# Patient Record
Sex: FEMALE | Race: WHITE | NOT HISPANIC OR LATINO | Employment: UNEMPLOYED | URBAN - METROPOLITAN AREA
[De-identification: names, ages, dates, MRNs, and addresses within clinical notes are randomized per-mention and may not be internally consistent; named-entity substitution may affect disease eponyms.]

---

## 2017-01-17 ENCOUNTER — GENERIC CONVERSION - ENCOUNTER (OUTPATIENT)
Dept: OTHER | Facility: OTHER | Age: 62
End: 2017-01-17

## 2018-01-16 NOTE — RESULT NOTES
Message   venous duplex wnl  Verified Results  VAS LOWER LIMB VENOUS DUPLEX STUDY, UNILATERAL/LIMITED [id number] 10:15AM Evelin Muhammad    Order Number: [id number]     Test Name Result Flag Reference   VAS LOWER LIMB VENOUS DUPLEX STUDY, UNILATERAL/LIMITED (Report)     [facility] REPORT   CLINICAL:   Indications: Right Limb Pain [M79 609]  Patient feels a hardness with tenderness to her posterior calf, and she states   sometimes that area is red after her shower  She has noticed this for 4 days  FINDINGS:      Segment Right      Left          Impression    Impression       CFV   Normal (Patent) Normal (Patent)             CONCLUSION:   Impression:   RIGHT LOWER LIMB: NORMAL   No evidence of acute or chronic deep vein thrombosis   No evidence of superficial thrombophlebitis noted  Doppler evaluation shows a normal response to augmentation maneuvers  Popliteal, posterior tibial and anterior tibial arterial Doppler waveforms are   triphasic  LEFT LOWER LIMB LIMITED: NORMAL   Evaluation shows no evidence of thrombus in the common femoral vein  Doppler evaluation shows a normal response to augmentation maneuvers        SIGNATURE:   Electronically Signed by: Josh Jean MD on 2016-02-03 11:35:33 AM

## 2018-05-11 DIAGNOSIS — I10 ESSENTIAL HYPERTENSION: Primary | ICD-10-CM

## 2018-05-11 RX ORDER — LISINOPRIL AND HYDROCHLOROTHIAZIDE 20; 12.5 MG/1; MG/1
TABLET ORAL
Qty: 90 TABLET | Refills: 1 | Status: SHIPPED | OUTPATIENT
Start: 2018-05-11 | End: 2018-11-07 | Stop reason: SDUPTHER

## 2018-05-11 NOTE — TELEPHONE ENCOUNTER
Notify patient just refilled BP med, not seen since 2016, needs office visit for general PE and med check and HTN check up

## 2018-07-20 ENCOUNTER — OFFICE VISIT (OUTPATIENT)
Dept: FAMILY MEDICINE CLINIC | Facility: CLINIC | Age: 63
End: 2018-07-20
Payer: COMMERCIAL

## 2018-07-20 VITALS
WEIGHT: 199.6 LBS | SYSTOLIC BLOOD PRESSURE: 132 MMHG | HEIGHT: 63 IN | BODY MASS INDEX: 35.37 KG/M2 | DIASTOLIC BLOOD PRESSURE: 74 MMHG

## 2018-07-20 DIAGNOSIS — R53.83 FATIGUE, UNSPECIFIED TYPE: ICD-10-CM

## 2018-07-20 DIAGNOSIS — Z11.59 ENCOUNTER FOR HEPATITIS C SCREENING TEST FOR LOW RISK PATIENT: ICD-10-CM

## 2018-07-20 DIAGNOSIS — M54.5 LOW BACK PAIN, UNSPECIFIED BACK PAIN LATERALITY, UNSPECIFIED CHRONICITY, WITH SCIATICA PRESENCE UNSPECIFIED: ICD-10-CM

## 2018-07-20 DIAGNOSIS — Z00.00 HEALTH CARE MAINTENANCE: Primary | ICD-10-CM

## 2018-07-20 DIAGNOSIS — Z12.39 SCREENING FOR BREAST CANCER: ICD-10-CM

## 2018-07-20 DIAGNOSIS — K21.9 GASTROESOPHAGEAL REFLUX DISEASE, ESOPHAGITIS PRESENCE NOT SPECIFIED: ICD-10-CM

## 2018-07-20 DIAGNOSIS — I10 ESSENTIAL HYPERTENSION: ICD-10-CM

## 2018-07-20 DIAGNOSIS — E66.9 OBESITY (BMI 30-39.9): ICD-10-CM

## 2018-07-20 PROCEDURE — 99396 PREV VISIT EST AGE 40-64: CPT | Performed by: FAMILY MEDICINE

## 2018-07-20 RX ORDER — NORTRIPTYLINE HYDROCHLORIDE 10 MG/1
10 CAPSULE ORAL
COMMUNITY
Start: 2014-05-07

## 2018-07-20 RX ORDER — CYCLOBENZAPRINE HCL 10 MG
TABLET ORAL
Refills: 2 | COMMUNITY
Start: 2018-06-19

## 2018-07-20 RX ORDER — MULTIVITAMIN WITH IRON
200 TABLET ORAL DAILY
COMMUNITY
End: 2020-01-27

## 2018-07-20 RX ORDER — PANTOPRAZOLE SODIUM 40 MG/1
40 TABLET, DELAYED RELEASE ORAL 2 TIMES DAILY
COMMUNITY
Start: 2012-03-19 | End: 2021-11-18 | Stop reason: SDUPTHER

## 2018-07-20 NOTE — PATIENT INSTRUCTIONS
Here for General PE and is fatigued and will need labs and mamogram as directed  HTN stable, take all meds as directed for low back pain and HTN and gerd  Get colon screenings as directed and f-up with GI and GYN as directed for routine screenings

## 2018-07-20 NOTE — PROGRESS NOTES
Assessment/Plan:  Chief Complaint   Patient presents with    Physical Exam    Hypertension     Patient Instructions   Here for General PE and is fatigued and will need labs and mamogram as directed  HTN stable, take all meds as directed for low back pain and HTN and gerd  Get colon screenings as directed and f-up with GI and GYN as directed for routine screenings  No problem-specific Assessment & Plan notes found for this encounter  Diagnoses and all orders for this visit:    Health care maintenance  -     CBC and differential; Future  -     TSH, 3rd generation; Future  -     T4, free  -     Lipid Panel with Direct LDL reflex; Future    Essential hypertension  -     Comprehensive metabolic panel; Future  -     TSH, 3rd generation; Future  -     T4, free    Gastroesophageal reflux disease, esophagitis presence not specified  -     CBC and differential; Future    Low back pain, unspecified back pain laterality, unspecified chronicity, with sciatica presence unspecified    Screening for breast cancer  -     Mammo screening bilateral w 3d & cad; Future    Fatigue, unspecified type  -     CBC and differential; Future  -     TSH, 3rd generation; Future  -     T4, free  -     Lipid Panel with Direct LDL reflex; Future  -     Hepatitis C antibody; Future    Encounter for hepatitis C screening test for low risk patient  -     Lipid Panel with Direct LDL reflex; Future  -     Hepatitis C antibody; Future    Obesity (BMI 30-39  9)    Other orders  -     Buprenorphine HCl (BELBUCA) 75 MCG FILM; Apply to cheek daily as needed          Subjective:      Patient ID: Cameron Gipson is a 61 y o  female  Here for general PE and feels tired all the time and takes all meds as directed and takes Pamelor for low back pain and also BP med as directed and gerd med as directed but this is not controlled even with BID dosing  Sees GI as directed  Sees [facility]  Has not seen GYN recently         Hypertension         The following portions of the patient's history were reviewed and updated as appropriate: allergies, current medications, past family history, past medical history, past social history, past surgical history and problem list     Review of Systems   Constitutional: Positive for fatigue  Obesity   HENT: Negative  Eyes: Negative  Respiratory: Negative  Cardiovascular: Negative  Gastrointestinal: Negative  Gerd stable   Endocrine: Negative  Genitourinary: Negative  Musculoskeletal: Negative  Chronic Low back pain   Skin: Negative  Allergic/Immunologic: Negative  Neurological: Negative  Hematological: Negative  Psychiatric/Behavioral: Negative  Objective:      /74   Ht 5' 3" (1 6 m)   Wt 90 5 kg (199 lb 9 6 oz)   BMI 35 36 kg/m²          Physical Exam   Constitutional: She is oriented to person, place, and time  She appears well-developed and well-nourished  obesity   HENT:   Head: Normocephalic and atraumatic  Right Ear: External ear normal    Left Ear: External ear normal    Nose: Nose normal    Mouth/Throat: Oropharynx is clear and moist    Eyes: Conjunctivae and EOM are normal  Pupils are equal, round, and reactive to light  Neck: Normal range of motion  Neck supple  Cardiovascular: Normal rate, regular rhythm, normal heart sounds and intact distal pulses  Pulmonary/Chest: Effort normal and breath sounds normal    Abdominal: Soft  Bowel sounds are normal    Musculoskeletal: Normal range of motion  Neurological: She is alert and oriented to person, place, and time  She has normal reflexes  Skin: Skin is warm and dry  Psychiatric: She has a normal mood and affect   Her behavior is normal

## 2018-07-27 ENCOUNTER — APPOINTMENT (OUTPATIENT)
Dept: LAB | Facility: CLINIC | Age: 63
End: 2018-07-27
Payer: COMMERCIAL

## 2018-07-27 DIAGNOSIS — K21.9 GASTROESOPHAGEAL REFLUX DISEASE, ESOPHAGITIS PRESENCE NOT SPECIFIED: ICD-10-CM

## 2018-07-27 DIAGNOSIS — Z00.00 HEALTH CARE MAINTENANCE: ICD-10-CM

## 2018-07-27 DIAGNOSIS — I10 ESSENTIAL HYPERTENSION: ICD-10-CM

## 2018-07-27 DIAGNOSIS — R53.83 FATIGUE, UNSPECIFIED TYPE: ICD-10-CM

## 2018-07-27 DIAGNOSIS — Z11.59 ENCOUNTER FOR HEPATITIS C SCREENING TEST FOR LOW RISK PATIENT: ICD-10-CM

## 2018-07-27 LAB
ALBUMIN SERPL BCP-MCNC: 3.7 G/DL (ref 3.5–5)
ALP SERPL-CCNC: 61 U/L (ref 46–116)
ALT SERPL W P-5'-P-CCNC: 42 U/L (ref 12–78)
ANION GAP SERPL CALCULATED.3IONS-SCNC: 6 MMOL/L (ref 4–13)
AST SERPL W P-5'-P-CCNC: 26 U/L (ref 5–45)
BASOPHILS # BLD AUTO: 0.07 THOUSANDS/ΜL (ref 0–0.1)
BASOPHILS NFR BLD AUTO: 1 % (ref 0–1)
BILIRUB SERPL-MCNC: 0.59 MG/DL (ref 0.2–1)
BUN SERPL-MCNC: 20 MG/DL (ref 5–25)
CALCIUM SERPL-MCNC: 8.9 MG/DL (ref 8.3–10.1)
CHLORIDE SERPL-SCNC: 106 MMOL/L (ref 100–108)
CHOLEST SERPL-MCNC: 225 MG/DL (ref 50–200)
CO2 SERPL-SCNC: 29 MMOL/L (ref 21–32)
CREAT SERPL-MCNC: 0.79 MG/DL (ref 0.6–1.3)
EOSINOPHIL # BLD AUTO: 0.19 THOUSAND/ΜL (ref 0–0.61)
EOSINOPHIL NFR BLD AUTO: 3 % (ref 0–6)
ERYTHROCYTE [DISTWIDTH] IN BLOOD BY AUTOMATED COUNT: 12.4 % (ref 11.6–15.1)
GFR SERPL CREATININE-BSD FRML MDRD: 80 ML/MIN/1.73SQ M
GLUCOSE P FAST SERPL-MCNC: 97 MG/DL (ref 65–99)
HCT VFR BLD AUTO: 41.8 % (ref 34.8–46.1)
HDLC SERPL-MCNC: 51 MG/DL (ref 40–60)
HGB BLD-MCNC: 13.2 G/DL (ref 11.5–15.4)
IMM GRANULOCYTES # BLD AUTO: 0.01 THOUSAND/UL (ref 0–0.2)
IMM GRANULOCYTES NFR BLD AUTO: 0 % (ref 0–2)
LDLC SERPL CALC-MCNC: 150 MG/DL (ref 0–100)
LYMPHOCYTES # BLD AUTO: 2.43 THOUSANDS/ΜL (ref 0.6–4.47)
LYMPHOCYTES NFR BLD AUTO: 45 % (ref 14–44)
MCH RBC QN AUTO: 29.1 PG (ref 26.8–34.3)
MCHC RBC AUTO-ENTMCNC: 31.6 G/DL (ref 31.4–37.4)
MCV RBC AUTO: 92 FL (ref 82–98)
MONOCYTES # BLD AUTO: 0.54 THOUSAND/ΜL (ref 0.17–1.22)
MONOCYTES NFR BLD AUTO: 10 % (ref 4–12)
NEUTROPHILS # BLD AUTO: 2.27 THOUSANDS/ΜL (ref 1.85–7.62)
NEUTS SEG NFR BLD AUTO: 41 % (ref 43–75)
NRBC BLD AUTO-RTO: 0 /100 WBCS
PLATELET # BLD AUTO: 371 THOUSANDS/UL (ref 149–390)
PMV BLD AUTO: 10.6 FL (ref 8.9–12.7)
POTASSIUM SERPL-SCNC: 3.9 MMOL/L (ref 3.5–5.3)
PROT SERPL-MCNC: 6.8 G/DL (ref 6.4–8.2)
RBC # BLD AUTO: 4.53 MILLION/UL (ref 3.81–5.12)
SODIUM SERPL-SCNC: 141 MMOL/L (ref 136–145)
T4 FREE SERPL-MCNC: 0.84 NG/DL (ref 0.76–1.46)
TRIGL SERPL-MCNC: 119 MG/DL
TSH SERPL DL<=0.05 MIU/L-ACNC: 1.05 UIU/ML (ref 0.36–3.74)
WBC # BLD AUTO: 5.51 THOUSAND/UL (ref 4.31–10.16)

## 2018-07-27 PROCEDURE — 85025 COMPLETE CBC W/AUTO DIFF WBC: CPT

## 2018-07-27 PROCEDURE — 80053 COMPREHEN METABOLIC PANEL: CPT

## 2018-07-27 PROCEDURE — 80061 LIPID PANEL: CPT

## 2018-07-27 PROCEDURE — 36415 COLL VENOUS BLD VENIPUNCTURE: CPT

## 2018-07-27 PROCEDURE — 84439 ASSAY OF FREE THYROXINE: CPT | Performed by: FAMILY MEDICINE

## 2018-07-27 PROCEDURE — 86803 HEPATITIS C AB TEST: CPT

## 2018-07-27 PROCEDURE — 84443 ASSAY THYROID STIM HORMONE: CPT

## 2018-07-28 LAB — HCV AB SER QL: NORMAL

## 2018-07-30 ENCOUNTER — OFFICE VISIT (OUTPATIENT)
Dept: FAMILY MEDICINE CLINIC | Facility: CLINIC | Age: 63
End: 2018-07-30
Payer: COMMERCIAL

## 2018-07-30 VITALS
DIASTOLIC BLOOD PRESSURE: 78 MMHG | SYSTOLIC BLOOD PRESSURE: 124 MMHG | HEIGHT: 63 IN | WEIGHT: 199 LBS | BODY MASS INDEX: 35.26 KG/M2

## 2018-07-30 DIAGNOSIS — I10 ESSENTIAL HYPERTENSION: Primary | ICD-10-CM

## 2018-07-30 DIAGNOSIS — E78.5 HYPERLIPIDEMIA, UNSPECIFIED HYPERLIPIDEMIA TYPE: ICD-10-CM

## 2018-07-30 DIAGNOSIS — E66.9 OBESITY (BMI 30-39.9): ICD-10-CM

## 2018-07-30 PROCEDURE — 3074F SYST BP LT 130 MM HG: CPT | Performed by: FAMILY MEDICINE

## 2018-07-30 PROCEDURE — 99213 OFFICE O/P EST LOW 20 MIN: CPT | Performed by: FAMILY MEDICINE

## 2018-07-30 PROCEDURE — 3078F DIAST BP <80 MM HG: CPT | Performed by: FAMILY MEDICINE

## 2018-07-30 PROCEDURE — 3008F BODY MASS INDEX DOCD: CPT | Performed by: FAMILY MEDICINE

## 2018-07-30 NOTE — PROGRESS NOTES
Assessment/Plan:  Chief Complaint   Patient presents with    Follow-up     reivew labs     Patient Instructions   Here to discuss labs and ldl was elevated, she wants low cholesterol diet trial and will recheck in 6 months for f-up and lose weight as directed  BP stable  No problem-specific Assessment & Plan notes found for this encounter  Diagnoses and all orders for this visit:    Essential hypertension    Hyperlipidemia, unspecified hyperlipidemia type    Obesity (BMI 30-39  9)          Subjective:      Patient ID: Nataliia Ziegler is a 61 y o  female  Here to discuss labs for high cholesterol  She prefers not to take high cholesterol  The following portions of the patient's history were reviewed and updated as appropriate: allergies, current medications, past family history, past medical history, past social history, past surgical history and problem list     Review of Systems   Constitutional: Negative  HENT: Negative  Eyes: Negative  Respiratory: Negative  Cardiovascular: Negative  Gastrointestinal: Negative  Endocrine: Negative  Genitourinary: Negative  Musculoskeletal: Negative  Skin: Negative  Allergic/Immunologic: Negative  Neurological: Negative  Hematological: Negative  Psychiatric/Behavioral: Negative  Objective:      /78   Ht 5' 3" (1 6 m)   Wt 90 3 kg (199 lb)   BMI 35 25 kg/m²          Physical Exam   Constitutional: She is oriented to person, place, and time  She appears well-developed and well-nourished  HENT:   Head: Normocephalic and atraumatic  Right Ear: External ear normal    Left Ear: External ear normal    Nose: Nose normal    Mouth/Throat: Oropharynx is clear and moist    Eyes: Conjunctivae and EOM are normal  Pupils are equal, round, and reactive to light  Neck: Normal range of motion  Neck supple  Cardiovascular: Normal rate, regular rhythm, normal heart sounds and intact distal pulses  Pulmonary/Chest: Effort normal and breath sounds normal    Musculoskeletal: Normal range of motion  Neurological: She is alert and oriented to person, place, and time  She has normal reflexes  Skin: Skin is warm and dry  Psychiatric: She has a normal mood and affect   Her behavior is normal

## 2018-07-30 NOTE — PATIENT INSTRUCTIONS
Here to discuss labs and ldl was elevated, she wants low cholesterol diet trial and will recheck in 6 months for f-up and lose weight as directed  BP stable

## 2018-09-04 DIAGNOSIS — J30.2 SEASONAL ALLERGIC RHINITIS, UNSPECIFIED TRIGGER: Primary | ICD-10-CM

## 2018-09-04 RX ORDER — FLUTICASONE PROPIONATE 50 MCG
SPRAY, SUSPENSION (ML) NASAL
Qty: 1 BOTTLE | Refills: 2 | Status: SHIPPED | OUTPATIENT
Start: 2018-09-04 | End: 2019-04-23 | Stop reason: ALTCHOICE

## 2018-11-07 DIAGNOSIS — I10 ESSENTIAL HYPERTENSION: ICD-10-CM

## 2018-11-07 RX ORDER — LISINOPRIL AND HYDROCHLOROTHIAZIDE 20; 12.5 MG/1; MG/1
TABLET ORAL
Qty: 90 TABLET | Refills: 1 | Status: SHIPPED | OUTPATIENT
Start: 2018-11-07 | End: 2019-05-06 | Stop reason: SDUPTHER

## 2019-01-30 DIAGNOSIS — Z12.39 SCREENING FOR MALIGNANT NEOPLASM OF BREAST: Primary | ICD-10-CM

## 2019-04-23 ENCOUNTER — ANNUAL EXAM (OUTPATIENT)
Dept: OBGYN CLINIC | Facility: MEDICAL CENTER | Age: 64
End: 2019-04-23
Payer: COMMERCIAL

## 2019-04-23 VITALS
HEIGHT: 63 IN | DIASTOLIC BLOOD PRESSURE: 60 MMHG | BODY MASS INDEX: 34.18 KG/M2 | WEIGHT: 192.9 LBS | SYSTOLIC BLOOD PRESSURE: 120 MMHG

## 2019-04-23 DIAGNOSIS — N81.4 CYSTOCELE WITH PROLAPSE: ICD-10-CM

## 2019-04-23 DIAGNOSIS — Z01.419 ENCOUNTER FOR ROUTINE GYNECOLOGICAL EXAMINATION WITH PAPANICOLAOU SMEAR OF CERVIX: Primary | ICD-10-CM

## 2019-04-23 DIAGNOSIS — B00.9 HSV INFECTION: ICD-10-CM

## 2019-04-23 DIAGNOSIS — N84.1 CERVICAL POLYP: ICD-10-CM

## 2019-04-23 PROCEDURE — G0145 SCR C/V CYTO,THINLAYER,RESCR: HCPCS | Performed by: OBSTETRICS & GYNECOLOGY

## 2019-04-23 PROCEDURE — S0610 ANNUAL GYNECOLOGICAL EXAMINA: HCPCS | Performed by: OBSTETRICS & GYNECOLOGY

## 2019-04-23 PROCEDURE — 88305 TISSUE EXAM BY PATHOLOGIST: CPT | Performed by: PATHOLOGY

## 2019-04-23 PROCEDURE — 87624 HPV HI-RISK TYP POOLED RSLT: CPT | Performed by: OBSTETRICS & GYNECOLOGY

## 2019-04-23 RX ORDER — VALACYCLOVIR HYDROCHLORIDE 500 MG/1
500 TABLET, FILM COATED ORAL 2 TIMES DAILY
Qty: 6 TABLET | Refills: 6 | Status: SHIPPED | OUTPATIENT
Start: 2019-04-23 | End: 2019-12-03 | Stop reason: SDUPTHER

## 2019-04-25 ENCOUNTER — HOSPITAL ENCOUNTER (OUTPATIENT)
Dept: MAMMOGRAPHY | Facility: MEDICAL CENTER | Age: 64
Discharge: HOME | End: 2019-04-25
Payer: COMMERCIAL

## 2019-04-25 VITALS — WEIGHT: 192 LBS | HEIGHT: 63 IN | BODY MASS INDEX: 34.02 KG/M2

## 2019-04-25 DIAGNOSIS — Z12.39 SCREENING FOR MALIGNANT NEOPLASM OF BREAST: ICD-10-CM

## 2019-04-25 LAB
HPV HR 12 DNA CVX QL NAA+PROBE: NEGATIVE
HPV16 DNA CVX QL NAA+PROBE: NEGATIVE
HPV18 DNA CVX QL NAA+PROBE: NEGATIVE

## 2019-04-25 PROCEDURE — 77067 SCR MAMMO BI INCL CAD: CPT

## 2019-04-25 PROCEDURE — 77063 BREAST TOMOSYNTHESIS BI: CPT

## 2019-04-26 LAB
LAB AP GYN PRIMARY INTERPRETATION: NORMAL
Lab: NORMAL

## 2019-05-06 DIAGNOSIS — I10 ESSENTIAL HYPERTENSION: ICD-10-CM

## 2019-05-06 RX ORDER — LISINOPRIL AND HYDROCHLOROTHIAZIDE 20; 12.5 MG/1; MG/1
TABLET ORAL
Qty: 90 TABLET | Refills: 1 | Status: SHIPPED | OUTPATIENT
Start: 2019-05-06 | End: 2019-11-02 | Stop reason: SDUPTHER

## 2019-11-02 DIAGNOSIS — I10 ESSENTIAL HYPERTENSION: ICD-10-CM

## 2019-11-04 RX ORDER — LISINOPRIL AND HYDROCHLOROTHIAZIDE 20; 12.5 MG/1; MG/1
TABLET ORAL
Qty: 90 TABLET | Refills: 4 | Status: SHIPPED | OUTPATIENT
Start: 2019-11-04 | End: 2021-01-25

## 2019-11-04 NOTE — TELEPHONE ENCOUNTER
Notify patient just refilled BP med and is due for office visit for this as she has not been seen since 2018 for HTN and General PE if not done in the last year

## 2019-11-22 ENCOUNTER — OFFICE VISIT (OUTPATIENT)
Dept: FAMILY MEDICINE CLINIC | Facility: CLINIC | Age: 64
End: 2019-11-22
Payer: COMMERCIAL

## 2019-11-22 VITALS
OXYGEN SATURATION: 98 % | DIASTOLIC BLOOD PRESSURE: 84 MMHG | WEIGHT: 198.2 LBS | RESPIRATION RATE: 16 BRPM | TEMPERATURE: 97.5 F | HEART RATE: 92 BPM | HEIGHT: 63 IN | BODY MASS INDEX: 35.12 KG/M2 | SYSTOLIC BLOOD PRESSURE: 122 MMHG

## 2019-11-22 DIAGNOSIS — Z23 NEED FOR IMMUNIZATION AGAINST INFLUENZA: Primary | ICD-10-CM

## 2019-11-22 DIAGNOSIS — E66.9 OBESITY (BMI 30-39.9): ICD-10-CM

## 2019-11-22 DIAGNOSIS — I10 ESSENTIAL HYPERTENSION: ICD-10-CM

## 2019-11-22 DIAGNOSIS — M54.50 LOW BACK PAIN, UNSPECIFIED BACK PAIN LATERALITY, UNSPECIFIED CHRONICITY, UNSPECIFIED WHETHER SCIATICA PRESENT: ICD-10-CM

## 2019-11-22 DIAGNOSIS — K21.9 GASTROESOPHAGEAL REFLUX DISEASE, ESOPHAGITIS PRESENCE NOT SPECIFIED: ICD-10-CM

## 2019-11-22 DIAGNOSIS — E78.5 HYPERLIPIDEMIA, UNSPECIFIED HYPERLIPIDEMIA TYPE: ICD-10-CM

## 2019-11-22 PROCEDURE — 90471 IMMUNIZATION ADMIN: CPT | Performed by: FAMILY MEDICINE

## 2019-11-22 PROCEDURE — 1036F TOBACCO NON-USER: CPT | Performed by: FAMILY MEDICINE

## 2019-11-22 PROCEDURE — 99214 OFFICE O/P EST MOD 30 MIN: CPT | Performed by: FAMILY MEDICINE

## 2019-11-22 PROCEDURE — 90682 RIV4 VACC RECOMBINANT DNA IM: CPT | Performed by: FAMILY MEDICINE

## 2019-11-22 NOTE — PATIENT INSTRUCTIONS
Here for BP check and also hx of hyperlipidemia and needs to lose weight to help with obesity  BP stable  F-up with pain management for chronic low back pain  Flu shot today

## 2019-11-22 NOTE — PROGRESS NOTES
Assessment/Plan:  Chief Complaint   Patient presents with    Follow-up     Pt presents for f/u  Patient Instructions   Here for BP check and also hx of hyperlipidemia and needs to lose weight to help with obesity  BP stable  F-up with pain management for chronic low back pain  Flu shot today  No problem-specific Assessment & Plan notes found for this encounter  Diagnoses and all orders for this visit:    Need for immunization against influenza  -     influenza vaccine, 5737-2061, quadrivalent, recombinant, PF, 0 5 mL, for patients 18 yr+ (FLUBLOK)    Essential hypertension  -     Comprehensive metabolic panel; Future    Hyperlipidemia, unspecified hyperlipidemia type  -     Comprehensive metabolic panel; Future  -     Lipid Panel with Direct LDL reflex; Future    Obesity (BMI 30-39  9)    Gastroesophageal reflux disease, esophagitis presence not specified    Low back pain, unspecified back pain laterality, unspecified chronicity, unspecified whether sciatica present          Subjective:      Patient ID: Leticia Vega is a 59 y o  female  Here for BP check and flu shot  No cp or sob, or ha  Does not take cholesterol medication  Takes BP med as directed  [facility] Lung med as directed  Ses GI as directed  Flu shot today  Pt  With low back pain  The following portions of the patient's history were reviewed and updated as appropriate: allergies, current medications, past family history, past medical history, past social history, past surgical history and problem list     Review of Systems   Constitutional:        Obesity   HENT: Negative  Eyes: Negative  Respiratory: Negative  Cardiovascular: Negative  Gastrointestinal: Negative  Gerd stable  Endocrine: Negative  Genitourinary: Negative  Musculoskeletal:        Low back pain   Skin: Negative  Allergic/Immunologic: Negative  Neurological: Negative  Hematological: Negative  Psychiatric/Behavioral: Negative  Objective:      /84 (BP Location: Left arm, Patient Position: Sitting, Cuff Size: Large)   Pulse 92   Temp 97 5 °F (36 4 °C) (Tympanic)   Resp 16   Ht 5' 3" (1 6 m)   Wt 89 9 kg (198 lb 3 2 oz)   SpO2 98%   BMI 35 11 kg/m²          Physical Exam   Constitutional: She is oriented to person, place, and time  She appears well-developed and well-nourished  obesity   HENT:   Head: Normocephalic and atraumatic  Right Ear: External ear normal    Left Ear: External ear normal    Nose: Nose normal    Mouth/Throat: Oropharynx is clear and moist    Eyes: Pupils are equal, round, and reactive to light  Conjunctivae and EOM are normal    Neck: Normal range of motion  Neck supple  Cardiovascular: Normal rate, regular rhythm, normal heart sounds and intact distal pulses  Pulmonary/Chest: Effort normal and breath sounds normal    Musculoskeletal: Normal range of motion  Low back pain with motion   Neurological: She is alert and oriented to person, place, and time  She has normal reflexes  Skin: Skin is warm and dry  Psychiatric: She has a normal mood and affect   Her behavior is normal

## 2019-12-03 DIAGNOSIS — B00.9 HSV INFECTION: ICD-10-CM

## 2019-12-03 RX ORDER — VALACYCLOVIR HYDROCHLORIDE 500 MG/1
500 TABLET, FILM COATED ORAL 2 TIMES DAILY
Qty: 6 TABLET | Refills: 6 | Status: SHIPPED | OUTPATIENT
Start: 2019-12-03 | End: 2020-03-26 | Stop reason: SDUPTHER

## 2019-12-13 ENCOUNTER — TRANSCRIBE ORDERS (OUTPATIENT)
Dept: LAB | Facility: CLINIC | Age: 64
End: 2019-12-13

## 2019-12-13 ENCOUNTER — APPOINTMENT (OUTPATIENT)
Dept: LAB | Facility: CLINIC | Age: 64
End: 2019-12-13
Payer: COMMERCIAL

## 2019-12-13 DIAGNOSIS — I10 ESSENTIAL HYPERTENSION: ICD-10-CM

## 2019-12-13 DIAGNOSIS — E78.5 HYPERLIPIDEMIA, UNSPECIFIED HYPERLIPIDEMIA TYPE: ICD-10-CM

## 2019-12-13 DIAGNOSIS — N32.81 DETRUSOR INSTABILITY OF BLADDER: Primary | ICD-10-CM

## 2019-12-13 LAB
ALBUMIN SERPL BCP-MCNC: 4 G/DL (ref 3.5–5)
ALP SERPL-CCNC: 64 U/L (ref 46–116)
ALT SERPL W P-5'-P-CCNC: 34 U/L (ref 12–78)
ANION GAP SERPL CALCULATED.3IONS-SCNC: 3 MMOL/L (ref 4–13)
AST SERPL W P-5'-P-CCNC: 18 U/L (ref 5–45)
BILIRUB SERPL-MCNC: 0.53 MG/DL (ref 0.2–1)
BUN SERPL-MCNC: 16 MG/DL (ref 5–25)
CALCIUM SERPL-MCNC: 9.6 MG/DL (ref 8.3–10.1)
CHLORIDE SERPL-SCNC: 107 MMOL/L (ref 100–108)
CHOLEST SERPL-MCNC: 234 MG/DL (ref 50–200)
CO2 SERPL-SCNC: 31 MMOL/L (ref 21–32)
CREAT SERPL-MCNC: 0.82 MG/DL (ref 0.6–1.3)
ERYTHROCYTE [DISTWIDTH] IN BLOOD BY AUTOMATED COUNT: 12.2 % (ref 11.6–15.1)
GFR SERPL CREATININE-BSD FRML MDRD: 76 ML/MIN/1.73SQ M
GLUCOSE P FAST SERPL-MCNC: 106 MG/DL (ref 65–99)
HCT VFR BLD AUTO: 43.5 % (ref 34.8–46.1)
HDLC SERPL-MCNC: 58 MG/DL
HGB BLD-MCNC: 13.9 G/DL (ref 11.5–15.4)
LDLC SERPL CALC-MCNC: 151 MG/DL (ref 0–100)
MCH RBC QN AUTO: 30.2 PG (ref 26.8–34.3)
MCHC RBC AUTO-ENTMCNC: 32 G/DL (ref 31.4–37.4)
MCV RBC AUTO: 94 FL (ref 82–98)
PLATELET # BLD AUTO: 362 THOUSANDS/UL (ref 149–390)
PMV BLD AUTO: 10.3 FL (ref 8.9–12.7)
POTASSIUM SERPL-SCNC: 4.8 MMOL/L (ref 3.5–5.3)
PROT SERPL-MCNC: 7 G/DL (ref 6.4–8.2)
RBC # BLD AUTO: 4.61 MILLION/UL (ref 3.81–5.12)
SODIUM SERPL-SCNC: 141 MMOL/L (ref 136–145)
TRIGL SERPL-MCNC: 126 MG/DL
WBC # BLD AUTO: 6.25 THOUSAND/UL (ref 4.31–10.16)

## 2019-12-13 PROCEDURE — 80053 COMPREHEN METABOLIC PANEL: CPT

## 2019-12-13 PROCEDURE — 36415 COLL VENOUS BLD VENIPUNCTURE: CPT

## 2019-12-13 PROCEDURE — 85027 COMPLETE CBC AUTOMATED: CPT

## 2019-12-13 PROCEDURE — 80061 LIPID PANEL: CPT

## 2020-01-09 ENCOUNTER — OFFICE VISIT (OUTPATIENT)
Dept: FAMILY MEDICINE CLINIC | Facility: CLINIC | Age: 65
End: 2020-01-09
Payer: COMMERCIAL

## 2020-01-09 VITALS
SYSTOLIC BLOOD PRESSURE: 136 MMHG | BODY MASS INDEX: 33.9 KG/M2 | WEIGHT: 198.6 LBS | DIASTOLIC BLOOD PRESSURE: 84 MMHG | TEMPERATURE: 97.9 F | OXYGEN SATURATION: 99 % | HEART RATE: 101 BPM | HEIGHT: 64 IN

## 2020-01-09 DIAGNOSIS — E78.5 HYPERLIPIDEMIA, UNSPECIFIED HYPERLIPIDEMIA TYPE: ICD-10-CM

## 2020-01-09 DIAGNOSIS — K21.9 GASTROESOPHAGEAL REFLUX DISEASE, ESOPHAGITIS PRESENCE NOT SPECIFIED: ICD-10-CM

## 2020-01-09 DIAGNOSIS — Z01.818 PRE-OP EXAMINATION: ICD-10-CM

## 2020-01-09 DIAGNOSIS — I10 ESSENTIAL HYPERTENSION: ICD-10-CM

## 2020-01-09 DIAGNOSIS — N81.4 UTEROVAGINAL PROLAPSE, UNSPECIFIED: Primary | ICD-10-CM

## 2020-01-09 LAB — ECG INTERP DURING EX: NORMAL MS

## 2020-01-09 PROCEDURE — 93000 ELECTROCARDIOGRAM COMPLETE: CPT | Performed by: FAMILY MEDICINE

## 2020-01-09 PROCEDURE — 99242 OFF/OP CONSLTJ NEW/EST SF 20: CPT | Performed by: FAMILY MEDICINE

## 2020-01-09 RX ORDER — EZETIMIBE 10 MG/1
10 TABLET ORAL DAILY
Qty: 30 TABLET | Refills: 5 | Status: SHIPPED | OUTPATIENT
Start: 2020-01-09 | End: 2020-05-21 | Stop reason: SDUPTHER

## 2020-01-09 NOTE — PATIENT INSTRUCTIONS
Here for preop and is medically cleared for surgery with Dr Randy Middleton for 1/28/20 for uterovaginal prolapse, has stable HTN and also has elevated LDL and also will try low cholesterol diet and will rec Zetia 10 mg daily  Recheck lipids and cmp in 6 months  Take BP med as directed  Take gerd med as directed  IT is stable on medication  Call if any problems with Zetia  Recheck in 3 months for htn and bp check and recheck labs for high cholesterol LDL in 6 months  Will give lab order at next office visit in may 2020 for HTN and cholesterol f-up

## 2020-01-09 NOTE — PROGRESS NOTES
Assessment/Plan:  Chief Complaint   Patient presents with    Pre-op Exam     Pre op clearance for surgery for prolapse by Dr Johanne Riojas on 1/28/20 at [facility] with IV sedation  Requires EKG  Patient Instructions   Here for preop and is medically cleared for surgery with Dr Johanne Riojas for 1/28/20 for uterovaginal prolapse, has stable HTN and also has elevated LDL and also will try low cholesterol diet and will rec Zetia 10 mg daily  Recheck lipids and cmp in 6 months  Take BP med as directed  Take gerd med as directed  IT is stable on medication  Call if any problems with Zetia  Recheck in 3 months for htn and bp check and recheck labs for high cholesterol LDL in 6 months  Will give lab order at next office visit in may 2020 for HTN and cholesterol f-up  No problem-specific Assessment & Plan notes found for this encounter  Diagnoses and all orders for this visit:    Uterovaginal prolapse, unspecified  -     POCT ECG    Pre-op examination  -     POCT ECG    Essential hypertension    Gastroesophageal reflux disease, esophagitis presence not specified    Hyperlipidemia, unspecified hyperlipidemia type  -     ezetimibe (ZETIA) 10 mg tablet; Take 1 tablet (10 mg total) by mouth daily          Subjective:      Patient ID: Yu Menezes is a 59 y o  female  Pre-op Exam (Pre op clearance for surgery for prolapse by Dr Johanne Riojas on 1/28/20 at [facility] with IV sedation  Requires EKG  ) No cp or sob, or ha  Pt  Is a nonsmoker  Labs done which show mild LDL elevation and did not tolerate red yest rice in past due to cramps  The following portions of the patient's history were reviewed and updated as appropriate: allergies, current medications, past family history, past medical history, past social history, past surgical history and problem list     Review of Systems   Constitutional: Negative  HENT: Negative  Eyes: Negative  Respiratory: Negative  Cardiovascular: Negative  Gastrointestinal: Negative  Endocrine: Negative  Genitourinary: Negative  Musculoskeletal: Negative  Skin: Negative  Allergic/Immunologic: Negative  Neurological: Negative  Hematological: Negative  Psychiatric/Behavioral: Negative  Objective:      /84   Pulse 101   Temp 97 9 °F (36 6 °C)   Ht 5' 4" (1 626 m)   Wt 90 1 kg (198 lb 9 6 oz)   SpO2 99%   BMI 34 09 kg/m²          Physical Exam   Constitutional: She is oriented to person, place, and time  She appears well-developed and well-nourished  HENT:   Head: Normocephalic and atraumatic  Right Ear: External ear normal    Left Ear: External ear normal    Nose: Nose normal    Mouth/Throat: Oropharynx is clear and moist    Eyes: Pupils are equal, round, and reactive to light  Conjunctivae and EOM are normal    Neck: Normal range of motion  Neck supple  Cardiovascular: Normal rate, regular rhythm, normal heart sounds and intact distal pulses  Pulmonary/Chest: Effort normal and breath sounds normal    Abdominal: Soft  Bowel sounds are normal    Musculoskeletal: Normal range of motion  Neurological: She is alert and oriented to person, place, and time  She has normal reflexes  Skin: Skin is warm and dry  Psychiatric: She has a normal mood and affect   Her behavior is normal

## 2020-01-27 ENCOUNTER — ANESTHESIA EVENT (OUTPATIENT)
Dept: PERIOP | Facility: HOSPITAL | Age: 65
End: 2020-01-27
Payer: COMMERCIAL

## 2020-01-27 RX ORDER — MULTIVITAMIN
1 CAPSULE ORAL DAILY
COMMUNITY

## 2020-01-27 RX ORDER — ACETAMINOPHEN 500 MG
1000 TABLET ORAL EVERY 6 HOURS PRN
COMMUNITY

## 2020-01-27 RX ORDER — IBUPROFEN 200 MG
200-800 TABLET ORAL EVERY 6 HOURS PRN
COMMUNITY

## 2020-01-27 NOTE — PRE-PROCEDURE INSTRUCTIONS
Pre-Surgery Instructions:   Medication Instructions    acetaminophen (TYLENOL) 500 mg tablet Instructed patient per Anesthesia Guidelines   Buprenorphine HCl (BELBUCA) 75 MCG FILM Instructed patient per Anesthesia Guidelines   Cholecalciferol 1000 units CHEW Instructed patient per Anesthesia Guidelines   cyclobenzaprine (FLEXERIL) 10 mg tablet Instructed patient per Anesthesia Guidelines   ibuprofen (MOTRIN) 200 mg tablet Patient was instructed by Physician and understands   lisinopril-hydrochlorothiazide (PRINZIDE,ZESTORETIC) 20-12 5 MG per tablet Instructed patient per Anesthesia Guidelines   MAGNESIUM GLUCONATE PO Instructed patient per Anesthesia Guidelines   Multiple Vitamin (MULTIVITAMIN) capsule Patient was instructed by Physician and understands   nortriptyline (PAMELOR) 10 mg capsule Instructed patient per Anesthesia Guidelines   pantoprazole (PROTONIX) 40 mg tablet Instructed patient per Anesthesia Guidelines   valACYclovir (VALTREX) 500 mg tablet Instructed patient per Anesthesia Guidelines  Instructed to take pantoprazole am of surgery with sip ofw ater per anesthesia

## 2020-01-28 ENCOUNTER — HOSPITAL ENCOUNTER (OUTPATIENT)
Facility: HOSPITAL | Age: 65
Setting detail: OUTPATIENT SURGERY
Discharge: HOME | End: 2020-01-28
Attending: OBSTETRICS & GYNECOLOGY | Admitting: OBSTETRICS & GYNECOLOGY
Payer: COMMERCIAL

## 2020-01-28 ENCOUNTER — ANESTHESIA (OUTPATIENT)
Dept: PERIOP | Facility: HOSPITAL | Age: 65
End: 2020-01-28
Payer: COMMERCIAL

## 2020-01-28 VITALS
HEIGHT: 64 IN | TEMPERATURE: 97.7 F | BODY MASS INDEX: 33.63 KG/M2 | DIASTOLIC BLOOD PRESSURE: 79 MMHG | OXYGEN SATURATION: 98 % | WEIGHT: 197 LBS | RESPIRATION RATE: 16 BRPM | SYSTOLIC BLOOD PRESSURE: 124 MMHG | HEART RATE: 88 BPM

## 2020-01-28 DIAGNOSIS — N81.4 UTEROVAGINAL PROLAPSE: Primary | ICD-10-CM

## 2020-01-28 PROCEDURE — 51798 US URINE CAPACITY MEASURE: CPT | Performed by: OBSTETRICS & GYNECOLOGY

## 2020-01-28 PROCEDURE — C1771 REP DEV, URINARY, W/SLING: HCPCS | Performed by: OBSTETRICS & GYNECOLOGY

## 2020-01-28 DEVICE — THE NEUGUIDE™ IS A SINGLE USE TRANS-VAGINAL DEVICE FOR THE REPAIR OF PELVIC ORGAN PROLAPSE (POP) BY ANCHORING SUTURES TO LIGAMENTS OF THE PELVIC FLOOR.
Type: IMPLANTABLE DEVICE | Site: VAGINA | Status: FUNCTIONAL
Brand: NEUGUIDE™

## 2020-01-28 DEVICE — SINGLE INCISION SLING SYSTEM
Type: IMPLANTABLE DEVICE | Site: URETHRA | Status: FUNCTIONAL
Brand: ALTIS

## 2020-01-28 RX ORDER — ACETAMINOPHEN 500 MG
500 TABLET ORAL EVERY 6 HOURS PRN
Qty: 30 TABLET | Refills: 0
Start: 2020-01-28

## 2020-01-28 RX ORDER — ONDANSETRON 2 MG/ML
4 INJECTION INTRAMUSCULAR; INTRAVENOUS EVERY 6 HOURS PRN
Status: DISCONTINUED | OUTPATIENT
Start: 2020-01-28 | End: 2020-01-28 | Stop reason: HOSPADM

## 2020-01-28 RX ORDER — SODIUM CHLORIDE 9 MG/ML
INJECTION, SOLUTION INTRAVENOUS AS NEEDED
Status: DISCONTINUED | OUTPATIENT
Start: 2020-01-28 | End: 2020-01-28 | Stop reason: HOSPADM

## 2020-01-28 RX ORDER — PROPOFOL 10 MG/ML
INJECTION, EMULSION INTRAVENOUS CONTINUOUS PRN
Status: DISCONTINUED | OUTPATIENT
Start: 2020-01-28 | End: 2020-01-28 | Stop reason: SURG

## 2020-01-28 RX ORDER — ACETAMINOPHEN 325 MG/1
650 TABLET ORAL EVERY 6 HOURS PRN
Status: DISCONTINUED | OUTPATIENT
Start: 2020-01-28 | End: 2020-01-28 | Stop reason: HOSPADM

## 2020-01-28 RX ORDER — MAGNESIUM HYDROXIDE 1200 MG/15ML
LIQUID ORAL AS NEEDED
Status: DISCONTINUED | OUTPATIENT
Start: 2020-01-28 | End: 2020-01-28 | Stop reason: HOSPADM

## 2020-01-28 RX ORDER — DOCUSATE SODIUM 100 MG/1
100 CAPSULE, LIQUID FILLED ORAL 2 TIMES DAILY
Qty: 10 CAPSULE | Refills: 0
Start: 2020-01-28

## 2020-01-28 RX ORDER — FENTANYL CITRATE/PF 50 MCG/ML
50 SYRINGE (ML) INJECTION
Status: COMPLETED | OUTPATIENT
Start: 2020-01-28 | End: 2020-01-28

## 2020-01-28 RX ORDER — KETAMINE HYDROCHLORIDE 50 MG/ML
INJECTION, SOLUTION, CONCENTRATE INTRAMUSCULAR; INTRAVENOUS AS NEEDED
Status: DISCONTINUED | OUTPATIENT
Start: 2020-01-28 | End: 2020-01-28 | Stop reason: SURG

## 2020-01-28 RX ORDER — ONDANSETRON 2 MG/ML
4 INJECTION INTRAMUSCULAR; INTRAVENOUS ONCE AS NEEDED
Status: DISCONTINUED | OUTPATIENT
Start: 2020-01-28 | End: 2020-01-28 | Stop reason: HOSPADM

## 2020-01-28 RX ORDER — ONDANSETRON 2 MG/ML
INJECTION INTRAMUSCULAR; INTRAVENOUS AS NEEDED
Status: DISCONTINUED | OUTPATIENT
Start: 2020-01-28 | End: 2020-01-28 | Stop reason: SURG

## 2020-01-28 RX ORDER — CEFAZOLIN SODIUM 2 G/50ML
2000 SOLUTION INTRAVENOUS ONCE
Status: COMPLETED | OUTPATIENT
Start: 2020-01-28 | End: 2020-01-28

## 2020-01-28 RX ORDER — DOCUSATE SODIUM 100 MG/1
100 CAPSULE, LIQUID FILLED ORAL 2 TIMES DAILY
Status: DISCONTINUED | OUTPATIENT
Start: 2020-01-28 | End: 2020-01-28 | Stop reason: HOSPADM

## 2020-01-28 RX ORDER — SODIUM CHLORIDE 9 MG/ML
125 INJECTION, SOLUTION INTRAVENOUS CONTINUOUS
Status: DISCONTINUED | OUTPATIENT
Start: 2020-01-28 | End: 2020-01-28 | Stop reason: HOSPADM

## 2020-01-28 RX ORDER — BUPIVACAINE HYDROCHLORIDE AND EPINEPHRINE 5; 5 MG/ML; UG/ML
INJECTION, SOLUTION PERINEURAL AS NEEDED
Status: DISCONTINUED | OUTPATIENT
Start: 2020-01-28 | End: 2020-01-28 | Stop reason: HOSPADM

## 2020-01-28 RX ORDER — FENTANYL CITRATE 50 UG/ML
INJECTION, SOLUTION INTRAMUSCULAR; INTRAVENOUS AS NEEDED
Status: DISCONTINUED | OUTPATIENT
Start: 2020-01-28 | End: 2020-01-28 | Stop reason: SURG

## 2020-01-28 RX ORDER — KETOROLAC TROMETHAMINE 30 MG/ML
INJECTION, SOLUTION INTRAMUSCULAR; INTRAVENOUS AS NEEDED
Status: DISCONTINUED | OUTPATIENT
Start: 2020-01-28 | End: 2020-01-28 | Stop reason: SURG

## 2020-01-28 RX ORDER — MIDAZOLAM HYDROCHLORIDE 2 MG/2ML
INJECTION, SOLUTION INTRAMUSCULAR; INTRAVENOUS AS NEEDED
Status: DISCONTINUED | OUTPATIENT
Start: 2020-01-28 | End: 2020-01-28 | Stop reason: SURG

## 2020-01-28 RX ADMIN — FENTANYL CITRATE 50 MCG: 50 INJECTION INTRAMUSCULAR; INTRAVENOUS at 16:37

## 2020-01-28 RX ADMIN — KETAMINE HYDROCHLORIDE 25 MG: 50 INJECTION INTRAMUSCULAR; INTRAVENOUS at 15:39

## 2020-01-28 RX ADMIN — SODIUM CHLORIDE: 0.9 INJECTION, SOLUTION INTRAVENOUS at 15:25

## 2020-01-28 RX ADMIN — ACETAMINOPHEN 650 MG: 325 TABLET ORAL at 17:30

## 2020-01-28 RX ADMIN — FENTANYL CITRATE 50 MCG: 50 INJECTION, SOLUTION INTRAMUSCULAR; INTRAVENOUS at 15:05

## 2020-01-28 RX ADMIN — KETOROLAC TROMETHAMINE 30 MG: 30 INJECTION, SOLUTION INTRAMUSCULAR at 16:06

## 2020-01-28 RX ADMIN — KETAMINE HYDROCHLORIDE 25 MG: 50 INJECTION INTRAMUSCULAR; INTRAVENOUS at 15:05

## 2020-01-28 RX ADMIN — ONDANSETRON 4 MG: 2 INJECTION INTRAMUSCULAR; INTRAVENOUS at 15:39

## 2020-01-28 RX ADMIN — PROPOFOL 130 MCG/KG/MIN: 10 INJECTION, EMULSION INTRAVENOUS at 14:50

## 2020-01-28 RX ADMIN — FENTANYL CITRATE 50 MCG: 50 INJECTION, SOLUTION INTRAMUSCULAR; INTRAVENOUS at 14:48

## 2020-01-28 RX ADMIN — CEFAZOLIN SODIUM 2000 MG: 2 SOLUTION INTRAVENOUS at 14:47

## 2020-01-28 RX ADMIN — SODIUM CHLORIDE 125 ML/HR: 0.9 INJECTION, SOLUTION INTRAVENOUS at 12:59

## 2020-01-28 RX ADMIN — FENTANYL CITRATE 50 MCG: 50 INJECTION INTRAMUSCULAR; INTRAVENOUS at 16:32

## 2020-01-28 RX ADMIN — DOCUSATE SODIUM 100 MG: 100 CAPSULE, LIQUID FILLED ORAL at 18:24

## 2020-01-28 RX ADMIN — MIDAZOLAM 2 MG: 1 INJECTION INTRAMUSCULAR; INTRAVENOUS at 14:48

## 2020-01-28 NOTE — OP NOTE
OPERATIVE REPORT  PATIENT NAME: Diana Vivar    :  1955  MRN: [id number]  Pt Location: [facility] OR ROOM 04    SURGERY DATE: 2020    Surgeon(s) and Role: * Hunter Carolina MD - Primary     * Jose D Tellez MD - Fellow, Assisting    Preop Diagnosis:  Uterovaginal prolapse, unspecified [N81 4]  Cystocele, midline [N81 11]  Rectocele [N81 6]  Incompetence or weakening of pubocervical tissue [N81 82]  Incompetence or weakening of rectovaginal tissue [N81 83]  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 41]    Post-Op Diagnosis Codes:     * Uterovaginal prolapse, unspecified [N81 4]     * Cystocele, midline [N81 11]     * Rectocele [N81 6]     * Incompetence or weakening of pubocervical tissue [N81 82]     * Incompetence or weakening of rectovaginal tissue [N81 83]     * 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 41]    Procedure(s) (LRB):  VE COLPOSUSPENSION (ENPLACE)   POSTERIOR COLPORRAPHY(N/A)  SINGLE INCISION SLING (N/A)  CYSTOSCOPY (N/A)    Specimen(s):  * No specimens in log *    Estimated Blood Loss:   Minimal    Drains:  * No LDAs found *    Anesthesia Type:   IV Sedation with Anesthesia    Operative Indications:  Uterovaginal prolapse, unspecified [N81 4]  Cystocele, midline [N81 11]  Rectocele [N81 6]  Incompetence or weakening of pubocervical tissue [N81 82]  Incompetence or weakening of rectovaginal tissue [N81 83]  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 41]    Operative Findings:  Stage 3 uterovaginal prolapse  Relaxed outlet  Cystoscopy: efflux noted from bilateral ureteral orifices  No mesh, suture material, or injury noted to bladder lumen  Complications:   None    Procedure and Technique:    Appropriate preoperative antibiotics chosen per ACOG guidelines were given  Bilateral SCDs were placed in the lower extremities for DVT prevention prior to the institution of anesthesia  No bladder, ureteral, viscus, or solid organ injury were noted at the end of the procedure      The patient was identified in the holding area by the operating room staff and attending physician  She was taken to the operating room where anesthesia was instituted without complications  She was placed in the dorsal lithotomy position with the legs in [address] [place] [address] with care taken to avoid excessive flexion or extension of her lower extremities  The patient was prepped and draped in the usual sterile fashion  A Rivas catheter was inserted  Vaginal exam noted the above findings  A finger was placed in the lateral vagina, with careful palpation of the ischial spines and sacrospinous ligaments bilaterally  The right finger sleeve was then applied to the surgeon's index finger  These landmarks were then again palpated with the finger sleeve in place  Location along the sacrospinous ligament approximately in the middle 1/3 of the ligament as well as the lower 1/3 of the ligament was carefully palpated, and the trocar device loaded with the 0 Prolene suture was brought into the field  He was inserted into the finger sleeve port and the device was used to place the 0 Prolene suture at this location while the surgeons finger was firmly pressed against the ligament, approximately 2 cm medial to the ischial spine  Once the suture is anchored in place, which was confirmed by tenting of the ligament with gentle tugging, all instruments were removed from the vagina  She has the exact same procedure was performed on the contralateral side  Next, a 3 cm anterior vaginal epithelial incision was made along the anterior cervico-vaginal junction, until the cervical stroma was encountered  Then the Prolene suture was passed backwards using a needle through its entering point in the vaginal wall and medially through the cervical stromal tissue  The exact same procedure was performed on the contralateral side  These sutures were then tied down separately, one at a time until proper apical support was obtained      The vaginal epithelium was closed using 2-0 Vicryl suture in a running locked fashion with care taken to incorporate a full-thickness closure  The incision was hemostatic  Next, we turned our attention to the single incision sling  The mid urethral zone was identified by reference to the Rivas catheter and urethral meatus  Local anesthetic solution was injected into the anterior vaginal wall muscularis at the mid urethral level for hydrodissection and vasoconstriction, 10 mL was injected at the midline  Next, an additional 10 mL was injected to the left and right of midline directing the infiltration laterally towards the cephalad aspect of the inferior pubic ramus bilaterally  Care was taken to ensure that the anterolateral sulcus was flattened by the infiltration to minimize chance for buttonholing or sling (tape) becoming too close to the anterolateral sulcus  After completion of hydrodissection, 2 Allis clamps were used to grasp the anterior vaginal wall for traction  A 1 5 cm incision was made into the midline through mucosa and muscularis  Two Allis clamps were then placed on each cut edge of the incision for stabilization  Tenotomy scissors were used to create a small vaginal tunnel with sharp and blunt dissection above the anterior vaginal wall directed laterally towards the cephalad aspect of the inferior pubic ramus bilaterally  Dissection was carried out to the edge of the bone itself but without dissection into the obturator internus muscle  Once the dissection was complete, the Altis sling system was placed  An index finger was placed in the vagina for guidance  The Altis trocar and fixed anchor was placed into the pre-dissected tract  The handle was held horizontally in a slight upward angle to avoid buttonholing of the sulcus  Cephalad drift was used to allow passage around the inferior pubic ramus   A thumb was placed on the heel of the introducer to allow a lateral followed by a rotation push maneuver to place a fixed anchor into the obturator internus muscle membrane complex on the patient's left side  Proper handle deviation confirmed proper anchor placement, as well as inspection of the sling itself  Once the introducer was removed, proper anchor placement was confirmed by gentle tugging on the sling  The exact same sequence of steps was repeated on the patient's right side with the adjustable anchor and trocar  Once placement of dynamic anchor was completed, the introducer was removed and gentle tugging again confirmed proper anchor placement  The Rivas catheter was then removed and a diagnostic cystoscopy was performed by instilling 300 mL of fluid into the bladder  Both ureters were effluxing normally and there were no lacerations or evidence of injury to the lower urinary tract  The tensioning suture was used to adjust the tape until there was minimal to no leakage with Crede and coughing  After appropriate tensioning, the tensioning suture was cut  The vaginal incision was closed with 2-0 Vicryl suture in a running locked stitch  The Rivas catheter was then used to drain the bladder and then it was removed and a diagnostic cystoscopy was performed by instilling 300 mL of fluid into the bladder  Both ureters were effluxing normally and there were no lacerations in the lower urinary tract  Attention was then turned to the posterior compartment where two Allis clamps were placed in the posterior fourchette over the mucocutaneous border to reduce the markedly relaxed vaginal outlet  Dilute 0 25% Marcaine solution with epinephrine was injected into the perineum  A moraima-shaped incision was made extending from the vaginal mucosa onto the perineum  The overlying scarred vaginal epithelium and perineal skin was removed en bloc with the Bovie device with excellent hemostasis noted throughout  The posterior colporrhaphy was closed with a 2-0 Vicryl suture in a running locked stitch   We reapproximated the perineal body with a 0-Vicryl interrupted suture  The remainder of the colporrhaphy was closed to the level of the hymen  The perineal skin was closed with with 2-0 Vicryl in a subcuticular fashion  The sponge, needle and instrument count were correct x 2  The patient tolerated the procedure well  She was awakened from anesthesia and transferred to the recovery room in stable condition  A qualified resident physician was not available to assist  Dr Afshin Rodriguez was present for the entire procedure        Patient Disposition:  PACU     SIGNATURE: Michaela Nunez MD  DATE: January 28, 2020  TIME: 4:01 PM

## 2020-01-28 NOTE — ANESTHESIA PREPROCEDURE EVALUATION
Review of Systems/Medical History  Patient summary reviewed  Chart reviewed  No history of anesthetic complications     Cardiovascular  Hyperlipidemia, Hypertension ,    Pulmonary  No asthma ,   Comment: CHILDHOOD ASTHMA     GI/Hepatic    GERD well controlled,          Comment: See [address]      Endo/Other    Comment: Hx MRSA  Hx melanoma Obesity (34=BMI)    GYN       Hematology   Musculoskeletal  Back pain (back surgeries, present back pain) , lumbar pain,   Arthritis     Neurology    Headaches,    Psychology     Chronic pain,            Physical Exam    Airway    Mallampati score: II  TM Distance: >3 FB  Neck ROM: full     Dental   Comment: partial, No notable dental hx upper dentures,     Cardiovascular  Rhythm: regular, Rate: normal, Cardiovascular exam normal    Pulmonary  Pulmonary exam normal Breath sounds clear to auscultation,     Other Findings        Anesthesia Plan  ASA Score- 2     Anesthesia Type- IV sedation with anesthesia with ASA Monitors  Additional Monitors:   Airway Plan:     Comment: GA/LMA prn  Plan Factors-Patient not instructed to abstain from smoking on day of procedure  Patient did not smoke on day of surgery  Induction- intravenous  Postoperative Plan-     Informed Consent- Anesthetic plan and risks discussed with patient and spouse Manju Dao

## 2020-01-28 NOTE — DISCHARGE INSTRUCTIONS
Colpopexy  WHAT YOU NEED TO KNOW:   An anterior vaginal repair is a procedure to lift the cervix and uterus  This can help with urination and bowel habits as well as fix the bulge  The procedure is also called an extraperionteal colposuspension  DISCHARGE INSTRUCTIONS:   Medicines:   · Pain medicine: You may need medicine to take away or decrease pain  ¨ Learn how to take your medicine  Ask what medicine and how much you should take  Be sure you know how, when, and how often to take it  ¨ Do not wait until the pain is severe before you take your medicine  Tell caregivers if your pain does not decrease  ¨ Pain medicine can make you dizzy or sleepy  Prevent falls by calling someone when you get out of bed or if you need help  · Antibiotics: This medicine is given to fight or prevent an infection caused by bacteria  Always take your antibiotics exactly as ordered by your healthcare provider  Do not stop taking your medicine unless directed by your healthcare provider  Never save antibiotics or take leftover antibiotics that were given to you for another illness  · Take your medicine as directed  Contact your healthcare provider if you think your medicine is not helping or if you have side effects  Tell him or her if you are allergic to any medicine  Keep a list of the medicines, vitamins, and herbs you take  Include the amounts, and when and why you take them  Bring the list or the pill bottles to follow-up visits  Carry your medicine list with you in case of an emergency  Follow up with your healthcare provider as directed:  Write down your questions so you remember to ask them during your visits  Self-care:   · Sex:  Do not have sex until your healthcare provider says it is okay  · Kegel exercises: To do kegel exercises, squeeze your pelvic floor muscles for 5 to 10 seconds, then release  Regular kegel exercises will help your pelvic floor muscles become stronger   This will help prevent you from leaking urine  Ask your healthcare provider when to start these exercises and how often to do them  · Sanitary pad:  Change your sanitary pad regularly  Keep track of how often you change the pad  · Rivas catheter:  Keep the bag below your waist  This will help prevent infection and other problems caused by urine flowing back into your bladder  Do not pull on the catheter because this can cause pain and bleeding, and the catheter could come out  Keep the catheter tubing free of kinks so your urine will flow into the bag  Your healthcare provider will remove the catheter as soon as possible, to help prevent infection  · Wound care:  When you are allowed to bathe or shower, carefully wash your vaginal area with soap and water  · Do not put pressure on your abdomen: This will help prevent damage to your surgery area  Do not strain, lift heavy objects, or stand for a very long time  Do not perform strenuous exercises, such as running and weight lifting  · Activity:  You may need to start walking within a few days after your procedure  Ask your healthcare provider when to start and how long you should walk  Ask about any other exercises that may be right for you  · Support socks: You may need to wear support socks  These are tight socks that help increase the circulation in your legs until you are more active  This helps prevent blood clots  Contact your healthcare provider if:   · You soak a sanitary pad with blood every hour for 4 hours  · You have vaginal pain that does not go away even after you take pain medicine  · You have pus or a foul-smelling discharge from your genital area  · You see blood in your urine  · You have pain during sex  · You have a fever, chills, a cough, or feel weak and achy  · You have nausea and vomiting  · You have questions or concerns about your condition or care    Seek care immediately or call 911 if:   · You feel something is bulging out into your vagina or rectum and not going back in     · You cannot urinate  · Your arm or leg feels warm, tender, and painful  It may look swollen and red  · You suddenly feel lightheaded and short of breath  · You have chest pain  You may have more pain when you take a deep breath or cough  You may cough up blood  © 2017 [employer] Information is for End User's use only and may not be sold, redistributed or otherwise used for commercial purposes  All illustrations and images included in CareNotes® are the copyrighted property of [employer]  or Jose Wilks  The above information is an  only  It is not intended as medical advice for individual conditions or treatments  Talk to your doctor, nurse or pharmacist before following any medical regimen to see if it is safe and effective for you  Bladder Sling Procedure   WHAT YOU NEED TO KNOW:   A bladder sling procedure is surgery to treat urinary incontinence in women  The sling acts as a hammock to keep your urethra in place and hold it closed when your bladder is full  You may have vaginal bleeding or discharge for up to a week after your surgery  Use sanitary pads  Do not use tampons  You may have some pelvic discomfort or trouble urinating  DISCHARGE INSTRUCTIONS:   Call 911 for any of the following:   · You have sudden trouble breathing  Seek care immediately if:   · Your bleeding gets worse  · You have yellow or foul smelling discharge from your vagina  · You cannot urinate, or you are urinating less than what is normal for you  · You feel confused  Contact your healthcare provider if:   · You have a fever  · You do not feel like you are able to empty your bladder completely when you urinate  · You feel the need to urinate very suddenly  · You have burning or stinging when you urinate  · You have blood in your urine      · Your skin is itchy, swollen, or you have a rash     · You have questions or concerns about your condition or care  Medicines:   · Prescription pain medicine  may be given  Ask your how to take this medicine safely  · Take your medicine as directed  Contact your healthcare provider if you think your medicine is not helping or if you have side effects  Tell him or her if you are allergic to any medicine  Keep a list of the medicines, vitamins, and herbs you take  Include the amounts, and when and why you take them  Bring the list or the pill bottles to follow-up visits  Carry your medicine list with you in case of an emergency  Self-catheterization:  You may need to put a catheter into your bladder after you urinate to empty any remaining urine  A catheter is a small rubber tube used to drain urine  Healthcare providers will teach you how to put the catheter in safely  This may be needed until you are completely emptying your bladder  Rivas catheter: You may have a Rivas catheter for a short period of time  The Rivas is a tube put into your bladder to drain urine into a bag  Keep the bag below your waist  This will prevent urine from flowing back into your bladder and causing an infection or other problems  Also, keep the tube free of kinks so the urine will drain properly  Do not pull on the catheter  This can cause pain and bleeding, and may cause the catheter to come out  Activity:  Do not lift heavy objects for 6 weeks after your procedure  Do not have intercourse for 4 to 6 weeks  Do not use a tampon for 4 weeks  Ask your healthcare provider when you can return to work or your usual activities  Do pelvic muscle exercises: These are also called Kegel exercises  These exercises help strengthen your pelvic muscles and help prevent urine leakage  Tighten the muscles of your pelvis and hold them tight for 5 seconds, then relax for 5 seconds  Gradually work up to tightening them for 10 seconds and relaxing for 10 seconds   Do this 3 times each day   Keep a record:  Keep a record of when you urinate and if you leak any urine  Write down what you were doing when you leaked urine, such as coughing or sneezing  Bring the log to your follow-up visits  Prevent constipation:  Drink liquids as directed  You may need to drink more water than usual to soften your bowel movements  Eat a variety of healthy foods, especially fruit and foods high in fiber  You may need to use an over-the-counter bowel movement softener  Follow up with your healthcare provider as directed: You may need a test to check how much urine remains in your bladder after you urinate  This will help show how the sling is working  Write down your questions so you remember to ask them during your visits  © 2017 [facility] [facility] Information is for End User's use only and may not be sold, redistributed or otherwise used for commercial purposes  All illustrations and images included in CareNotes® are the copyrighted property of [employer] M [employer] Inc  or Jose Lashaun  The above information is an  only  It is not intended as medical advice for individual conditions or treatments  Talk to your doctor, nurse or pharmacist before following any medical regimen to see if it is safe and effective for you  Posterior Vaginal Repair   WHAT YOU NEED TO KNOW:   A posterior vaginal repair is surgery to fix a rectocele or vaginal hernia  DISCHARGE INSTRUCTIONS:   Medicines:   · Pain medicine  will help take away or decrease pain  Do not wait until the pain is severe before you take your medicine  · NSAIDs , such as ibuprofen, help decrease swelling, pain, and fever  This medicine is available with or without a doctor's order  NSAIDs can cause stomach bleeding or kidney problems in certain people  If you take blood thinner medicine, always ask your healthcare provider if NSAIDs are safe for you  Always read the medicine label and follow directions      · Bowel movement softeners  make it easier for you to have a bowel movement  You may need this medicine to treat or prevent constipation  · Take your medicine as directed  Contact your healthcare provider if you think your medicine is not helping or if you have side effects  Tell him or her if you are allergic to any medicine  Keep a list of the medicines, vitamins, and herbs you take  Include the amounts, and when and why you take them  Bring the list or the pill bottles to follow-up visits  Carry your medicine list with you in case of an emergency  Follow up with your gynecologist in 2 weeks: You will need to return to have your incision checked  Write down your questions so you remember to ask them during your visits  Self-care:   · Do not have sex  until your healthcare provider says it is okay  · Do not put anything in your vagina  for 6 weeks after the surgery  This allows time for the wound to heal      · Do not lift more than 10 pounds  for at least 6 weeks  Heavy lifting puts pressure on the surgery area and slows healing  · Avoid heavy exercise  the first few weeks after the surgery  You may try light activity, such as short walks, 3 to 4 weeks after the surgery  · Try not to cough or strain to have a bowel movement  This may cause damage to the surgery area  Ask your healthcare provider about ways to make bowel movements easier so you do not have to strain  · Eat healthy foods and drink liquids as directed  This will help prevent constipation  Healthy foods include fruits, vegetables, whole-grain breads, low-fat dairy products, beans, lean meats, and fish  Contact your healthcare provider or gynecologist if:   · You have vaginal pain that does not go away, even after you take pain medicine  · You have pus or a foul-smelling discharge from your vagina  · You have pain during sex  · You have a fever or chills  · Your wound is red, swollen, or draining pus      · You have questions or concerns about your condition or care  Seek care immediately or call 911 if:   · You soak a sanitary pad with blood every hour for 4 hours  · You feel something is bulging out into your vagina or rectum and not going back in     · You cannot urinate  © 2017 [employer] Information is for End User's use only and may not be sold, redistributed or otherwise used for commercial purposes  All illustrations and images included in CareNotes® are the copyrighted property of [employer]  or Jose Wilks  The above information is an  only  It is not intended as medical advice for individual conditions or treatments  Talk to your doctor, nurse or pharmacist before following any medical regimen to see if it is safe and effective for you

## 2020-01-28 NOTE — INTERVAL H&P NOTE
H&P reviewed  After examining the patient I find no changes in the patients condition since the H&P had been written      Vitals:    01/28/20 1242   BP: 133/66   Pulse: 96   Resp: 16   Temp: 98 6 °F (37 °C)   SpO2: 99%

## 2020-01-28 NOTE — ANESTHESIA POSTPROCEDURE EVALUATION
Post-Op Assessment Note    CV Status:  Stable  Pain Score: 3    Pain management: adequate     Mental Status:  Alert and awake   Hydration Status:  Euvolemic and stable   PONV Controlled:  Controlled   Airway Patency:  Patent   Post Op Vitals Reviewed: Yes      Staff: Anesthesiologist           /59 (01/28/20 1632)    Temp     Pulse 100 (01/28/20 1632)   Resp 13 (01/28/20 1632)    SpO2 97 % (01/28/20 1632)

## 2020-03-26 ENCOUNTER — TELEPHONE (OUTPATIENT)
Dept: OBGYN CLINIC | Facility: MEDICAL CENTER | Age: 65
End: 2020-03-26

## 2020-03-26 DIAGNOSIS — B00.9 HSV INFECTION: ICD-10-CM

## 2020-03-26 NOTE — TELEPHONE ENCOUNTER
The patient would like a refill of valacyclovir sent to [facility]  [phone]

## 2020-03-27 RX ORDER — VALACYCLOVIR HYDROCHLORIDE 500 MG/1
500 TABLET, FILM COATED ORAL 2 TIMES DAILY
Qty: 6 TABLET | Refills: 1 | Status: SHIPPED | OUTPATIENT
Start: 2020-03-27 | End: 2020-05-26 | Stop reason: SDUPTHER

## 2020-05-21 ENCOUNTER — TELEMEDICINE (OUTPATIENT)
Dept: FAMILY MEDICINE CLINIC | Facility: CLINIC | Age: 65
End: 2020-05-21
Payer: MEDICARE

## 2020-05-21 DIAGNOSIS — K21.9 GASTROESOPHAGEAL REFLUX DISEASE, ESOPHAGITIS PRESENCE NOT SPECIFIED: ICD-10-CM

## 2020-05-21 DIAGNOSIS — E78.5 HYPERLIPIDEMIA, UNSPECIFIED HYPERLIPIDEMIA TYPE: ICD-10-CM

## 2020-05-21 DIAGNOSIS — E66.9 OBESITY (BMI 30-39.9): ICD-10-CM

## 2020-05-21 DIAGNOSIS — I10 ESSENTIAL HYPERTENSION: Primary | ICD-10-CM

## 2020-05-21 PROCEDURE — 99214 OFFICE O/P EST MOD 30 MIN: CPT | Performed by: FAMILY MEDICINE

## 2020-05-21 RX ORDER — EZETIMIBE 10 MG/1
10 TABLET ORAL DAILY
Qty: 30 TABLET | Refills: 5 | Status: SHIPPED | OUTPATIENT
Start: 2020-05-21 | End: 2020-12-21

## 2020-05-26 DIAGNOSIS — B00.9 HSV INFECTION: ICD-10-CM

## 2020-05-26 RX ORDER — VALACYCLOVIR HYDROCHLORIDE 500 MG/1
500 TABLET, FILM COATED ORAL 2 TIMES DAILY
Qty: 6 TABLET | Refills: 1 | Status: SHIPPED | OUTPATIENT
Start: 2020-05-26 | End: 2020-11-03 | Stop reason: SDUPTHER

## 2020-08-21 ENCOUNTER — OFFICE VISIT (OUTPATIENT)
Dept: FAMILY MEDICINE CLINIC | Facility: CLINIC | Age: 65
End: 2020-08-21
Payer: MEDICARE

## 2020-08-21 VITALS
HEIGHT: 64 IN | OXYGEN SATURATION: 97 % | RESPIRATION RATE: 17 BRPM | DIASTOLIC BLOOD PRESSURE: 76 MMHG | HEART RATE: 90 BPM | SYSTOLIC BLOOD PRESSURE: 122 MMHG | WEIGHT: 187 LBS | BODY MASS INDEX: 31.92 KG/M2 | TEMPERATURE: 96.9 F

## 2020-08-21 DIAGNOSIS — E66.9 OBESITY (BMI 30-39.9): ICD-10-CM

## 2020-08-21 DIAGNOSIS — Z12.31 ENCOUNTER FOR SCREENING MAMMOGRAM FOR MALIGNANT NEOPLASM OF BREAST: Primary | ICD-10-CM

## 2020-08-21 DIAGNOSIS — E78.5 HYPERLIPIDEMIA, UNSPECIFIED HYPERLIPIDEMIA TYPE: ICD-10-CM

## 2020-08-21 DIAGNOSIS — K21.9 GASTROESOPHAGEAL REFLUX DISEASE, ESOPHAGITIS PRESENCE NOT SPECIFIED: ICD-10-CM

## 2020-08-21 DIAGNOSIS — I10 ESSENTIAL HYPERTENSION: ICD-10-CM

## 2020-08-21 PROCEDURE — 99214 OFFICE O/P EST MOD 30 MIN: CPT | Performed by: FAMILY MEDICINE

## 2020-08-21 PROCEDURE — 3008F BODY MASS INDEX DOCD: CPT | Performed by: FAMILY MEDICINE

## 2020-08-21 PROCEDURE — 1036F TOBACCO NON-USER: CPT | Performed by: FAMILY MEDICINE

## 2020-08-21 PROCEDURE — 3074F SYST BP LT 130 MM HG: CPT | Performed by: FAMILY MEDICINE

## 2020-08-21 PROCEDURE — 3078F DIAST BP <80 MM HG: CPT | Performed by: FAMILY MEDICINE

## 2020-08-21 NOTE — PROGRESS NOTES
Assessment/Plan:  Chief Complaint   Patient presents with    Follow-up     3 Month      Patient Instructions    Here for BP check and is stable and also hx of hyperlipidemia and needs to lose weight to help with obesity, lost 10 pounds  BP stable  Will check BP at home and call if any problems  CDC guidelines discussed for COVID 19 prevention  Take all meds as directed for HTN and gerd and hyperlipidemia, no muscle aches or cramps  Recheck in office in 3 months  No problem-specific Assessment & Plan notes found for this encounter  Diagnoses and all orders for this visit:    Encounter for screening mammogram for malignant neoplasm of breast  -     Mammo screening bilateral w 3d & cad; Future    Essential hypertension  -     Comprehensive metabolic panel; Future    Hyperlipidemia, unspecified hyperlipidemia type  -     Comprehensive metabolic panel; Future  -     Lipid Panel with Direct LDL reflex; Future  -     TSH, 3rd generation; Future  -     T4, free    Obesity (BMI 30-39  9)    Gastroesophageal reflux disease, esophagitis presence not specified  -     CBC and differential; Future          Subjective:      Patient ID: Edilma Maria is a 72 y o  female  Follow-up (3 Month )      The following portions of the patient's history were reviewed and updated as appropriate: allergies, current medications, past family history, past medical history, past social history, past surgical history and problem list     Review of Systems   Constitutional: Negative  HENT: Negative  Eyes: Negative  Respiratory: Negative  Cardiovascular: Negative  Gastrointestinal: Negative  Endocrine: Negative  Genitourinary: Negative  Musculoskeletal: Negative  Skin: Negative  Allergic/Immunologic: Negative  Neurological: Negative  Hematological: Negative  Psychiatric/Behavioral: Negative            Objective:      /76 (BP Location: Left arm, Patient Position: Sitting, Cuff Size: Adult) Pulse 90   Temp (!) 96 9 °F (36 1 °C) (Temporal)   Resp 17   Ht 5' 4" (1 626 m)   Wt 84 8 kg (187 lb)   SpO2 97%   BMI 32 10 kg/m²          Physical Exam  Constitutional:       Appearance: She is well-developed  HENT:      Head: Normocephalic and atraumatic  Right Ear: External ear normal       Left Ear: External ear normal       Nose: Nose normal    Eyes:      Conjunctiva/sclera: Conjunctivae normal       Pupils: Pupils are equal, round, and reactive to light  Neck:      Musculoskeletal: Normal range of motion and neck supple  Cardiovascular:      Rate and Rhythm: Normal rate and regular rhythm  Heart sounds: Normal heart sounds  Pulmonary:      Effort: Pulmonary effort is normal       Breath sounds: Normal breath sounds  Musculoskeletal: Normal range of motion  Skin:     General: Skin is warm and dry  Neurological:      Mental Status: She is alert and oriented to person, place, and time  Deep Tendon Reflexes: Reflexes are normal and symmetric     Psychiatric:         Behavior: Behavior normal

## 2020-08-21 NOTE — PROGRESS NOTES
BMI Counseling: Body mass index is 32 1 kg/m²  The BMI is above normal  Nutrition recommendations include reducing portion sizes, decreasing overall calorie intake, 3-5 servings of fruits/vegetables daily, reducing fast food intake, consuming healthier snacks and reducing intake of cholesterol  Exercise recommendations include exercising 3-5 times per week

## 2020-08-21 NOTE — PATIENT INSTRUCTIONS
Here for BP check and is stable and also hx of hyperlipidemia and needs to lose weight to help with obesity, lost 10 pounds  BP stable  Will check BP at home and call if any problems  CDC guidelines discussed for COVID 19 prevention  Take all meds as directed for HTN and gerd and hyperlipidemia, no muscle aches or cramps  Recheck in office in 3 months

## 2020-10-14 ENCOUNTER — TELEPHONE (OUTPATIENT)
Dept: OBGYN CLINIC | Facility: MEDICAL CENTER | Age: 65
End: 2020-10-14

## 2020-11-03 ENCOUNTER — TELEPHONE (OUTPATIENT)
Dept: OBGYN CLINIC | Facility: MEDICAL CENTER | Age: 65
End: 2020-11-03

## 2020-11-03 DIAGNOSIS — B00.9 HSV INFECTION: ICD-10-CM

## 2020-11-03 RX ORDER — VALACYCLOVIR HYDROCHLORIDE 500 MG/1
500 TABLET, FILM COATED ORAL 2 TIMES DAILY
Qty: 6 TABLET | Refills: 1 | Status: SHIPPED | OUTPATIENT
Start: 2020-11-03 | End: 2020-11-06

## 2020-12-19 DIAGNOSIS — E78.5 HYPERLIPIDEMIA, UNSPECIFIED HYPERLIPIDEMIA TYPE: ICD-10-CM

## 2020-12-21 RX ORDER — EZETIMIBE 10 MG/1
TABLET ORAL
Qty: 90 TABLET | Refills: 1 | Status: SHIPPED | OUTPATIENT
Start: 2020-12-21 | End: 2021-05-19

## 2021-01-25 DIAGNOSIS — I10 ESSENTIAL HYPERTENSION: ICD-10-CM

## 2021-01-25 RX ORDER — LISINOPRIL AND HYDROCHLOROTHIAZIDE 20; 12.5 MG/1; MG/1
TABLET ORAL
Qty: 90 TABLET | Refills: 3 | Status: SHIPPED | OUTPATIENT
Start: 2021-01-25 | End: 2022-01-20

## 2021-05-19 DIAGNOSIS — E78.5 HYPERLIPIDEMIA, UNSPECIFIED HYPERLIPIDEMIA TYPE: ICD-10-CM

## 2021-05-19 RX ORDER — EZETIMIBE 10 MG/1
TABLET ORAL
Qty: 90 TABLET | Refills: 1 | Status: SHIPPED | OUTPATIENT
Start: 2021-05-19 | End: 2021-10-21 | Stop reason: SDUPTHER

## 2021-10-21 ENCOUNTER — TELEPHONE (OUTPATIENT)
Dept: FAMILY MEDICINE CLINIC | Facility: CLINIC | Age: 66
End: 2021-10-21

## 2021-10-21 DIAGNOSIS — E78.5 HYPERLIPIDEMIA, UNSPECIFIED HYPERLIPIDEMIA TYPE: ICD-10-CM

## 2021-10-21 RX ORDER — EZETIMIBE 10 MG/1
10 TABLET ORAL DAILY
Qty: 90 TABLET | Refills: 1 | Status: SHIPPED | OUTPATIENT
Start: 2021-10-21 | End: 2022-05-05

## 2021-10-22 DIAGNOSIS — Z00.00 HEALTH CARE MAINTENANCE: ICD-10-CM

## 2021-10-22 DIAGNOSIS — I10 ESSENTIAL HYPERTENSION: Primary | ICD-10-CM

## 2021-10-22 DIAGNOSIS — E78.5 HYPERLIPIDEMIA, UNSPECIFIED HYPERLIPIDEMIA TYPE: ICD-10-CM

## 2021-11-16 ENCOUNTER — APPOINTMENT (OUTPATIENT)
Dept: LAB | Facility: CLINIC | Age: 66
End: 2021-11-16
Payer: MEDICARE

## 2021-11-16 DIAGNOSIS — I10 ESSENTIAL HYPERTENSION: ICD-10-CM

## 2021-11-16 DIAGNOSIS — E78.5 HYPERLIPIDEMIA, UNSPECIFIED HYPERLIPIDEMIA TYPE: ICD-10-CM

## 2021-11-16 DIAGNOSIS — Z00.00 HEALTH CARE MAINTENANCE: ICD-10-CM

## 2021-11-16 LAB
ALBUMIN SERPL BCP-MCNC: 3.8 G/DL (ref 3.5–5)
ALP SERPL-CCNC: 69 U/L (ref 46–116)
ALT SERPL W P-5'-P-CCNC: 39 U/L (ref 12–78)
ANION GAP SERPL CALCULATED.3IONS-SCNC: 4 MMOL/L (ref 4–13)
AST SERPL W P-5'-P-CCNC: 20 U/L (ref 5–45)
BASOPHILS # BLD AUTO: 0.09 THOUSANDS/ΜL (ref 0–0.1)
BASOPHILS NFR BLD AUTO: 1 % (ref 0–1)
BILIRUB SERPL-MCNC: 0.53 MG/DL (ref 0.2–1)
BUN SERPL-MCNC: 18 MG/DL (ref 5–25)
CALCIUM SERPL-MCNC: 8.8 MG/DL (ref 8.3–10.1)
CHLORIDE SERPL-SCNC: 103 MMOL/L (ref 100–108)
CHOLEST SERPL-MCNC: 177 MG/DL (ref 50–200)
CO2 SERPL-SCNC: 29 MMOL/L (ref 21–32)
CREAT SERPL-MCNC: 0.86 MG/DL (ref 0.6–1.3)
EOSINOPHIL # BLD AUTO: 0.23 THOUSAND/ΜL (ref 0–0.61)
EOSINOPHIL NFR BLD AUTO: 4 % (ref 0–6)
ERYTHROCYTE [DISTWIDTH] IN BLOOD BY AUTOMATED COUNT: 12 % (ref 11.6–15.1)
GFR SERPL CREATININE-BSD FRML MDRD: 71 ML/MIN/1.73SQ M
GLUCOSE P FAST SERPL-MCNC: 91 MG/DL (ref 65–99)
HCT VFR BLD AUTO: 44.2 % (ref 34.8–46.1)
HDLC SERPL-MCNC: 58 MG/DL
HGB BLD-MCNC: 14.2 G/DL (ref 11.5–15.4)
IMM GRANULOCYTES # BLD AUTO: 0.02 THOUSAND/UL (ref 0–0.2)
IMM GRANULOCYTES NFR BLD AUTO: 0 % (ref 0–2)
LDLC SERPL CALC-MCNC: 103 MG/DL (ref 0–100)
LYMPHOCYTES # BLD AUTO: 2.51 THOUSANDS/ΜL (ref 0.6–4.47)
LYMPHOCYTES NFR BLD AUTO: 39 % (ref 14–44)
MCH RBC QN AUTO: 30.2 PG (ref 26.8–34.3)
MCHC RBC AUTO-ENTMCNC: 32.1 G/DL (ref 31.4–37.4)
MCV RBC AUTO: 94 FL (ref 82–98)
MONOCYTES # BLD AUTO: 0.65 THOUSAND/ΜL (ref 0.17–1.22)
MONOCYTES NFR BLD AUTO: 10 % (ref 4–12)
NEUTROPHILS # BLD AUTO: 2.92 THOUSANDS/ΜL (ref 1.85–7.62)
NEUTS SEG NFR BLD AUTO: 46 % (ref 43–75)
NRBC BLD AUTO-RTO: 0 /100 WBCS
PLATELET # BLD AUTO: 392 THOUSANDS/UL (ref 149–390)
PMV BLD AUTO: 10.5 FL (ref 8.9–12.7)
POTASSIUM SERPL-SCNC: 4.1 MMOL/L (ref 3.5–5.3)
PROT SERPL-MCNC: 6.9 G/DL (ref 6.4–8.2)
RBC # BLD AUTO: 4.7 MILLION/UL (ref 3.81–5.12)
SODIUM SERPL-SCNC: 136 MMOL/L (ref 136–145)
T4 FREE SERPL-MCNC: 0.84 NG/DL (ref 0.76–1.46)
TRIGL SERPL-MCNC: 81 MG/DL
TSH SERPL DL<=0.05 MIU/L-ACNC: 1.13 UIU/ML (ref 0.36–3.74)
WBC # BLD AUTO: 6.42 THOUSAND/UL (ref 4.31–10.16)

## 2021-11-16 PROCEDURE — 80053 COMPREHEN METABOLIC PANEL: CPT

## 2021-11-16 PROCEDURE — 84443 ASSAY THYROID STIM HORMONE: CPT

## 2021-11-16 PROCEDURE — 36415 COLL VENOUS BLD VENIPUNCTURE: CPT

## 2021-11-16 PROCEDURE — 85025 COMPLETE CBC W/AUTO DIFF WBC: CPT

## 2021-11-16 PROCEDURE — 84439 ASSAY OF FREE THYROXINE: CPT

## 2021-11-16 PROCEDURE — 80061 LIPID PANEL: CPT

## 2021-11-16 RX ORDER — CELECOXIB 200 MG/1
200 CAPSULE ORAL 2 TIMES DAILY PRN
COMMUNITY
Start: 2021-08-27 | End: 2022-08-27

## 2021-11-18 ENCOUNTER — OFFICE VISIT (OUTPATIENT)
Dept: FAMILY MEDICINE CLINIC | Facility: CLINIC | Age: 66
End: 2021-11-18
Payer: MEDICARE

## 2021-11-18 VITALS
WEIGHT: 196 LBS | DIASTOLIC BLOOD PRESSURE: 82 MMHG | OXYGEN SATURATION: 97 % | TEMPERATURE: 96.3 F | HEART RATE: 96 BPM | BODY MASS INDEX: 33.46 KG/M2 | HEIGHT: 64 IN | SYSTOLIC BLOOD PRESSURE: 140 MMHG

## 2021-11-18 DIAGNOSIS — Z00.00 HEALTH CARE MAINTENANCE: Primary | ICD-10-CM

## 2021-11-18 DIAGNOSIS — E78.5 HYPERLIPIDEMIA, UNSPECIFIED HYPERLIPIDEMIA TYPE: ICD-10-CM

## 2021-11-18 DIAGNOSIS — Z12.11 SCREENING FOR COLORECTAL CANCER: ICD-10-CM

## 2021-11-18 DIAGNOSIS — K21.9 GASTROESOPHAGEAL REFLUX DISEASE, UNSPECIFIED WHETHER ESOPHAGITIS PRESENT: ICD-10-CM

## 2021-11-18 DIAGNOSIS — Z00.00 MEDICARE ANNUAL WELLNESS VISIT, SUBSEQUENT: ICD-10-CM

## 2021-11-18 DIAGNOSIS — Z12.39 ENCOUNTER FOR SCREENING FOR MALIGNANT NEOPLASM OF BREAST, UNSPECIFIED SCREENING MODALITY: ICD-10-CM

## 2021-11-18 DIAGNOSIS — Z12.12 SCREENING FOR COLORECTAL CANCER: ICD-10-CM

## 2021-11-18 DIAGNOSIS — E66.9 OBESITY (BMI 30-39.9): ICD-10-CM

## 2021-11-18 DIAGNOSIS — Z12.31 ENCOUNTER FOR SCREENING MAMMOGRAM FOR MALIGNANT NEOPLASM OF BREAST: ICD-10-CM

## 2021-11-18 DIAGNOSIS — I10 PRIMARY HYPERTENSION: ICD-10-CM

## 2021-11-18 PROCEDURE — 1123F ACP DISCUSS/DSCN MKR DOCD: CPT | Performed by: FAMILY MEDICINE

## 2021-11-18 PROCEDURE — 3288F FALL RISK ASSESSMENT DOCD: CPT | Performed by: FAMILY MEDICINE

## 2021-11-18 PROCEDURE — G0439 PPPS, SUBSEQ VISIT: HCPCS | Performed by: FAMILY MEDICINE

## 2021-11-18 PROCEDURE — 1170F FXNL STATUS ASSESSED: CPT | Performed by: FAMILY MEDICINE

## 2021-11-18 PROCEDURE — 3725F SCREEN DEPRESSION PERFORMED: CPT | Performed by: FAMILY MEDICINE

## 2021-11-18 PROCEDURE — 3008F BODY MASS INDEX DOCD: CPT | Performed by: FAMILY MEDICINE

## 2021-11-18 PROCEDURE — 1036F TOBACCO NON-USER: CPT | Performed by: FAMILY MEDICINE

## 2021-11-18 PROCEDURE — 99214 OFFICE O/P EST MOD 30 MIN: CPT | Performed by: FAMILY MEDICINE

## 2021-11-18 PROCEDURE — 1160F RVW MEDS BY RX/DR IN RCRD: CPT | Performed by: FAMILY MEDICINE

## 2021-11-18 PROCEDURE — 1125F AMNT PAIN NOTED PAIN PRSNT: CPT | Performed by: FAMILY MEDICINE

## 2021-11-18 RX ORDER — MAGNESIUM 200 MG
TABLET ORAL
COMMUNITY

## 2021-11-18 RX ORDER — PANTOPRAZOLE SODIUM 40 MG/1
40 TABLET, DELAYED RELEASE ORAL 2 TIMES DAILY PRN
Qty: 60 TABLET | Refills: 5 | Status: SHIPPED | OUTPATIENT
Start: 2021-11-18 | End: 2022-05-05

## 2021-12-28 ENCOUNTER — TELEPHONE (OUTPATIENT)
Dept: FAMILY MEDICINE CLINIC | Facility: CLINIC | Age: 66
End: 2021-12-28

## 2021-12-28 DIAGNOSIS — R42 DIZZINESS: Primary | ICD-10-CM

## 2021-12-28 RX ORDER — MECLIZINE HYDROCHLORIDE 25 MG/1
25 TABLET ORAL DAILY PRN
Qty: 14 TABLET | Refills: 0 | Status: SHIPPED | OUTPATIENT
Start: 2021-12-28

## 2022-01-03 ENCOUNTER — OFFICE VISIT (OUTPATIENT)
Dept: FAMILY MEDICINE CLINIC | Facility: CLINIC | Age: 67
End: 2022-01-03
Payer: MEDICARE

## 2022-01-03 VITALS
SYSTOLIC BLOOD PRESSURE: 138 MMHG | TEMPERATURE: 98 F | DIASTOLIC BLOOD PRESSURE: 80 MMHG | HEART RATE: 111 BPM | RESPIRATION RATE: 16 BRPM | BODY MASS INDEX: 35.44 KG/M2 | OXYGEN SATURATION: 96 % | WEIGHT: 200 LBS | HEIGHT: 63 IN

## 2022-01-03 DIAGNOSIS — I10 PRIMARY HYPERTENSION: ICD-10-CM

## 2022-01-03 DIAGNOSIS — R42 DIZZINESS: Primary | ICD-10-CM

## 2022-01-03 DIAGNOSIS — E66.9 OBESITY (BMI 30-39.9): ICD-10-CM

## 2022-01-03 PROCEDURE — 99213 OFFICE O/P EST LOW 20 MIN: CPT | Performed by: FAMILY MEDICINE

## 2022-01-03 RX ORDER — IMIQUIMOD 12.5 MG/.25G
CREAM TOPICAL
COMMUNITY
Start: 2021-12-03

## 2022-01-03 NOTE — PATIENT INSTRUCTIONS
Dizziness resolved today and may use meclizine prn dizziness and will rec taking BP med as directed for HTN and also rec also losing weight as directed  Consider ENT if returns

## 2022-01-03 NOTE — PROGRESS NOTES
Assessment/Plan:  Chief Complaint   Patient presents with    Dizziness     Patient Instructions   Dizziness resolved today and may use meclizine prn dizziness and will rec taking BP med as directed for HTN and also rec also losing weight as directed  Consider ENT if returns  No problem-specific Assessment & Plan notes found for this encounter  Diagnoses and all orders for this visit:    Dizziness    Primary hypertension    Obesity (BMI 30-39  9)    Other orders  -     imiquimod (ALDARA) 5 % cream          Subjective:      Patient ID: Laz Urena is a 77 y o  female  Dizziness is better today and also states no problems today and no fever and no known exposure to covid and with booster  Patient denies fever or cp or sob  Some sinus issues and took Sudafed for this  The following portions of the patient's history were reviewed and updated as appropriate: allergies, current medications, past family history, past medical history, past social history, past surgical history and problem list     Review of Systems   Constitutional: Negative  HENT: Negative  Eyes: Negative  Respiratory: Negative  Cardiovascular: Negative  Gastrointestinal: Negative  Endocrine: Negative  Genitourinary: Negative  Musculoskeletal: Negative  Skin: Negative  Allergic/Immunologic: Negative  Neurological: Positive for dizziness (resolved today)  Hematological: Negative  Psychiatric/Behavioral: Negative  Objective:      /80 (BP Location: Left arm, Patient Position: Sitting, Cuff Size: Adult)   Pulse (!) 111   Temp 98 °F (36 7 °C) (Temporal)   Resp 16   Ht 5' 3" (1 6 m)   Wt 90 7 kg (200 lb)   SpO2 96%   BMI 35 43 kg/m²          Physical Exam  Constitutional:       Appearance: She is well-developed  HENT:      Head: Normocephalic and atraumatic        Right Ear: External ear normal       Left Ear: External ear normal    Eyes:      Conjunctiva/sclera: Conjunctivae normal       Pupils: Pupils are equal, round, and reactive to light  Cardiovascular:      Rate and Rhythm: Normal rate and regular rhythm  Heart sounds: Normal heart sounds  Pulmonary:      Effort: Pulmonary effort is normal       Breath sounds: Normal breath sounds  Musculoskeletal:         General: Normal range of motion  Cervical back: Normal range of motion and neck supple  Skin:     General: Skin is warm and dry  Neurological:      Mental Status: She is alert and oriented to person, place, and time  Deep Tendon Reflexes: Reflexes are normal and symmetric     Psychiatric:         Behavior: Behavior normal

## 2022-01-20 DIAGNOSIS — I10 ESSENTIAL HYPERTENSION: ICD-10-CM

## 2022-01-20 RX ORDER — LISINOPRIL AND HYDROCHLOROTHIAZIDE 20; 12.5 MG/1; MG/1
TABLET ORAL
Qty: 90 TABLET | Refills: 3 | Status: SHIPPED | OUTPATIENT
Start: 2022-01-20

## 2022-05-04 DIAGNOSIS — K21.9 GASTROESOPHAGEAL REFLUX DISEASE, UNSPECIFIED WHETHER ESOPHAGITIS PRESENT: ICD-10-CM

## 2022-05-04 DIAGNOSIS — E78.5 HYPERLIPIDEMIA, UNSPECIFIED HYPERLIPIDEMIA TYPE: ICD-10-CM

## 2022-05-05 RX ORDER — EZETIMIBE 10 MG/1
TABLET ORAL
Qty: 90 TABLET | Refills: 1 | Status: SHIPPED | OUTPATIENT
Start: 2022-05-05

## 2022-05-05 RX ORDER — PANTOPRAZOLE SODIUM 40 MG/1
40 TABLET, DELAYED RELEASE ORAL 2 TIMES DAILY PRN
Qty: 180 TABLET | Refills: 1 | Status: SHIPPED | OUTPATIENT
Start: 2022-05-05

## 2022-05-13 ENCOUNTER — RA CDI HCC (OUTPATIENT)
Dept: OTHER | Facility: HOSPITAL | Age: 67
End: 2022-05-13

## 2022-05-13 NOTE — PROGRESS NOTES
[address]  coding opportunities       Chart reviewed, no opportunity found: CHART REVIEWED, NO OPPORTUNITY FOUND        Patients Insurance     Medicare Insurance: Medicare

## 2022-10-28 DIAGNOSIS — E78.5 HYPERLIPIDEMIA, UNSPECIFIED HYPERLIPIDEMIA TYPE: ICD-10-CM

## 2022-10-28 DIAGNOSIS — K21.9 GASTROESOPHAGEAL REFLUX DISEASE, UNSPECIFIED WHETHER ESOPHAGITIS PRESENT: ICD-10-CM

## 2022-10-28 RX ORDER — EZETIMIBE 10 MG/1
TABLET ORAL
Qty: 90 TABLET | Refills: 1 | Status: SHIPPED | OUTPATIENT
Start: 2022-10-28

## 2022-10-28 RX ORDER — PANTOPRAZOLE SODIUM 40 MG/1
40 TABLET, DELAYED RELEASE ORAL 2 TIMES DAILY PRN
Qty: 180 TABLET | Refills: 1 | Status: SHIPPED | OUTPATIENT
Start: 2022-10-28

## 2022-12-06 ENCOUNTER — OFFICE VISIT (OUTPATIENT)
Dept: URGENT CARE | Facility: MEDICAL CENTER | Age: 67
End: 2022-12-06

## 2022-12-06 VITALS
SYSTOLIC BLOOD PRESSURE: 124 MMHG | DIASTOLIC BLOOD PRESSURE: 76 MMHG | TEMPERATURE: 97.7 F | OXYGEN SATURATION: 99 % | RESPIRATION RATE: 18 BRPM | HEART RATE: 105 BPM

## 2022-12-06 DIAGNOSIS — J40 BRONCHITIS: Primary | ICD-10-CM

## 2022-12-06 RX ORDER — PREDNISONE 20 MG/1
40 TABLET ORAL DAILY
Qty: 15 TABLET | Refills: 0 | Status: SHIPPED | OUTPATIENT
Start: 2022-12-06

## 2022-12-06 RX ORDER — ALBUTEROL SULFATE 90 UG/1
2 AEROSOL, METERED RESPIRATORY (INHALATION) EVERY 4 HOURS PRN
Qty: 1 G | Refills: 0 | Status: SHIPPED | OUTPATIENT
Start: 2022-12-06

## 2022-12-06 RX ORDER — FEXOFENADINE HYDROCHLORIDE 60 MG/1
60 TABLET, FILM COATED ORAL DAILY
COMMUNITY

## 2022-12-06 NOTE — PROGRESS NOTES
[facility]        NAME: Rey Arias is a 79 y o  female  : 1955    MRN: [id number]  DATE: 2022  TIME: 2:24 PM    Assessment and Plan   Bronchitis [J40]  1  Bronchitis  predniSONE 20 mg tablet    albuterol (PROVENTIL HFA,VENTOLIN HFA) 90 mcg/act inhaler        Patient is non-toxic appearing  Vital signs are normal   Will start prednisone  Patient denies prior history of blood glucose issues or diabetes  I did inform the patient that steroids can increase your blood sugar but its usually brief  Recommend he/she monitor for signs and symptoms of increased blood glucose levels like increased thirst, increased urination, blurred vision and/or fatigue  These symptoms usually resolve after stopping the steroid  If symptoms develop, you should call your  family doctor or go to the nearest emergency room for an evaluation  Patient advised to wear a mask while symptomatic  Wash hands frequently  F/u with PCP as outpatient  Reasons to present to the ED were reviewed  Patient Instructions     Acute Bronchitis   WHAT YOU NEED TO KNOW:   Acute bronchitis is swelling and irritation in your lungs  It is usually caused by a virus and most often happens in the winter  Bronchitis may also be caused by bacteria or by a chemical irritant, such as smoke  DISCHARGE INSTRUCTIONS:   Return to the emergency department if:   · You cough up blood  · Your lips or fingernails turn blue  · You feel like you are not getting enough air when you breathe  Call your doctor if:   · Your symptoms do not go away or get worse, even after treatment  · Your cough does not get better within 4 weeks  · You have questions or concerns about your condition or care  Medicines: You may  need any of the following:  · Cough suppressants  decrease your urge to cough  · Decongestants  help loosen mucus in your lungs and make it easier to cough up   This can help you breathe easier  · Inhalers  may be given  Your healthcare provider may give you one or more inhalers to help you breathe easier and cough less  An inhaler gives your medicine to open your airways  Ask your healthcare provider to show you how to use your inhaler correctly  · Antibiotics  may be given for up to 5 days if your bronchitis is caused by bacteria  · Acetaminophen  decreases pain and fever  It is available without a doctor's order  Ask how much to take and how often to take it  Follow directions  Read the labels of all other medicines you are using to see if they also contain acetaminophen, or ask your doctor or pharmacist  Acetaminophen can cause liver damage if not taken correctly  Do not use more than 4 grams (4,000 milligrams) total of acetaminophen in one day  · NSAIDs  help decrease swelling and pain or fever  This medicine is available with or without a doctor's order  NSAIDs can cause stomach bleeding or kidney problems in certain people  If you take blood thinner medicine, always ask your healthcare provider if NSAIDs are safe for you  Always read the medicine label and follow directions  · Take your medicine as directed  Contact your healthcare provider if you think your medicine is not helping or if you have side effects  Tell him of her if you are allergic to any medicine  Keep a list of the medicines, vitamins, and herbs you take  Include the amounts, and when and why you take them  Bring the list or the pill bottles to follow-up visits  Carry your medicine list with you in case of an emergency  Self-care:   · Drink liquids as directed  You may need to drink more liquids than usual to stay hydrated  Ask how much liquid to drink each day and which liquids are best for you  · Use a cool mist humidifier  to increase air moisture in your home  This may make it easier for you to breathe and help decrease your cough       · Get more rest   Rest helps your body to heal  Slowly start to do more each day  Rest when you feel it is needed  · Avoid irritants in the air  Avoid chemicals, fumes, and dust  Wear a face mask if you must work around dust or fumes  Stay inside on days when air pollution levels are high  If you have allergies, stay inside when pollen counts are high  Do not use aerosol products, such as spray-on deodorant, bug spray, and hair spray  · Do not smoke or be around others who are smoking  Nicotine and other chemicals in cigarettes and cigars can cause lung damage  Ask your healthcare provider for information if you currently smoke and need help to quit  E-cigarettes or smokeless tobacco still contain nicotine  Talk to your healthcare provider before you use these products  Prevent acute bronchitis:       1  Ask about vaccines you may need  Get a flu vaccine each year as soon as recommended, usually in September or October  Ask your healthcare provider if you should also get a pneumonia or COVID-19 vaccine  Your healthcare provider can tell you if you should also get other vaccines, and when to get them  2  Prevent the spread of germs  You can decrease your risk for acute bronchitis and other illnesses by doing the following:     ? Wash your hands often with soap and water  Carry germ-killing hand lotion or gel with you  You can use the lotion or gel to clean your hands when soap and water are not available  ? Do not touch your eyes, nose, or mouth unless you have washed your hands first     ? Always cover your mouth when you cough to prevent the spread of germs  It is best to cough into a tissue or your shirt sleeve instead of into your hand  Ask those around you to cover their mouths when they cough  ? Try to avoid people who have a cold or the flu  If you are sick, stay away from others as much as possible  Follow up with your doctor as directed:  Write down questions you have so you will remember to ask them during your follow-up visits    © Copyright [employer] 2022 Information is for End User's use only and may not be sold, redistributed or otherwise used for commercial purposes  All illustrations and images included in CareNotes® are the copyrighted property of [employer]  or [employer]  The above information is an  only  It is not intended as medical advice for individual conditions or treatments  Talk to your doctor, nurse or pharmacist before following any medical regimen to see if it is safe and effective for you  Follow up with PCP in 3-5 days  Proceed to  ER if symptoms worsen  Chief Complaint     Chief Complaint   Patient presents with   • Cough     Patient c/o dry cough, chest congestion, and chest tightness x 1 week           History of Present Illness       15-year-old female presents today reporting cough, congestion, and hoarse voice intermittently over the last 2 weeks  No fever  No shortness of breath  She states she is been using her 's albuterol inhaler and taking Mucinex with minimal relief  The cough is not productive  She is no history of underlying lung disease, other than having reported asthma as a child  Review of Systems   Review of Systems   Constitutional: Negative for chills, fatigue and fever  HENT: Positive for congestion, postnasal drip, sore throat and voice change  Negative for trouble swallowing  Respiratory: Positive for cough and chest tightness  Negative for shortness of breath and wheezing  Cardiovascular: Negative for leg swelling  Gastrointestinal: Negative for abdominal pain  Genitourinary: Negative for dysuria  Musculoskeletal: Negative for back pain  Skin: Negative for rash  Allergic/Immunologic: Negative for immunocompromised state  Neurological: Negative for dizziness, light-headedness and headaches  Psychiatric/Behavioral: Negative for confusion           Current Medications       Current Outpatient Medications:   •  albuterol (PROVENTIL HFA,VENTOLIN HFA) 90 mcg/act inhaler, Inhale 2 puffs every 4 (four) hours as needed for wheezing, Disp: 1 g, Rfl: 0  •  predniSONE 20 mg tablet, Take 2 tablets (40 mg total) by mouth daily, Disp: 15 tablet, Rfl: 0  •  acetaminophen (TYLENOL) 500 mg tablet, Take 1,000 mg by mouth every 6 (six) hours as needed for mild pain, Disp: , Rfl:   •  acetaminophen (TYLENOL) 500 mg tablet, Take 1 tablet (500 mg total) by mouth every 6 (six) hours as needed for mild pain, Disp: 30 tablet, Rfl: 0  •  Buprenorphine HCl (BELBUCA) 75 MCG FILM, Apply to cheek daily as needed, Disp: , Rfl:   •  celecoxib (CeleBREX) 200 mg capsule, Take 200 mg by mouth 2 (two) times a day as needed, Disp: , Rfl:   •  Cholecalciferol 1000 units CHEW, Chew daily , Disp: , Rfl:   •  Cyanocobalamin (Vitamin B-12) 1000 MCG SUBL, Place under the tongue, Disp: , Rfl:   •  cyclobenzaprine (FLEXERIL) 10 mg tablet, TAKE 1 TABLET (10 MG TOTAL) BY MOUTH 2 (TWO) TIMES A DAY AS NEEDED FOR MUSCLE SPASMS , Disp: , Rfl: 2  •  docusate sodium (COLACE) 100 mg capsule, Take 1 capsule (100 mg total) by mouth 2 (two) times a day, Disp: 10 capsule, Rfl: 0  •  ezetimibe (ZETIA) 10 mg tablet, TAKE 1 TABLET BY MOUTH EVERY DAY, Disp: 90 tablet, Rfl: 1  •  fexofenadine (ALLEGRA) 60 MG tablet, Take 60 mg by mouth daily, Disp: , Rfl:   •  ibuprofen (MOTRIN) 200 mg tablet, Take 200-800 mg by mouth every 6 (six) hours as needed for mild pain (Patient not taking: Reported on 11/18/2021 ), Disp: , Rfl:   •  imiquimod (ALDARA) 5 % cream, , Disp: , Rfl:   •  lisinopril-hydrochlorothiazide (PRINZIDE,ZESTORETIC) 20-12 5 MG per tablet, TAKE 1 TABLET DAILY, Disp: 90 tablet, Rfl: 3  •  MAGNESIUM GLUCONATE PO, Take 200 mg by mouth daily, Disp: , Rfl:   •  meclizine (ANTIVERT) 25 mg tablet, Take 1 tablet (25 mg total) by mouth daily as needed for dizziness or nausea, Disp: 14 tablet, Rfl: 0  •  Multiple Vitamin (MULTIVITAMIN) capsule, Take 1 capsule by mouth daily  , Disp: , Rfl: •  nortriptyline (PAMELOR) 10 mg capsule, Take 10 mg by mouth daily at bedtime , Disp: , Rfl:   •  pantoprazole (PROTONIX) 40 mg tablet, TAKE 1 TABLET (40 MG TOTAL) BY MOUTH 2 (TWO) TIMES A DAY AS NEEDED (GERD), Disp: 180 tablet, Rfl: 1  •  valACYclovir (VALTREX) 500 mg tablet, Take 1 tablet (500 mg total) by mouth 2 (two) times a day for 3 days, Disp: 6 tablet, Rfl: 1    Current Allergies     Allergies as of 12/06/2022 - Reviewed 12/06/2022   Allergen Reaction Noted   • Lyrica [pregabalin] Itching and Rash 04/21/2012   • Buprenorphine Itching 09/16/2016   • Diazepam Other (See Comments) 10/16/2005   • Hydrocodone Itching 01/28/2020   • Oxycodone-acetaminophen Itching 11/20/2015   • Tramadol Itching 11/20/2015   • Nucynta [tapentadol] Itching 02/02/2018            The following portions of the patient's history were reviewed and updated as appropriate: allergies, current medications, past family history, past medical history, past social history, past surgical history and problem list      Past Medical History:   Diagnosis Date   • Arthritis    • Asthma     childhood   • Chronic narcotic dependence ([address] )    • Chronic pain disorder     low back- 2 back surgeries in past   • Cystocele with rectocele    • Diverticulosis    • GERD (gastroesophageal reflux disease)    • Hx MRSA infection     2014- left thigh after surgery for melanoma   • Hyperlipidemia    • Hypertension    • Malignant melanoma ([address] )     left thigh/ right shoulder 2014   • Migraines    • Premature ventricular contraction     Last Assessed:  8/5/13   • Uterovaginal prolapse    • Wears partial dentures     upper       Past Surgical History:   Procedure Laterality Date   • BACK SURGERY      lumbar x 2   • BUNIONECTOMY Bilateral    • CHOLECYSTECTOMY     • COLONOSCOPY      Complete   • ESOPHAGOGASTRODUODENOSCOPY      Diagnostic   • INCISIONAL HERNIA REPAIR     • UT CYSTOURETHROSCOPY N/A 1/28/2020    Procedure: CYSTOSCOPY;  Surgeon: Kaylee Hardin MD; Location: [facility];  Service: UroGynecology          • NM REVAGINAL PROLAPSE,SACROSP LIG N/A 1/28/2020    Procedure: VE COLPOSUSPENSION (Irene Bernheim) with posterior repair;  Surgeon: Adam Munroe MD;  Location: [facility];  Service: UroGynecology          • NM SLING OPER STRES INCONTINENCE N/A 1/28/2020    Procedure: SINGLE INCISION SLING;  Surgeon: Adam Munroe MD;  Location: [facility];  Service: UroGynecology          • SKIN CANCER EXCISION      melanoma left thigh / right shoulder       Family History   Problem Relation Age of Onset   • Stomach cancer Mother    • Diabetes type II Mother    • Prostate cancer Mother    • Stomach cancer Father    • Prostate cancer Paternal Grandfather    • Lung cancer Paternal Uncle    • Brain cancer Paternal Uncle          Medications have been verified  Objective   /76   Pulse 105   Temp 97 7 °F (36 5 °C)   Resp 18   SpO2 99%        Physical Exam     Physical Exam  Vitals and nursing note reviewed  Constitutional:       Appearance: Normal appearance  HENT:      Head: Normocephalic and atraumatic  Right Ear: Tympanic membrane, ear canal and external ear normal       Left Ear: Tympanic membrane, ear canal and external ear normal       Nose: Nose normal       Mouth/Throat:      Mouth: Mucous membranes are moist    Eyes:      Extraocular Movements: Extraocular movements intact  Pupils: Pupils are equal, round, and reactive to light  Cardiovascular:      Rate and Rhythm: Normal rate and regular rhythm  Pulmonary:      Effort: Pulmonary effort is normal       Comments: Slightly decreased breath sounds in all lung fields with a very slight expiratory wheeze in left upper  No rhonchi or rales appreciated  Skin:     General: Skin is warm and dry  Capillary Refill: Capillary refill takes less than 2 seconds  Neurological:      General: No focal deficit present  Mental Status: She is alert and oriented to person, place, and time  Psychiatric:         Mood and Affect: Mood normal          Behavior: Behavior normal

## 2022-12-06 NOTE — PATIENT INSTRUCTIONS
Acute Bronchitis   WHAT YOU NEED TO KNOW:   Acute bronchitis is swelling and irritation in your lungs  It is usually caused by a virus and most often happens in the winter  Bronchitis may also be caused by bacteria or by a chemical irritant, such as smoke  DISCHARGE INSTRUCTIONS:   Return to the emergency department if:   You cough up blood  Your lips or fingernails turn blue  You feel like you are not getting enough air when you breathe  Call your doctor if:   Your symptoms do not go away or get worse, even after treatment  Your cough does not get better within 4 weeks  You have questions or concerns about your condition or care  Medicines: You may  need any of the following:  Cough suppressants  decrease your urge to cough  Decongestants  help loosen mucus in your lungs and make it easier to cough up  This can help you breathe easier  Inhalers  may be given  Your healthcare provider may give you one or more inhalers to help you breathe easier and cough less  An inhaler gives your medicine to open your airways  Ask your healthcare provider to show you how to use your inhaler correctly  Antibiotics  may be given for up to 5 days if your bronchitis is caused by bacteria  Acetaminophen  decreases pain and fever  It is available without a doctor's order  Ask how much to take and how often to take it  Follow directions  Read the labels of all other medicines you are using to see if they also contain acetaminophen, or ask your doctor or pharmacist  Acetaminophen can cause liver damage if not taken correctly  Do not use more than 4 grams (4,000 milligrams) total of acetaminophen in one day  NSAIDs  help decrease swelling and pain or fever  This medicine is available with or without a doctor's order  NSAIDs can cause stomach bleeding or kidney problems in certain people  If you take blood thinner medicine, always ask your healthcare provider if NSAIDs are safe for you   Always read the medicine label and follow directions  Take your medicine as directed  Contact your healthcare provider if you think your medicine is not helping or if you have side effects  Tell him of her if you are allergic to any medicine  Keep a list of the medicines, vitamins, and herbs you take  Include the amounts, and when and why you take them  Bring the list or the pill bottles to follow-up visits  Carry your medicine list with you in case of an emergency  Self-care:   Drink liquids as directed  You may need to drink more liquids than usual to stay hydrated  Ask how much liquid to drink each day and which liquids are best for you  Use a cool mist humidifier  to increase air moisture in your home  This may make it easier for you to breathe and help decrease your cough  Get more rest   Rest helps your body to heal  Slowly start to do more each day  Rest when you feel it is needed  Avoid irritants in the air  Avoid chemicals, fumes, and dust  Wear a face mask if you must work around dust or fumes  Stay inside on days when air pollution levels are high  If you have allergies, stay inside when pollen counts are high  Do not use aerosol products, such as spray-on deodorant, bug spray, and hair spray  Do not smoke or be around others who are smoking  Nicotine and other chemicals in cigarettes and cigars can cause lung damage  Ask your healthcare provider for information if you currently smoke and need help to quit  E-cigarettes or smokeless tobacco still contain nicotine  Talk to your healthcare provider before you use these products  Prevent acute bronchitis:       Ask about vaccines you may need  Get a flu vaccine each year as soon as recommended, usually in September or October  Ask your healthcare provider if you should also get a pneumonia or COVID-19 vaccine  Your healthcare provider can tell you if you should also get other vaccines, and when to get them  Prevent the spread of germs    You can decrease your risk for acute bronchitis and other illnesses by doing the following:     Wash your hands often with soap and water  Carry germ-killing hand lotion or gel with you  You can use the lotion or gel to clean your hands when soap and water are not available  Do not touch your eyes, nose, or mouth unless you have washed your hands first     Always cover your mouth when you cough to prevent the spread of germs  It is best to cough into a tissue or your shirt sleeve instead of into your hand  Ask those around you to cover their mouths when they cough  Try to avoid people who have a cold or the flu  If you are sick, stay away from others as much as possible  Follow up with your doctor as directed:  Write down questions you have so you will remember to ask them during your follow-up visits  © Copyright [employer] 2022 Information is for End User's use only and may not be sold, redistributed or otherwise used for commercial purposes  All illustrations and images included in CareNotes® are the copyrighted property of [employer]  or [employer]   The above information is an  only  It is not intended as medical advice for individual conditions or treatments  Talk to your doctor, nurse or pharmacist before following any medical regimen to see if it is safe and effective for you

## 2023-01-16 DIAGNOSIS — I10 ESSENTIAL HYPERTENSION: ICD-10-CM

## 2023-01-16 RX ORDER — LISINOPRIL AND HYDROCHLOROTHIAZIDE 20; 12.5 MG/1; MG/1
TABLET ORAL
Qty: 90 TABLET | Refills: 3 | Status: SHIPPED | OUTPATIENT
Start: 2023-01-16

## 2023-03-22 DIAGNOSIS — K21.9 GASTROESOPHAGEAL REFLUX DISEASE, UNSPECIFIED WHETHER ESOPHAGITIS PRESENT: ICD-10-CM

## 2023-03-22 DIAGNOSIS — E78.5 HYPERLIPIDEMIA, UNSPECIFIED HYPERLIPIDEMIA TYPE: ICD-10-CM

## 2023-03-22 RX ORDER — EZETIMIBE 10 MG/1
TABLET ORAL
Qty: 90 TABLET | Refills: 1 | Status: SHIPPED | OUTPATIENT
Start: 2023-03-22

## 2023-03-22 RX ORDER — PANTOPRAZOLE SODIUM 40 MG/1
40 TABLET, DELAYED RELEASE ORAL 2 TIMES DAILY PRN
Qty: 180 TABLET | Refills: 1 | Status: SHIPPED | OUTPATIENT
Start: 2023-03-22

## 2023-03-22 NOTE — TELEPHONE ENCOUNTER
Requested medication(s) are due for refill today: Yes  Patient has already received a courtesy refill: No  Other reason request has been forwarded to provider:  Pt scheduled

## 2023-04-19 ENCOUNTER — APPOINTMENT (OUTPATIENT)
Dept: LAB | Facility: CLINIC | Age: 68
End: 2023-04-19

## 2023-04-19 DIAGNOSIS — E78.5 HYPERLIPIDEMIA, UNSPECIFIED HYPERLIPIDEMIA TYPE: ICD-10-CM

## 2023-04-19 DIAGNOSIS — Z00.00 HEALTH CARE MAINTENANCE: ICD-10-CM

## 2023-04-19 DIAGNOSIS — K21.9 GASTROESOPHAGEAL REFLUX DISEASE, UNSPECIFIED WHETHER ESOPHAGITIS PRESENT: ICD-10-CM

## 2023-04-19 LAB
ALBUMIN SERPL BCP-MCNC: 3.5 G/DL (ref 3.5–5)
ALP SERPL-CCNC: 64 U/L (ref 46–116)
ALT SERPL W P-5'-P-CCNC: 45 U/L (ref 12–78)
ANION GAP SERPL CALCULATED.3IONS-SCNC: 2 MMOL/L (ref 4–13)
AST SERPL W P-5'-P-CCNC: 13 U/L (ref 5–45)
BASOPHILS # BLD MANUAL: 0 THOUSAND/UL (ref 0–0.1)
BASOPHILS NFR MAR MANUAL: 0 % (ref 0–1)
BILIRUB SERPL-MCNC: 0.73 MG/DL (ref 0.2–1)
BUN SERPL-MCNC: 24 MG/DL (ref 5–25)
CALCIUM SERPL-MCNC: 9 MG/DL (ref 8.3–10.1)
CHLORIDE SERPL-SCNC: 102 MMOL/L (ref 96–108)
CHOLEST SERPL-MCNC: 180 MG/DL
CO2 SERPL-SCNC: 31 MMOL/L (ref 21–32)
CREAT SERPL-MCNC: 0.88 MG/DL (ref 0.6–1.3)
EOSINOPHIL # BLD MANUAL: 0 THOUSAND/UL (ref 0–0.4)
EOSINOPHIL NFR BLD MANUAL: 0 % (ref 0–6)
ERYTHROCYTE [DISTWIDTH] IN BLOOD BY AUTOMATED COUNT: 12.6 % (ref 11.6–15.1)
GFR SERPL CREATININE-BSD FRML MDRD: 68 ML/MIN/1.73SQ M
GLUCOSE P FAST SERPL-MCNC: 89 MG/DL (ref 65–99)
HCT VFR BLD AUTO: 46.9 % (ref 34.8–46.1)
HDLC SERPL-MCNC: 62 MG/DL
HGB BLD-MCNC: 14.8 G/DL (ref 11.5–15.4)
LDLC SERPL CALC-MCNC: 98 MG/DL (ref 0–100)
LYMPHOCYTES # BLD AUTO: 48 % (ref 14–44)
LYMPHOCYTES # BLD AUTO: 6.65 THOUSAND/UL (ref 0.6–4.47)
MCH RBC QN AUTO: 29.1 PG (ref 26.8–34.3)
MCHC RBC AUTO-ENTMCNC: 31.6 G/DL (ref 31.4–37.4)
MCV RBC AUTO: 92 FL (ref 82–98)
MONOCYTES # BLD AUTO: 1.25 THOUSAND/UL (ref 0–1.22)
MONOCYTES NFR BLD: 9 % (ref 4–12)
NEUTROPHILS # BLD MANUAL: 5.96 THOUSAND/UL (ref 1.85–7.62)
NEUTS SEG NFR BLD AUTO: 43 % (ref 43–75)
PLATELET # BLD AUTO: 508 THOUSANDS/UL (ref 149–390)
PLATELET BLD QL SMEAR: ABNORMAL
PMV BLD AUTO: 9.9 FL (ref 8.9–12.7)
POTASSIUM SERPL-SCNC: 4.9 MMOL/L (ref 3.5–5.3)
PROT SERPL-MCNC: 6.8 G/DL (ref 6.4–8.4)
RBC # BLD AUTO: 5.09 MILLION/UL (ref 3.81–5.12)
SODIUM SERPL-SCNC: 135 MMOL/L (ref 135–147)
TRIGL SERPL-MCNC: 98 MG/DL
WBC # BLD AUTO: 13.86 THOUSAND/UL (ref 4.31–10.16)

## 2023-05-11 DIAGNOSIS — B02.9 HERPES ZOSTER WITHOUT COMPLICATION: ICD-10-CM

## 2023-05-11 RX ORDER — VALACYCLOVIR HYDROCHLORIDE 500 MG/1
TABLET, FILM COATED ORAL
Qty: 90 TABLET | Refills: 2 | Status: SHIPPED | OUTPATIENT
Start: 2023-05-11 | End: 2023-08-09

## 2023-05-15 ENCOUNTER — APPOINTMENT (OUTPATIENT)
Dept: LAB | Facility: CLINIC | Age: 68
End: 2023-05-15

## 2023-05-15 ENCOUNTER — CONSULT (OUTPATIENT)
Dept: HEMATOLOGY ONCOLOGY | Facility: CLINIC | Age: 68
End: 2023-05-15

## 2023-05-15 VITALS
WEIGHT: 203 LBS | HEIGHT: 64 IN | HEART RATE: 100 BPM | BODY MASS INDEX: 34.66 KG/M2 | SYSTOLIC BLOOD PRESSURE: 140 MMHG | TEMPERATURE: 97.8 F | OXYGEN SATURATION: 97 % | DIASTOLIC BLOOD PRESSURE: 80 MMHG

## 2023-05-15 DIAGNOSIS — R79.89 ELEVATED PLATELET COUNT: ICD-10-CM

## 2023-05-15 DIAGNOSIS — R79.89 ELEVATED PLATELET COUNT: Primary | ICD-10-CM

## 2023-05-15 LAB
BASOPHILS # BLD AUTO: 0.1 THOUSANDS/ÂΜL (ref 0–0.1)
BASOPHILS NFR BLD AUTO: 2 % (ref 0–1)
EOSINOPHIL # BLD AUTO: 0.14 THOUSAND/ÂΜL (ref 0–0.61)
EOSINOPHIL NFR BLD AUTO: 2 % (ref 0–6)
ERYTHROCYTE [DISTWIDTH] IN BLOOD BY AUTOMATED COUNT: 13 % (ref 11.6–15.1)
HCT VFR BLD AUTO: 40.5 % (ref 34.8–46.1)
HGB BLD-MCNC: 13.2 G/DL (ref 11.5–15.4)
IMM GRANULOCYTES # BLD AUTO: 0.02 THOUSAND/UL (ref 0–0.2)
IMM GRANULOCYTES NFR BLD AUTO: 0 % (ref 0–2)
LYMPHOCYTES # BLD AUTO: 2.4 THOUSANDS/ÂΜL (ref 0.6–4.47)
LYMPHOCYTES NFR BLD AUTO: 36 % (ref 14–44)
MCH RBC QN AUTO: 30 PG (ref 26.8–34.3)
MCHC RBC AUTO-ENTMCNC: 32.6 G/DL (ref 31.4–37.4)
MCV RBC AUTO: 92 FL (ref 82–98)
MONOCYTES # BLD AUTO: 0.69 THOUSAND/ÂΜL (ref 0.17–1.22)
MONOCYTES NFR BLD AUTO: 10 % (ref 4–12)
NEUTROPHILS # BLD AUTO: 3.38 THOUSANDS/ÂΜL (ref 1.85–7.62)
NEUTS SEG NFR BLD AUTO: 50 % (ref 43–75)
NRBC BLD AUTO-RTO: 0 /100 WBCS
PLATELET # BLD AUTO: 426 THOUSANDS/UL (ref 149–390)
PMV BLD AUTO: 10.1 FL (ref 8.9–12.7)
RBC # BLD AUTO: 4.4 MILLION/UL (ref 3.81–5.12)
WBC # BLD AUTO: 6.73 THOUSAND/UL (ref 4.31–10.16)

## 2023-05-15 RX ORDER — MULTIVIT WITH MINERALS/LUTEIN
750 TABLET ORAL DAILY
COMMUNITY

## 2023-05-15 RX ORDER — ZINC GLUCONATE 50 MG
50 TABLET ORAL DAILY
COMMUNITY

## 2023-05-15 NOTE — PROGRESS NOTES
Hematology/Oncology Outpatient Consult  Renee Hollis 79 y o  female [id number]    Date:  5/15/2023      Assessment and Plan:    1  Elevated platelet count  [id number] male presents for consultation regarding elevated platelet count  This was seen on 1 reading in April 2023  Patient states within 2 weeks prior she was on a Medrol Dosepak from pain management  Reviewed that this could be a contributor to her elevated platelet count, most definitely related to her elevated white blood cell count  Hematocrit was barely elevated  At this time recommend repeating CBC  She will complete today  If this still remains abnormal and further work-up will be completed  She is advised to follow-up with her PCP regarding her intermittent shortness of breath  She was advised to follow-up with her urogynecologist regarding urinary complaints  - Ambulatory Referral to Hematology / Oncology  - CBC and differential; Future    HPI:  40-year-old female presents for consultation regarding elevated platelet count  2014 she had melanoma removed on her thigh and upper arm  She follows with [facility] every 6 months up to date within the last 1 month  ROS: Review of Systems   Constitutional: Negative for activity change, appetite change, fatigue and unexpected weight change  Respiratory: Positive for shortness of breath (Intermittent)  Negative for cough  Cardiovascular: Negative for leg swelling  Gastrointestinal: Negative for abdominal pain, constipation, diarrhea and nausea  Genitourinary: Negative for dysuria and hematuria  Sometimes difficulty with urine stream     Musculoskeletal: Positive for arthralgias (Mild occasional in bilateral hands) and back pain (Chronic, follows with pain management at [facility])  Skin: Negative  Neurological: Positive for dizziness (Intermittent, resolves with Dramamine)  Negative for weakness, light-headedness, numbness and headaches  Psychiatric/Behavioral: Negative          Past Medical History:   Diagnosis Date   • Arthritis    • Asthma     childhood   • Chronic narcotic dependence ([address] )    • Chronic pain disorder     low back- 2 back surgeries in past   • Cystocele with rectocele    • Diverticulosis    • GERD (gastroesophageal reflux disease)    • Hx MRSA infection     2014- left thigh after surgery for melanoma   • Hyperlipidemia    • Hypertension    • Malignant melanoma ([address] )     left thigh/ right shoulder 2014   • Migraines    • Premature ventricular contraction     Last Assessed:  8/5/13   • Uterovaginal prolapse    • Wears partial dentures     upper       Past Surgical History:   Procedure Laterality Date   • BACK SURGERY      lumbar x 2   • BUNIONECTOMY Bilateral    • CHOLECYSTECTOMY     • COLONOSCOPY      Complete   • ESOPHAGOGASTRODUODENOSCOPY      Diagnostic   • INCISIONAL HERNIA REPAIR     • ID COLPOPEXY VAGINAL EXTRAPERITONEAL APPROACH N/A 1/28/2020    Procedure: VE COLPOSUSPENSION (ENPLACE) with posterior repair;  Surgeon: Eren Benjamin MD;  Location: [facility];  Service: UroGynecology          • ID CYSTOURETHROSCOPY N/A 1/28/2020    Procedure: CYSTOSCOPY;  Surgeon: Eren Benjamin MD;  Location: [facility];  Service: UroGynecology          • ID SLING OPERATION STRESS INCONTINENCE N/A 1/28/2020    Procedure: SINGLE INCISION SLING;  Surgeon: Eren Benjamin MD;  Location: [facility];  Service: UroGynecology          • SKIN CANCER EXCISION      melanoma left thigh / right shoulder       Social History     Socioeconomic History   • Marital status: /Civil Union     Spouse name: None   • Number of children: None   • Years of education: None   • Highest education level: None   Occupational History   • None   Tobacco Use   • Smoking status: Never   • Smokeless tobacco: Never   • Tobacco comments:     Per allscripts:  Never a smoker/Unknown if every smoked   Vaping Use   • Vaping Use: Never used   Substance and Sexual Activity   • Alcohol use: Yes     Comment: wine/ liquor, rarely   • Drug use: No   • Sexual activity: Not Currently     Partners: Male   Other Topics Concern   • None   Social History Narrative    Feels safe at home     Social Determinants of Health     Financial Resource Strain: Not on file   Food Insecurity: Not on file   Transportation Needs: Not on file   Physical Activity: Not on file   Stress: Not on file   Social Connections: Not on file   Intimate Partner Violence: Not on file   Housing Stability: Not on file       Family History   Problem Relation Age of Onset   • Diabetes type II Mother    • Stomach cancer Father    • Esophageal cancer Father    • Lung cancer Paternal Uncle    • Brain cancer Paternal Uncle    • Prostate cancer Paternal Grandfather        Allergies   Allergen Reactions   • Lyrica [Pregabalin] Itching and Rash     Reaction Date: [id number];    • Buprenorphine Itching     If takes more than 3 days starts with itching   • Diazepam Other (See Comments)     gets hyper   • Hydrocodone Itching   • Oxycodone-Acetaminophen Itching   • Tramadol Itching   • Nucynta [Tapentadol] Itching         Current Outpatient Medications:   •  acetaminophen (TYLENOL) 500 mg tablet, Take 1,000 mg by mouth every 6 (six) hours as needed for mild pain, Disp: , Rfl:   •  acetaminophen (TYLENOL) 500 mg tablet, Take 1 tablet (500 mg total) by mouth every 6 (six) hours as needed for mild pain, Disp: 30 tablet, Rfl: 0  •  albuterol (PROVENTIL HFA,VENTOLIN HFA) 90 mcg/act inhaler, Inhale 2 puffs every 4 (four) hours as needed for wheezing, Disp: 1 g, Rfl: 0  •  ascorbic acid (VITAMIN C) 250 mg tablet, Take 750 mg by mouth daily, Disp: , Rfl:   •  Cholecalciferol 1000 units CHEW, Chew daily , Disp: , Rfl:   •  Cyanocobalamin (Vitamin B-12) 1000 MCG SUBL, Place under the tongue, Disp: , Rfl:   •  cyclobenzaprine (FLEXERIL) 10 mg tablet, TAKE 1 TABLET (10 MG TOTAL) BY MOUTH 2 (TWO) TIMES A DAY AS NEEDED FOR MUSCLE SPASMS , Disp: ", Rfl: 2  •  ezetimibe (ZETIA) 10 mg tablet, TAKE 1 TABLET BY MOUTH EVERY DAY, Disp: 90 tablet, Rfl: 1  •  lisinopril-hydrochlorothiazide (PRINZIDE,ZESTORETIC) 20-12 5 MG per tablet, TAKE 1 TABLET DAILY, Disp: 90 tablet, Rfl: 3  •  MAGNESIUM GLUCONATE PO, Take 200 mg by mouth daily, Disp: , Rfl:   •  meclizine (ANTIVERT) 25 mg tablet, Take 1 tablet (25 mg total) by mouth daily as needed for dizziness or nausea, Disp: 14 tablet, Rfl: 0  •  Misc Natural Products (ELDERBERRY IMMUNE COMPLEX PO), Take by mouth, Disp: , Rfl:   •  Multiple Vitamin (MULTIVITAMIN) capsule, Take 1 capsule by mouth daily  , Disp: , Rfl:   •  nortriptyline (PAMELOR) 10 mg capsule, Take 10 mg by mouth daily at bedtime , Disp: , Rfl:   •  pantoprazole (PROTONIX) 40 mg tablet, TAKE 1 TABLET (40 MG TOTAL) BY MOUTH 2 (TWO) TIMES A DAY AS NEEDED (GERD), Disp: 180 tablet, Rfl: 1  •  valACYclovir (VALTREX) 500 mg tablet, TAKE 1 TABLET (500 MG TOTAL) BY MOUTH DAILY AS NEEDED FOR SHINGLES PREVENTION, Disp: 90 tablet, Rfl: 2  •  zinc gluconate 50 mg tablet, Take 50 mg by mouth daily, Disp: , Rfl:   •  Buprenorphine HCl 75 MCG FILM, Apply to cheek daily as needed (Patient not taking: Reported on 4/19/2023), Disp: , Rfl:   •  celecoxib (CeleBREX) 200 mg capsule, Take 200 mg by mouth 2 (two) times a day as needed, Disp: , Rfl:   •  docusate sodium (COLACE) 100 mg capsule, Take 1 capsule (100 mg total) by mouth 2 (two) times a day, Disp: 10 capsule, Rfl: 0      Physical Exam:  /80 (BP Location: Right arm, Patient Position: Sitting, Cuff Size: Large)   Pulse 100   Temp 97 8 °F (36 6 °C) (Temporal)   Ht 5' 4 17\" (1 63 m)   Wt 92 1 kg (203 lb)   SpO2 97%   BMI 34 66 kg/m²     Physical Exam  Constitutional:       General: She is not in acute distress  Appearance: She is well-developed  She is not ill-appearing  HENT:      Head: Normocephalic and atraumatic  Eyes:      General: No scleral icterus       Conjunctiva/sclera: Conjunctivae normal  " Cardiovascular:      Rate and Rhythm: Normal rate and regular rhythm  Heart sounds: Normal heart sounds  No murmur heard  Pulmonary:      Effort: Pulmonary effort is normal  No respiratory distress  Breath sounds: Normal breath sounds  Abdominal:      Palpations: Abdomen is soft  Tenderness: There is no abdominal tenderness  Musculoskeletal:         General: No tenderness  Normal range of motion  Cervical back: Normal range of motion and neck supple  Right lower leg: No edema  Left lower leg: No edema  Lymphadenopathy:      Cervical: No cervical adenopathy  Skin:     General: Skin is warm and dry  Neurological:      Mental Status: She is alert and oriented to person, place, and time  Cranial Nerves: No cranial nerve deficit  Psychiatric:         Mood and Affect: Mood normal          Behavior: Behavior normal            Labs:        Patient voiced understanding and agreement in the above discussion  Aware to contact our office with questions/symptoms in the interim  This note has been generated by voice recognition software system  Therefore, there may be spelling, grammar, and or syntax errors  Please contact if questions arise

## 2023-05-17 DIAGNOSIS — R79.89 ELEVATED PLATELET COUNT: Primary | ICD-10-CM

## 2023-06-21 ENCOUNTER — APPOINTMENT (OUTPATIENT)
Dept: LAB | Facility: CLINIC | Age: 68
End: 2023-06-21
Payer: COMMERCIAL

## 2023-06-21 DIAGNOSIS — D75.839 THROMBOCYTOSIS: Primary | ICD-10-CM

## 2023-06-21 DIAGNOSIS — D47.1 MYELOPROLIFERATIVE DISORDER (HCC): ICD-10-CM

## 2023-06-21 DIAGNOSIS — R79.89 ELEVATED PLATELET COUNT: ICD-10-CM

## 2023-06-21 LAB
BASOPHILS # BLD AUTO: 0.11 THOUSANDS/ÂΜL (ref 0–0.1)
BASOPHILS NFR BLD AUTO: 2 % (ref 0–1)
EOSINOPHIL # BLD AUTO: 0.18 THOUSAND/ÂΜL (ref 0–0.61)
EOSINOPHIL NFR BLD AUTO: 3 % (ref 0–6)
ERYTHROCYTE [DISTWIDTH] IN BLOOD BY AUTOMATED COUNT: 13 % (ref 11.6–15.1)
HCT VFR BLD AUTO: 40.4 % (ref 34.8–46.1)
HGB BLD-MCNC: 13.5 G/DL (ref 11.5–15.4)
IMM GRANULOCYTES # BLD AUTO: 0.02 THOUSAND/UL (ref 0–0.2)
IMM GRANULOCYTES NFR BLD AUTO: 0 % (ref 0–2)
LYMPHOCYTES # BLD AUTO: 2.73 THOUSANDS/ÂΜL (ref 0.6–4.47)
LYMPHOCYTES NFR BLD AUTO: 39 % (ref 14–44)
MCH RBC QN AUTO: 31.1 PG (ref 26.8–34.3)
MCHC RBC AUTO-ENTMCNC: 33.4 G/DL (ref 31.4–37.4)
MCV RBC AUTO: 93 FL (ref 82–98)
MONOCYTES # BLD AUTO: 0.57 THOUSAND/ÂΜL (ref 0.17–1.22)
MONOCYTES NFR BLD AUTO: 8 % (ref 4–12)
NEUTROPHILS # BLD AUTO: 3.37 THOUSANDS/ÂΜL (ref 1.85–7.62)
NEUTS SEG NFR BLD AUTO: 48 % (ref 43–75)
NRBC BLD AUTO-RTO: 0 /100 WBCS
PLATELET # BLD AUTO: 429 THOUSANDS/UL (ref 149–390)
PMV BLD AUTO: 10.6 FL (ref 8.9–12.7)
RBC # BLD AUTO: 4.34 MILLION/UL (ref 3.81–5.12)
WBC # BLD AUTO: 6.98 THOUSAND/UL (ref 4.31–10.16)

## 2023-06-21 PROCEDURE — 85025 COMPLETE CBC W/AUTO DIFF WBC: CPT

## 2023-06-21 PROCEDURE — 36415 COLL VENOUS BLD VENIPUNCTURE: CPT

## 2023-06-23 ENCOUNTER — HOSPITAL ENCOUNTER (OUTPATIENT)
Dept: MAMMOGRAPHY | Facility: MEDICAL CENTER | Age: 68
Discharge: HOME | End: 2023-06-23
Payer: COMMERCIAL

## 2023-06-23 ENCOUNTER — HOSPITAL ENCOUNTER (OUTPATIENT)
Dept: BONE DENSITY | Facility: MEDICAL CENTER | Age: 68
Discharge: HOME | End: 2023-06-23
Payer: COMMERCIAL

## 2023-06-23 VITALS — HEIGHT: 64 IN | BODY MASS INDEX: 34.66 KG/M2 | WEIGHT: 203 LBS

## 2023-06-23 DIAGNOSIS — Z78.0 POSTMENOPAUSAL: ICD-10-CM

## 2023-06-23 DIAGNOSIS — Z13.820 SCREENING FOR OSTEOPOROSIS: ICD-10-CM

## 2023-06-23 DIAGNOSIS — Z12.31 ENCOUNTER FOR SCREENING MAMMOGRAM FOR BREAST CANCER: ICD-10-CM

## 2023-06-23 PROCEDURE — 77063 BREAST TOMOSYNTHESIS BI: CPT

## 2023-06-23 PROCEDURE — 77067 SCR MAMMO BI INCL CAD: CPT

## 2023-06-23 PROCEDURE — 77080 DXA BONE DENSITY AXIAL: CPT

## 2023-08-14 ENCOUNTER — TELEPHONE (OUTPATIENT)
Dept: HEMATOLOGY ONCOLOGY | Facility: CLINIC | Age: 68
End: 2023-08-14

## 2023-08-14 NOTE — TELEPHONE ENCOUNTER
Appointment Schedule   Who are you speaking with? Patient   If it is not the patient, are they listed on an active communication consent form? N/A   Which provider is the appointment scheduled with? Suzanne Avitia PA-C   At which location is the appointment scheduled for? [facility]   When is the appointment scheduled? Please list date and time 8/30/23 1:30PM    What is the reason for this appointment? Follow Up   Did patient voice understanding of the details of this appointment? Yes   Was the no show policy reviewed with patient?  Yes

## 2023-08-28 ENCOUNTER — TELEPHONE (OUTPATIENT)
Dept: HEMATOLOGY ONCOLOGY | Facility: CLINIC | Age: 68
End: 2023-08-28

## 2023-08-28 ENCOUNTER — APPOINTMENT (OUTPATIENT)
Dept: LAB | Facility: MEDICAL CENTER | Age: 68
End: 2023-08-28
Payer: COMMERCIAL

## 2023-08-28 DIAGNOSIS — D75.839 THROMBOCYTOSIS: ICD-10-CM

## 2023-08-28 LAB
BASOPHILS # BLD AUTO: 0.1 THOUSANDS/ÂΜL (ref 0–0.1)
BASOPHILS NFR BLD AUTO: 1 % (ref 0–1)
CRP SERPL QL: 2 MG/L
EOSINOPHIL # BLD AUTO: 0.22 THOUSAND/ÂΜL (ref 0–0.61)
EOSINOPHIL NFR BLD AUTO: 3 % (ref 0–6)
ERYTHROCYTE [DISTWIDTH] IN BLOOD BY AUTOMATED COUNT: 11.9 % (ref 11.6–15.1)
ERYTHROCYTE [SEDIMENTATION RATE] IN BLOOD: 11 MM/HOUR (ref 0–29)
HCT VFR BLD AUTO: 41.5 % (ref 34.8–46.1)
HGB BLD-MCNC: 13.3 G/DL (ref 11.5–15.4)
IMM GRANULOCYTES # BLD AUTO: 0.03 THOUSAND/UL (ref 0–0.2)
IMM GRANULOCYTES NFR BLD AUTO: 0 % (ref 0–2)
LYMPHOCYTES # BLD AUTO: 3.13 THOUSANDS/ÂΜL (ref 0.6–4.47)
LYMPHOCYTES NFR BLD AUTO: 41 % (ref 14–44)
MCH RBC QN AUTO: 30.4 PG (ref 26.8–34.3)
MCHC RBC AUTO-ENTMCNC: 32 G/DL (ref 31.4–37.4)
MCV RBC AUTO: 95 FL (ref 82–98)
MONOCYTES # BLD AUTO: 0.77 THOUSAND/ÂΜL (ref 0.17–1.22)
MONOCYTES NFR BLD AUTO: 10 % (ref 4–12)
NEUTROPHILS # BLD AUTO: 3.44 THOUSANDS/ÂΜL (ref 1.85–7.62)
NEUTS SEG NFR BLD AUTO: 45 % (ref 43–75)
NRBC BLD AUTO-RTO: 0 /100 WBCS
PLATELET # BLD AUTO: 391 THOUSANDS/UL (ref 149–390)
PMV BLD AUTO: 10.9 FL (ref 8.9–12.7)
RBC # BLD AUTO: 4.38 MILLION/UL (ref 3.81–5.12)
WBC # BLD AUTO: 7.69 THOUSAND/UL (ref 4.31–10.16)

## 2023-08-28 PROCEDURE — 36415 COLL VENOUS BLD VENIPUNCTURE: CPT

## 2023-08-28 PROCEDURE — 86140 C-REACTIVE PROTEIN: CPT

## 2023-08-28 PROCEDURE — 85652 RBC SED RATE AUTOMATED: CPT

## 2023-08-28 NOTE — TELEPHONE ENCOUNTER
Nany from [address] lab calling to confirm which labs patient is needing to have drawn today. I confirmed with Karli Scott that the labs ordered June 2023 by Dior Nguyen (CRP, Sed Rate, and JAK2) are the labs that need to be drawn today. This information was relayed to Sandi Elam who voiced understanding.

## 2023-08-28 NOTE — TELEPHONE ENCOUNTER
Spoke to patient to have labs drawn before her appointment with Mando Basurto on Wednesday. She will be going to [facility] on [address] to get the labs drawn this afternoon.

## 2023-08-28 NOTE — TELEPHONE ENCOUNTER
Patient went to [id number] [facility] lab to have blood work drawn. The completed the c-reactive protein and sed rate but did not draw the cbc with diff. I spoke to Khai hatfield at the lab and they will add that test with the labs they juliette.

## 2023-08-30 ENCOUNTER — OFFICE VISIT (OUTPATIENT)
Dept: HEMATOLOGY ONCOLOGY | Facility: CLINIC | Age: 68
End: 2023-08-30
Payer: COMMERCIAL

## 2023-08-30 VITALS
SYSTOLIC BLOOD PRESSURE: 124 MMHG | TEMPERATURE: 97.7 F | OXYGEN SATURATION: 98 % | RESPIRATION RATE: 16 BRPM | WEIGHT: 197 LBS | HEIGHT: 64 IN | BODY MASS INDEX: 33.63 KG/M2 | HEART RATE: 99 BPM | DIASTOLIC BLOOD PRESSURE: 68 MMHG

## 2023-08-30 DIAGNOSIS — D75.839 THROMBOCYTOSIS: Primary | ICD-10-CM

## 2023-08-30 DIAGNOSIS — R71.8 OTHER ABNORMALITY OF RED BLOOD CELLS: ICD-10-CM

## 2023-08-30 PROCEDURE — 99214 OFFICE O/P EST MOD 30 MIN: CPT | Performed by: PHYSICIAN ASSISTANT

## 2023-08-30 NOTE — PROGRESS NOTES
Hematology/Oncology Outpatient Follow-up  Sasha Us y.o. female 1955 [id number]    Date:  8/30/2023      Assessment and Plan:  1. Thrombocytosis  58-year-old female presents for follow-up visit regarding thrombocytosis. See flowsheet below. This has fluctuated. Sed rate and CRP were normal.  I had requested JAK2 but this was not authorized for patient to complete. Reviewed the difficulties with this. Reviewed that I will place a new order to hopefully this will get authorized by the team.    Reviewed if she does not hear from someone within 2 weeks she is advised to call us regarding the status. Follow up in 1 month. - Iron Panel (Includes Ferritin, Iron Sat%, Iron, and TIBC); Future  - JAK2 V617F rfx CALR/MPL/E12-15; Future    HPI:  58-year-old female presents for consultation regarding elevated platelet count.     [age] she had melanoma removed on her thigh and upper arm. She follows [facility] 6 months up to date with this. Interval history: has a vasovagal response recently, when home after his  was in the hospital dx with cancer     ROS: Review of Systems   Constitutional: Negative for appetite change, chills, fatigue, fever and unexpected weight change. HENT: Negative for mouth sores and nosebleeds. Respiratory: Negative for cough and shortness of breath. Cardiovascular: Negative for chest pain, palpitations and leg swelling. Gastrointestinal: Negative for abdominal pain, blood in stool, constipation, diarrhea, nausea and vomiting. Genitourinary: Negative for difficulty urinating, dysuria and hematuria. Musculoskeletal: Negative for arthralgias. Skin: Negative. Neurological: Negative for dizziness, weakness, light-headedness, numbness and headaches. Hematological: Negative. Psychiatric/Behavioral: Negative.         Past Medical History:   Diagnosis Date   • Arthritis    • Asthma     childhood   • Chronic narcotic dependence ([address])    • Chronic pain disorder     low back- 2 back surgeries in past   • Cystocele with rectocele    • Diverticulosis    • GERD (gastroesophageal reflux disease)    • Hx MRSA infection     2014- left thigh after surgery for melanoma   • Hyperlipidemia    • Hypertension    • Malignant melanoma ([address])     left thigh/ right shoulder 2014   • Migraines    • Premature ventricular contraction     Last Assessed:  8/5/13   • Uterovaginal prolapse    • Wears partial dentures     upper       Past Surgical History:   Procedure Laterality Date   • BACK SURGERY      lumbar x 2   • BUNIONECTOMY Bilateral    • CHOLECYSTECTOMY     • COLONOSCOPY      Complete   • ESOPHAGOGASTRODUODENOSCOPY      Diagnostic   • INCISIONAL HERNIA REPAIR     • [place] COLPOPEXY VAGINAL EXTRAPERITONEAL APPROACH N/A 1/28/2020    Procedure: VE COLPOSUSPENSION (ENPLACE) with posterior repair;  Surgeon: Mariano Hamptno MD;  Location: [facility];  Service: UroGynecology          • [place] CYSTOURETHROSCOPY N/A 1/28/2020    Procedure: CYSTOSCOPY;  Surgeon: Mariano Hampton MD;  [facility];  Service: UroGynecology          • [place] SLING OPERATION STRESS INCONTINENCE N/A 1/28/2020    Procedure: SINGLE INCISION SLING;  Surgeon: Mariano Hampton MD;  Location[facility];  Service: UroGynecology          • SKIN CANCER EXCISION      melanoma left thigh / right shoulder       Social History     Socioeconomic History   • Marital status: /Civil Union     Spouse name: None   • Number of children: None   • Years of education: None   • Highest education level: None   Occupational History   • None   Tobacco Use   • Smoking status: Never     Passive exposure: Never   • Smokeless tobacco: Never   • Tobacco comments:     Per allscripts:  Never a smoker/Unknown if every smoked   Vaping Use   • Vaping Use: Never used   Substance and Sexual Activity   • Alcohol use: Yes     Comment: wine/ liquor, rarely   • Drug use: No   • Sexual activity: Not Currently     Partners: Male   Other Topics Concern   • None   Social History Narrative    Feels safe at home     Social Determinants of Health     Financial Resource Strain: Not on file   Food Insecurity: Not on file   Transportation Needs: Not on file   Physical Activity: Not on file   Stress: Not on file   Social Connections: Not on file   Intimate Partner Violence: Not on file   Housing Stability: Not on file       Family History   Problem Relation Age of Onset   • Diabetes type II Mother    • Stomach cancer Father 79   • Esophageal cancer Father 79   • No Known Problems Sister    • No Known Problems Sister    • No Known Problems Daughter    • No Known Problems Maternal Grandmother    • No Known Problems Maternal Grandfather    • No Known Problems Paternal Grandmother    • Prostate cancer Paternal Grandfather 80   • Lung cancer Paternal Uncle 79   • Brain cancer Paternal Uncle 61   • No Known Problems Maternal Aunt    • No Known Problems Maternal Aunt    • No Known Problems Maternal Aunt    • No Known Problems Maternal Aunt    • No Known Problems Paternal Aunt        Allergies   Allergen Reactions   • Lyrica [Pregabalin] Itching and Rash     Reaction Date: [id number];    • Buprenorphine Itching     If takes more than 3 days starts with itching   • Diazepam Other (See Comments)     gets hyper   • Hydrocodone Itching   • Oxycodone-Acetaminophen Itching   • Tramadol Itching   • Nucynta [Tapentadol] Itching         Current Outpatient Medications:   •  acetaminophen (TYLENOL) 500 mg tablet, Take 1 tablet (500 mg total) by mouth every 6 (six) hours as needed for mild pain, Disp: 30 tablet, Rfl: 0  •  albuterol (PROVENTIL HFA,VENTOLIN HFA) 90 mcg/act inhaler, Inhale 2 puffs every 4 (four) hours as needed for wheezing, Disp: 1 g, Rfl: 0  •  ascorbic acid (VITAMIN C) 250 mg tablet, Take 750 mg by mouth daily, Disp: , Rfl:   •  Cholecalciferol 1000 units CHEW, Chew daily , Disp: , Rfl:   •  Cyanocobalamin (Vitamin B-12) 1000 MCG SUBL, Place under the tongue, Disp: , Rfl:   •  cyclobenzaprine (FLEXERIL) 10 mg tablet, TAKE 1 TABLET (10 MG TOTAL) BY MOUTH 2 (TWO) TIMES A DAY AS NEEDED FOR MUSCLE SPASMS., Disp: , Rfl: 2  •  ezetimibe (ZETIA) 10 mg tablet, TAKE 1 TABLET BY MOUTH EVERY DAY, Disp: 90 tablet, Rfl: 1  •  lisinopril-hydrochlorothiazide (PRINZIDE,ZESTORETIC) 20-12.5 MG per tablet, TAKE 1 TABLET DAILY, Disp: 90 tablet, Rfl: 3  •  MAGNESIUM GLUCONATE PO, Take 200 mg by mouth daily, Disp: , Rfl:   •  meclizine (ANTIVERT) 25 mg tablet, Take 1 tablet (25 mg total) by mouth daily as needed for dizziness or nausea, Disp: 14 tablet, Rfl: 0  •  Misc Natural Products (ELDERBERRY IMMUNE COMPLEX PO), Take by mouth, Disp: , Rfl:   •  Multiple Vitamin (MULTIVITAMIN) capsule, Take 1 capsule by mouth daily  , Disp: , Rfl:   •  nortriptyline (PAMELOR) 10 mg capsule, Take 10 mg by mouth daily at bedtime , Disp: , Rfl:   •  pantoprazole (PROTONIX) 40 mg tablet, TAKE 1 TABLET (40 MG TOTAL) BY MOUTH 2 (TWO) TIMES A DAY AS NEEDED (GERD), Disp: 180 tablet, Rfl: 1  •  zinc gluconate 50 mg tablet, Take 50 mg by mouth daily, Disp: , Rfl:   •  valACYclovir (VALTREX) 500 mg tablet, TAKE 1 TABLET (500 MG TOTAL) BY MOUTH DAILY AS NEEDED FOR SHINGLES PREVENTION, Disp: 90 tablet, Rfl: 2    Physical Exam:  /68 (BP Location: Right arm, Patient Position: Sitting, Cuff Size: Adult)   Pulse 99   Temp 97.7 °F (36.5 °C) (Temporal)   Resp 16   Ht 5' 4" (1.626 m)   Wt 89.4 kg (197 lb)   SpO2 98%   BMI 33.81 kg/m²     Physical Exam  Vitals reviewed. Constitutional:       General: She is not in acute distress. Appearance: She is well-developed. She is not ill-appearing. HENT:      Head: Normocephalic and atraumatic. Eyes:      General: No scleral icterus. Conjunctiva/sclera: Conjunctivae normal.   Cardiovascular:      Rate and Rhythm: Normal rate and regular rhythm. Heart sounds: Normal heart sounds. No murmur heard.   Pulmonary:      Effort: Pulmonary effort is normal. No respiratory distress. Breath sounds: Normal breath sounds. Abdominal:      Palpations: Abdomen is soft. Tenderness: There is no abdominal tenderness. Musculoskeletal:         General: No tenderness. Normal range of motion. Cervical back: Normal range of motion and neck supple. Right lower leg: No edema. Left lower leg: No edema. Lymphadenopathy:      Cervical: No cervical adenopathy. Skin:     General: Skin is warm and dry. Neurological:      Mental Status: She is alert and oriented to person, place, and time. Cranial Nerves: No cranial nerve deficit. Psychiatric:         Mood and Affect: Mood normal.         Behavior: Behavior normal.       Labs:  Lab Results   Component Value Date    WBC 7.69 08/28/2023    HGB 13.3 08/28/2023    HCT 41.5 08/28/2023    MCV 95 08/28/2023     (H) 08/28/2023     I have spent 30 minutes with Patient  today in which greater than 50% of this time was spent in counseling/coordination of care regarding Diagnostic results, Impressions, Documenting in the medical record, Reviewing / ordering tests, medicine, procedures   and Obtaining or reviewing history  . Patient voiced understanding and agreement in the above discussion. Aware to contact our office with questions/symptoms in the interim. This note has been generated by voice recognition software system. Therefore, there may be spelling, grammar, and or syntax errors. Please contact if questions arise.

## 2023-08-31 ENCOUNTER — OFFICE VISIT (OUTPATIENT)
Dept: FAMILY MEDICINE CLINIC | Facility: CLINIC | Age: 68
End: 2023-08-31
Payer: COMMERCIAL

## 2023-08-31 VITALS
SYSTOLIC BLOOD PRESSURE: 142 MMHG | WEIGHT: 197.2 LBS | HEIGHT: 63 IN | HEART RATE: 85 BPM | BODY MASS INDEX: 34.94 KG/M2 | RESPIRATION RATE: 16 BRPM | DIASTOLIC BLOOD PRESSURE: 80 MMHG | OXYGEN SATURATION: 96 % | TEMPERATURE: 96.3 F

## 2023-08-31 DIAGNOSIS — F41.9 ANXIETY: ICD-10-CM

## 2023-08-31 DIAGNOSIS — R55 VASOVAGAL NEAR SYNCOPE: Primary | ICD-10-CM

## 2023-08-31 DIAGNOSIS — I10 PRIMARY HYPERTENSION: ICD-10-CM

## 2023-08-31 DIAGNOSIS — K21.9 GASTROESOPHAGEAL REFLUX DISEASE, UNSPECIFIED WHETHER ESOPHAGITIS PRESENT: ICD-10-CM

## 2023-08-31 DIAGNOSIS — E78.5 HYPERLIPIDEMIA, UNSPECIFIED HYPERLIPIDEMIA TYPE: ICD-10-CM

## 2023-08-31 DIAGNOSIS — E66.9 OBESITY (BMI 30-39.9): ICD-10-CM

## 2023-08-31 PROCEDURE — 99214 OFFICE O/P EST MOD 30 MIN: CPT | Performed by: FAMILY MEDICINE

## 2023-08-31 PROCEDURE — 93000 ELECTROCARDIOGRAM COMPLETE: CPT | Performed by: FAMILY MEDICINE

## 2023-08-31 RX ORDER — CITALOPRAM HYDROBROMIDE 10 MG/1
10 TABLET ORAL DAILY
Qty: 30 TABLET | Refills: 5 | Status: SHIPPED | OUTPATIENT
Start: 2023-08-31

## 2023-08-31 NOTE — PATIENT INSTRUCTIONS
Here for vasovagal symptoms and rec starting Citalopram 10 mg daily and rec labs and also EKG done today and will rec recheck in 1 month. Stay well hydrated. EKG done today - NSR no acute ST-T changes. Patient with a loose stool.

## 2023-08-31 NOTE — PROGRESS NOTES
Name: Stephanie Jiménez      : 1955      MRN: [id number]  Encounter Provider: Lamar Rangel DO  Encounter Date: 2023   Encounter department: [address] [place] [address]  Chief Complaint   Patient presents with   • GI Problem     episodes of feeling like she is going to pass out when using the bathroom, has occurred 4 x's in the past 9 months     Patient Instructions   Here for vasovagal symptoms and rec starting Citalopram 10 mg daily and rec labs and also EKG done today and will rec recheck in 1 month. Stay well hydrated. EKG done today - NSR no acute ST-T changes. Patient with a loose stool. Assessment & Plan     1. Vasovagal near syncope  -     Comprehensive metabolic panel; Future  -     CBC and differential; Future  -     CK; Future  -     High Sensitivity Troponin I Random; Future  -     Ambulatory Referral to Cardiology; Future  -     TSH, 3rd generation; Future  -     T4, free    2. Primary hypertension  -     Comprehensive metabolic panel; Future  -     Ambulatory Referral to Cardiology; Future  -     TSH, 3rd generation; Future  -     T4, free    3. Hyperlipidemia, unspecified hyperlipidemia type  -     Comprehensive metabolic panel; Future  -     Ambulatory Referral to Cardiology; Future  -     TSH, 3rd generation; Future  -     T4, free    4. Gastroesophageal reflux disease, unspecified whether esophagitis present  -     CBC and differential; Future    5. Obesity (BMI 30-39.9)  -     Comprehensive metabolic panel; Future  -     Ambulatory Referral to Cardiology; Future  -     TSH, 3rd generation; Future  -     T4, free    6. Anxiety  -     Comprehensive metabolic panel; Future  -     CBC and differential; Future  -     citalopram (CeleXA) 10 mg tablet; Take 1 tablet (10 mg total) by mouth daily           Subjective      GI Problem (episodes of feeling like she is going to pass out when using the bathroom, has occurred 4 x's in the past 9 months).  Increased stress and near syncopal episode recently. Patient was worried and called daughter. This lasted 45 minutes. Patient with vasovagal symptoms. Increased stress. Hx of HTN and hyperlipidemia and gerd. No blood in stool and no vomiting. GI Problem      Review of Systems   Constitutional: Negative. HENT: Negative. Eyes: Negative. Respiratory: Negative. Cardiovascular: Negative. Gastrointestinal: Negative. Endocrine: Negative. Genitourinary: Negative. Musculoskeletal: Negative. Skin: Negative. Allergic/Immunologic: Negative. Neurological: Negative. Hematological: Negative. Psychiatric/Behavioral: Negative. Current Outpatient Medications on File Prior to Visit   Medication Sig   • acetaminophen (TYLENOL) 500 mg tablet Take 1 tablet (500 mg total) by mouth every 6 (six) hours as needed for mild pain   • albuterol (PROVENTIL HFA,VENTOLIN HFA) 90 mcg/act inhaler Inhale 2 puffs every 4 (four) hours as needed for wheezing   • ascorbic acid (VITAMIN C) 250 mg tablet Take 750 mg by mouth daily   • Cholecalciferol 1000 units CHEW Chew daily    • Cyanocobalamin (Vitamin B-12) 1000 MCG SUBL Place under the tongue   • cyclobenzaprine (FLEXERIL) 10 mg tablet TAKE 1 TABLET (10 MG TOTAL) BY MOUTH 2 (TWO) TIMES A DAY AS NEEDED FOR MUSCLE SPASMS.    • ezetimibe (ZETIA) 10 mg tablet TAKE 1 TABLET BY MOUTH EVERY DAY   • lisinopril-hydrochlorothiazide (PRINZIDE,ZESTORETIC) 20-12.5 MG per tablet TAKE 1 TABLET DAILY   • MAGNESIUM GLUCONATE PO Take 200 mg by mouth daily   • meclizine (ANTIVERT) 25 mg tablet Take 1 tablet (25 mg total) by mouth daily as needed for dizziness or nausea   • Misc Natural Products (ELDERBERRY IMMUNE COMPLEX PO) Take by mouth   • Multiple Vitamin (MULTIVITAMIN) capsule Take 1 capsule by mouth daily     • nortriptyline (PAMELOR) 10 mg capsule Take 10 mg by mouth daily at bedtime    • pantoprazole (PROTONIX) 40 mg tablet TAKE 1 TABLET (40 MG TOTAL) BY MOUTH 2 (TWO) TIMES A DAY AS NEEDED (GERD)   • zinc gluconate 50 mg tablet Take 50 mg by mouth daily   • valACYclovir (VALTREX) 500 mg tablet TAKE 1 TABLET (500 MG TOTAL) BY MOUTH DAILY AS NEEDED FOR SHINGLES PREVENTION       Objective     /80 (BP Location: Left arm, Patient Position: Sitting, Cuff Size: Large)   Pulse 85   Temp (!) 96.3 °F (35.7 °C) (Temporal)   Resp 16   Ht 5' 3" (1.6 m)   Wt 89.4 kg (197 lb 3.2 oz)   SpO2 96%   BMI 34.93 kg/m²     Physical Exam  Constitutional:       Appearance: She is well-developed. HENT:      Head: Normocephalic and atraumatic. Right Ear: External ear normal.      Left Ear: External ear normal.      Nose: Nose normal.   Eyes:      Conjunctiva/sclera: Conjunctivae normal.      Pupils: Pupils are equal, round, and reactive to light. Cardiovascular:      Rate and Rhythm: Normal rate and regular rhythm. Heart sounds: Normal heart sounds. Pulmonary:      Effort: Pulmonary effort is normal.      Breath sounds: Normal breath sounds. Musculoskeletal:         General: Normal range of motion. Cervical back: Normal range of motion and neck supple. Skin:     General: Skin is warm and dry. Capillary Refill: Capillary refill takes less than 2 seconds. Neurological:      General: No focal deficit present. Mental Status: She is alert and oriented to person, place, and time. Mental status is at baseline. Deep Tendon Reflexes: Reflexes are normal and symmetric. Psychiatric:         Mood and Affect: Mood normal.         Behavior: Behavior normal.         Thought Content:  Thought content normal.         Judgment: Judgment normal.       Eddie Mojica,

## 2023-09-06 ENCOUNTER — TELEPHONE (OUTPATIENT)
Dept: HEMATOLOGY ONCOLOGY | Facility: CLINIC | Age: 68
End: 2023-09-06

## 2023-09-06 NOTE — TELEPHONE ENCOUNTER
Patient Call    Who are you speaking with? Patient    If it is not the patient, are they listed on an active communication consent form? N/A   What is the reason for this call? Patient calling in wanting to know if the Jak2 test was ever authorized by her insurance as she has not heard anything. Does this require a call back? Yes   If a call back is required, please list best call back number [phone]   If a call back is required, advise that a message will be forwarded to their care team and someone will return their call as soon as possible. Did you relay this information to the patient?  Yes

## 2023-09-14 ENCOUNTER — TELEPHONE (OUTPATIENT)
Dept: HEMATOLOGY ONCOLOGY | Facility: CLINIC | Age: 68
End: 2023-09-14

## 2023-09-14 NOTE — TELEPHONE ENCOUNTER
Appointment Change  Cancel, Reschedule, Change to Virtual      Who are you speaking with? Patient   If it is not the patient, is the caller listed on the communication consent form? N/A   Which provider is the appointment scheduled with? Olga Connelly PA-C   When was the original appointment scheduled? Please list date and time 9/27/23 @ 3:30pm   At which location is the appointment scheduled to take place? [facility]   Was the appointment rescheduled? Was the appointment changed from an in person visit to a virtual visit? If so, please list the details of the change. Yes 10/4/23 @ 1:30pm   What is the reason for the appointment change? Spouse is going to an appt at [address] transport scheduled? no   Does [facility] transport need to be scheduled for the new visit (if applicable) no   Does the patient need an infusion appointment rescheduled? no   Does the patient have an upcoming infusion appointment scheduled?  If so, when? no   Is the patient undergoing chemotherapy? no    Yes

## 2023-09-15 ENCOUNTER — APPOINTMENT (OUTPATIENT)
Dept: LAB | Facility: HOSPITAL | Age: 68
End: 2023-09-15
Payer: COMMERCIAL

## 2023-09-15 DIAGNOSIS — D75.839 THROMBOCYTOSIS: ICD-10-CM

## 2023-09-15 DIAGNOSIS — R55 VASOVAGAL NEAR SYNCOPE: ICD-10-CM

## 2023-09-15 DIAGNOSIS — K21.9 GASTROESOPHAGEAL REFLUX DISEASE, UNSPECIFIED WHETHER ESOPHAGITIS PRESENT: ICD-10-CM

## 2023-09-15 DIAGNOSIS — F41.9 ANXIETY: ICD-10-CM

## 2023-09-15 DIAGNOSIS — R71.8 OTHER ABNORMALITY OF RED BLOOD CELLS: ICD-10-CM

## 2023-09-15 DIAGNOSIS — E78.5 HYPERLIPIDEMIA, UNSPECIFIED HYPERLIPIDEMIA TYPE: ICD-10-CM

## 2023-09-15 DIAGNOSIS — I10 PRIMARY HYPERTENSION: ICD-10-CM

## 2023-09-15 DIAGNOSIS — E66.9 OBESITY (BMI 30-39.9): ICD-10-CM

## 2023-09-15 LAB
ALBUMIN SERPL BCP-MCNC: 4 G/DL (ref 3.5–5)
ALP SERPL-CCNC: 61 U/L (ref 34–104)
ALT SERPL W P-5'-P-CCNC: 21 U/L (ref 7–52)
ANION GAP SERPL CALCULATED.3IONS-SCNC: 3 MMOL/L
AST SERPL W P-5'-P-CCNC: 17 U/L (ref 13–39)
BASOPHILS # BLD AUTO: 0.1 THOUSANDS/ÂΜL (ref 0–0.1)
BASOPHILS NFR BLD AUTO: 2 % (ref 0–1)
BILIRUB SERPL-MCNC: 0.45 MG/DL (ref 0.2–1)
BUN SERPL-MCNC: 17 MG/DL (ref 5–25)
CALCIUM SERPL-MCNC: 9 MG/DL (ref 8.4–10.2)
CARDIAC TROPONIN I PNL SERPL HS: 3 NG/L (ref 8–18)
CHLORIDE SERPL-SCNC: 102 MMOL/L (ref 96–108)
CK SERPL-CCNC: 86 U/L (ref 26–192)
CO2 SERPL-SCNC: 31 MMOL/L (ref 21–32)
CREAT SERPL-MCNC: 0.75 MG/DL (ref 0.6–1.3)
EOSINOPHIL # BLD AUTO: 0.2 THOUSAND/ÂΜL (ref 0–0.61)
EOSINOPHIL NFR BLD AUTO: 3 % (ref 0–6)
ERYTHROCYTE [DISTWIDTH] IN BLOOD BY AUTOMATED COUNT: 12.3 % (ref 11.6–15.1)
FERRITIN SERPL-MCNC: 32 NG/ML (ref 11–307)
GFR SERPL CREATININE-BSD FRML MDRD: 82 ML/MIN/1.73SQ M
GLUCOSE P FAST SERPL-MCNC: 96 MG/DL (ref 65–99)
HCT VFR BLD AUTO: 41.8 % (ref 34.8–46.1)
HGB BLD-MCNC: 13.5 G/DL (ref 11.5–15.4)
IMM GRANULOCYTES # BLD AUTO: 0.03 THOUSAND/UL (ref 0–0.2)
IMM GRANULOCYTES NFR BLD AUTO: 0 % (ref 0–2)
IRON SATN MFR SERPL: 23 % (ref 15–50)
IRON SERPL-MCNC: 71 UG/DL (ref 50–212)
LYMPHOCYTES # BLD AUTO: 2.53 THOUSANDS/ÂΜL (ref 0.6–4.47)
LYMPHOCYTES NFR BLD AUTO: 38 % (ref 14–44)
MCH RBC QN AUTO: 30.2 PG (ref 26.8–34.3)
MCHC RBC AUTO-ENTMCNC: 32.3 G/DL (ref 31.4–37.4)
MCV RBC AUTO: 94 FL (ref 82–98)
MONOCYTES # BLD AUTO: 0.54 THOUSAND/ÂΜL (ref 0.17–1.22)
MONOCYTES NFR BLD AUTO: 8 % (ref 4–12)
NEUTROPHILS # BLD AUTO: 3.28 THOUSANDS/ÂΜL (ref 1.85–7.62)
NEUTS SEG NFR BLD AUTO: 49 % (ref 43–75)
NRBC BLD AUTO-RTO: 0 /100 WBCS
PLATELET # BLD AUTO: 378 THOUSANDS/UL (ref 149–390)
PMV BLD AUTO: 10.1 FL (ref 8.9–12.7)
POTASSIUM SERPL-SCNC: 4.3 MMOL/L (ref 3.5–5.3)
PROT SERPL-MCNC: 6.5 G/DL (ref 6.4–8.4)
RBC # BLD AUTO: 4.47 MILLION/UL (ref 3.81–5.12)
SODIUM SERPL-SCNC: 136 MMOL/L (ref 135–147)
T4 FREE SERPL-MCNC: 0.87 NG/DL (ref 0.61–1.12)
TIBC SERPL-MCNC: 315 UG/DL (ref 250–450)
TSH SERPL DL<=0.05 MIU/L-ACNC: 1.12 UIU/ML (ref 0.45–4.5)
UIBC SERPL-MCNC: 244 UG/DL (ref 155–355)
WBC # BLD AUTO: 6.68 THOUSAND/UL (ref 4.31–10.16)

## 2023-09-15 PROCEDURE — 82550 ASSAY OF CK (CPK): CPT

## 2023-09-15 PROCEDURE — 36415 COLL VENOUS BLD VENIPUNCTURE: CPT

## 2023-09-15 PROCEDURE — 80053 COMPREHEN METABOLIC PANEL: CPT

## 2023-09-15 PROCEDURE — 84443 ASSAY THYROID STIM HORMONE: CPT

## 2023-09-15 PROCEDURE — 84484 ASSAY OF TROPONIN QUANT: CPT

## 2023-09-15 PROCEDURE — 85025 COMPLETE CBC W/AUTO DIFF WBC: CPT

## 2023-09-15 PROCEDURE — 83540 ASSAY OF IRON: CPT

## 2023-09-15 PROCEDURE — 82728 ASSAY OF FERRITIN: CPT

## 2023-09-15 PROCEDURE — 83550 IRON BINDING TEST: CPT

## 2023-09-26 DIAGNOSIS — F41.9 ANXIETY: ICD-10-CM

## 2023-09-26 RX ORDER — CITALOPRAM HYDROBROMIDE 10 MG/1
10 TABLET ORAL DAILY
Qty: 90 TABLET | Refills: 2 | Status: SHIPPED | OUTPATIENT
Start: 2023-09-26

## 2023-09-27 LAB — JAK2 P.V617F BLD/T QL: NORMAL

## 2023-10-05 ENCOUNTER — TELEPHONE (OUTPATIENT)
Dept: HEMATOLOGY ONCOLOGY | Facility: CLINIC | Age: 68
End: 2023-10-05

## 2023-10-05 NOTE — TELEPHONE ENCOUNTER
Appointment Change  Cancel, Reschedule, Change to Virtual      Who are you speaking with? Patient   If it is not the patient, is the caller listed on the communication consent form? N/A   Which provider is the appointment scheduled with? Larissa Pallas, PA-C   When was the original appointment scheduled? Please list date and time 10/4/23 @ 130   At which location is the appointment scheduled to take place? [place]   Was the appointment rescheduled? Was the appointment changed from an in person visit to a virtual visit? If so, please list the details of the change. 11/8/23 @ 1030   What is the reason for the appointment change? Rescheduled        Was [facility] transport scheduled? No   Does [facility] need to be scheduled for the new visit (if applicable) No   Does the patient need an infusion appointment rescheduled? No   Does the patient have an upcoming infusion appointment scheduled? If so, when? No   Is the patient undergoing chemotherapy? No   For appointments cancelled with less than 24 hours:  Was the no-show policy reviewed?  N/A

## 2023-10-08 DIAGNOSIS — K21.9 GASTROESOPHAGEAL REFLUX DISEASE, UNSPECIFIED WHETHER ESOPHAGITIS PRESENT: ICD-10-CM

## 2023-10-09 RX ORDER — PANTOPRAZOLE SODIUM 40 MG/1
40 TABLET, DELAYED RELEASE ORAL 2 TIMES DAILY PRN
Qty: 180 TABLET | Refills: 1 | Status: SHIPPED | OUTPATIENT
Start: 2023-10-09

## 2023-10-19 ENCOUNTER — OFFICE VISIT (OUTPATIENT)
Dept: FAMILY MEDICINE CLINIC | Facility: CLINIC | Age: 68
End: 2023-10-19
Payer: MEDICARE

## 2023-10-19 VITALS
TEMPERATURE: 95.6 F | RESPIRATION RATE: 16 BRPM | HEART RATE: 91 BPM | OXYGEN SATURATION: 98 % | DIASTOLIC BLOOD PRESSURE: 80 MMHG | HEIGHT: 64 IN | BODY MASS INDEX: 34.01 KG/M2 | SYSTOLIC BLOOD PRESSURE: 132 MMHG | WEIGHT: 199.2 LBS

## 2023-10-19 DIAGNOSIS — K21.9 GASTROESOPHAGEAL REFLUX DISEASE, UNSPECIFIED WHETHER ESOPHAGITIS PRESENT: ICD-10-CM

## 2023-10-19 DIAGNOSIS — E66.9 OBESITY (BMI 30-39.9): ICD-10-CM

## 2023-10-19 DIAGNOSIS — F41.9 ANXIETY: Primary | ICD-10-CM

## 2023-10-19 DIAGNOSIS — I10 PRIMARY HYPERTENSION: ICD-10-CM

## 2023-10-19 DIAGNOSIS — Z13.220 SCREENING FOR HYPERLIPIDEMIA: ICD-10-CM

## 2023-10-19 DIAGNOSIS — E78.5 HYPERLIPIDEMIA, UNSPECIFIED HYPERLIPIDEMIA TYPE: ICD-10-CM

## 2023-10-19 PROCEDURE — 99214 OFFICE O/P EST MOD 30 MIN: CPT | Performed by: FAMILY MEDICINE

## 2023-10-19 NOTE — PROGRESS NOTES
Chief Complaint   Patient presents with    Follow-up     Follow up for BP      Patient Instructions   Anxiety stable and BP stable today. Continue same meds as directed. Call if any problems. Take gerd and cholesterol med as directed. Continue Citalopram 10 mg daily. Assessment/Plan:    No problem-specific Assessment & Plan notes found for this encounter. Diagnoses and all orders for this visit:    Anxiety    Primary hypertension    Obesity (BMI 30-39. 9)    Hyperlipidemia, unspecified hyperlipidemia type    Gastroesophageal reflux disease, unspecified whether esophagitis present          Subjective:      Patient ID: Anuja Colón is a 76 y.o. female. Follow-up (Follow up for BP ), DOING BETTER. NO CP OR SOB, OR HA. Anxiety stable and no vasovagal syncope. The following portions of the patient's history were reviewed and updated as appropriate: allergies, current medications, past family history, past medical history, past social history, past surgical history, and problem list.    Review of Systems   Constitutional: Negative. HENT: Negative. Eyes: Negative. Respiratory: Negative. Cardiovascular: Negative. Gastrointestinal: Negative. Endocrine: Negative. Genitourinary: Negative. Musculoskeletal: Negative. Skin: Negative. Allergic/Immunologic: Negative. Neurological: Negative. Hematological: Negative. Psychiatric/Behavioral:          Anxiety stable         Objective:      /80 (BP Location: Left arm, Patient Position: Sitting, Cuff Size: Large)   Pulse 91   Temp (!) 95.6 °F (35.3 °C) (Temporal)   Resp 16   Ht 5' 4" (1.626 m)   Wt 90.4 kg (199 lb 3.2 oz)   SpO2 98%   BMI 34.19 kg/m²          Physical Exam  Constitutional:       Appearance: She is well-developed. She is obese. HENT:      Head: Normocephalic and atraumatic.       Right Ear: External ear normal.      Left Ear: External ear normal.      Nose: Nose normal.   Eyes: Conjunctiva/sclera: Conjunctivae normal.      Pupils: Pupils are equal, round, and reactive to light. Cardiovascular:      Rate and Rhythm: Normal rate and regular rhythm. Pulses: Normal pulses. Heart sounds: Normal heart sounds. Pulmonary:      Effort: Pulmonary effort is normal.      Breath sounds: Normal breath sounds. Musculoskeletal:         General: Normal range of motion. Cervical back: Normal range of motion and neck supple. Skin:     General: Skin is warm and dry. Capillary Refill: Capillary refill takes less than 2 seconds. Neurological:      General: No focal deficit present. Mental Status: She is alert and oriented to person, place, and time. Mental status is at baseline. Deep Tendon Reflexes: Reflexes are normal and symmetric. Psychiatric:         Mood and Affect: Mood normal.         Behavior: Behavior normal.         Thought Content:  Thought content normal.         Judgment: Judgment normal.      Comments: Anxiety stable

## 2023-10-19 NOTE — PATIENT INSTRUCTIONS
Anxiety stable and BP stable today. Continue same meds as directed. Call if any problems. Take gerd and cholesterol med as directed. Continue Citalopram 10 mg daily.

## 2023-10-20 DIAGNOSIS — E78.5 HYPERLIPIDEMIA, UNSPECIFIED HYPERLIPIDEMIA TYPE: ICD-10-CM

## 2023-10-20 RX ORDER — EZETIMIBE 10 MG/1
TABLET ORAL
Qty: 90 TABLET | Refills: 1 | Status: SHIPPED | OUTPATIENT
Start: 2023-10-20

## 2023-10-23 DIAGNOSIS — R42 DIZZINESS: ICD-10-CM

## 2023-10-23 DIAGNOSIS — R51.9 NONINTRACTABLE HEADACHE, UNSPECIFIED CHRONICITY PATTERN, UNSPECIFIED HEADACHE TYPE: ICD-10-CM

## 2023-10-23 DIAGNOSIS — R55 VASOVAGAL NEAR SYNCOPE: Primary | ICD-10-CM

## 2023-11-08 ENCOUNTER — TELEMEDICINE (OUTPATIENT)
Dept: HEMATOLOGY ONCOLOGY | Facility: CLINIC | Age: 68
End: 2023-11-08

## 2023-11-08 DIAGNOSIS — D75.839 THROMBOCYTOSIS: Primary | ICD-10-CM

## 2023-11-08 NOTE — PROGRESS NOTES
Virtual Regular Visit    Verification of patient location:  [place]  Patient is located at Home in the following state in which I hold an active license [place]      Assessment/Plan:    Problem List Items Addressed This Visit    None           Reason for visit is No chief complaint on file. Encounter provider Lydia Ghosh PA-C    Provider located at [address] [place] [phone]      Recent Visits  No visits were found meeting these conditions. Showing recent visits within past 7 days and meeting all other requirements  Future Appointments  No visits were found meeting these conditions. Showing future appointments within next 150 days and meeting all other requirements       The patient was identified by name and date of birth. Tabitha Self was informed that this is a telemedicine visit and that the visit is being conducted through Telephone. My office door was closed. No one else was in the room. She acknowledged consent and understanding of privacy and security of the video platform. The patient has agreed to participate and understands they can discontinue the visit at any time. Patient is aware this is a billable service. Subjective  Tabitha Self is a 76 y.o. female  presents for follow up for thrombocytosis.      Past Medical History:   Diagnosis Date    Arthritis     Asthma     childhood    Chronic narcotic dependence ([address])     Chronic pain disorder     low back- 2 back surgeries in past    Cystocele with rectocele     Diverticulosis     GERD (gastroesophageal reflux disease)     Hx MRSA infection     2014- left thigh after surgery for melanoma    Hyperlipidemia     Hypertension     Malignant melanoma ([address])     left thigh/ right shoulder 2014    Migraines     Premature ventricular contraction     Last Assessed:  8/5/13    Uterovaginal prolapse     Wears partial dentures     upper       Past Surgical History: Procedure Laterality Date    BACK SURGERY      lumbar x 2    BUNIONECTOMY Bilateral     CHOLECYSTECTOMY      COLONOSCOPY      Complete    ESOPHAGOGASTRODUODENOSCOPY      Diagnostic    INCISIONAL HERNIA REPAIR      WA COLPOPEXY VAGINAL EXTRAPERITONEAL APPROACH N/A 1/28/2020    Procedure: VE COLPOSUSPENSION (ENPLACE) with posterior repair;  Surgeon: Nick Gibbons MD;  Location: [facility];  Service: UroGynecology           WA CYSTOURETHROSCOPY N/A 1/28/2020    Procedure: Josepha Appl;  Surgeon: Nick Gibbons MD;  Location: [facility];  Service: UroGynecology           WA SLING OPERATION STRESS INCONTINENCE N/A 1/28/2020    Procedure: SINGLE INCISION SLING;  Surgeon: Nick Gibbons MD;  Location: [facility];  Service: UroGynecology           SKIN CANCER EXCISION      melanoma left thigh / right shoulder       Current Outpatient Medications   Medication Sig Dispense Refill    acetaminophen (TYLENOL) 500 mg tablet Take 1 tablet (500 mg total) by mouth every 6 (six) hours as needed for mild pain 30 tablet 0    albuterol (PROVENTIL HFA,VENTOLIN HFA) 90 mcg/act inhaler Inhale 2 puffs every 4 (four) hours as needed for wheezing 1 g 0    ascorbic acid (VITAMIN C) 250 mg tablet Take 750 mg by mouth daily      Cholecalciferol 1000 units CHEW Chew daily       citalopram (CeleXA) 10 mg tablet TAKE 1 TABLET BY MOUTH EVERY DAY 90 tablet 2    Cyanocobalamin (Vitamin B-12) 1000 MCG SUBL Place under the tongue      cyclobenzaprine (FLEXERIL) 10 mg tablet TAKE 1 TABLET (10 MG TOTAL) BY MOUTH 2 (TWO) TIMES A DAY AS NEEDED FOR MUSCLE SPASMS.   2    ezetimibe (ZETIA) 10 mg tablet TAKE 1 TABLET BY MOUTH EVERY DAY 90 tablet 1    lisinopril-hydrochlorothiazide (PRINZIDE,ZESTORETIC) 20-12.5 MG per tablet TAKE 1 TABLET DAILY 90 tablet 3    MAGNESIUM GLUCONATE PO Take 200 mg by mouth daily      meclizine (ANTIVERT) 25 mg tablet Take 1 tablet (25 mg total) by mouth daily as needed for dizziness or nausea 14 tablet 0    Misc Natural Products (ELDERBERRY IMMUNE COMPLEX PO) Take by mouth      Multiple Vitamin (MULTIVITAMIN) capsule Take 1 capsule by mouth daily        nortriptyline (PAMELOR) 10 mg capsule Take 10 mg by mouth daily at bedtime  (Patient not taking: Reported on 10/19/2023)      pantoprazole (PROTONIX) 40 mg tablet TAKE 1 TABLET (40 MG TOTAL) BY MOUTH 2 (TWO) TIMES A DAY AS NEEDED (GERD) 180 tablet 1    valACYclovir (VALTREX) 500 mg tablet TAKE 1 TABLET (500 MG TOTAL) BY MOUTH DAILY AS NEEDED FOR SHINGLES PREVENTION 90 tablet 2    zinc gluconate 50 mg tablet Take 50 mg by mouth daily       No current facility-administered medications for this visit. Allergies   Allergen Reactions    Lyrica [Pregabalin] Itching and Rash     Reaction Date: [id number];     Buprenorphine Itching     If takes more than 3 days starts with itching    Diazepam Other (See Comments)     gets hyper    Hydrocodone Itching    Oxycodone-Acetaminophen Itching    Tramadol Itching    Nucynta [Tapentadol] Itching     Review of Systems   Constitutional:  Positive for fatigue (taking care of  with liver cancer). Respiratory:  Negative for cough and shortness of breath. Gastrointestinal:  Negative for abdominal pain, constipation, diarrhea, nausea and vomiting. Genitourinary:  Negative for difficulty urinating, dysuria and hematuria. Musculoskeletal:  Positive for arthralgias (aches in bones) and myalgias. Skin: Negative. Neurological:  Positive for dizziness (chronic x years). Negative for weakness, light-headedness and headaches. Hematological: Negative. 1. Thrombocytosis  MELBA-2 negative, iron panel is normal   States she does sometimes have medrol dose pack for back pain with success and possibly related to reactive thrombocytosis; ok to continue on medrol dose pack PRN  Platelets are now normal   Continue CBC-D monitoring with PCP  Follow up in future if having worsening and persistent thrombocytosis.     Visit Time  Total Visit Duration: 5 min

## 2023-11-12 NOTE — PROGRESS NOTES
Video visit after visits [facility]  [address]  Phone#  [phone]. Fax#  [phone]  *-*-*-*-*-*-*-*-*-*-*-*-*-*-*-*-*-*-*-*-*-*-*-*-*-*-*-*-*-*-*-*-*-*-*-*-*-*-*-*-*-*-*-*-*-*-*-*-*-*-*-*-*-*  ENCOUNTER DATE: 11/13/23 11:11 AM  PATIENT NAME: Shankar Mckinley   1955    [id number]  Age: 76 y.o. Sex: female  AUTHOR: Enrique Hester MD  PRIMARYCARE PHYSICIAN: Neville Meckel, DO  [place]: [place]  [address] [facility] [address] Neville Meckel, DO   *-*-*-*-*-*-*-*-*-*-*-*-*-*-*-*-*-*-*-*-*-*-*-*-*-*-*-*-*-*-*-*-*-*-*-*-*-*-*-*-*-*-*-*-*-*-*-*-*-*-*-*-*-*-  REASON FOR REFERRAL: Evaluation and management of syncope and hypertension. *-*-*-*-*-*-*-*-*-*-*-*-*-*-*-*-*-*-*-*-*-*-*-*-*-*-*-*-*-*-*-*-*-*-*-*-*-*-*-*-*-*-*-*-*-*-*-*-*-*-*-*-*-*-  CARDIOLOGY ASSESSMENT & PLAN:   Diagnosis ICD-10-CM Associated Orders   1. Vasovagal near syncope  R55 Ambulatory Referral to Cardiology     POCT ECG     AMB extended holter monitor     Echo complete w/ contrast if indicated      2. Orthostatic hypotension  I95.1       3. Primary hypertension  I10 Ambulatory Referral to Cardiology     POCT ECG     Echo complete w/ contrast if indicated      4. Hyperlipidemia, unspecified hyperlipidemia type  E78.5 Ambulatory Referral to Cardiology     POCT ECG      5. Obesity (BMI 30-39. 9)  E66.9 Ambulatory Referral to Cardiology      6. Recurrent syncope  R55 AMB extended holter monitor        Vasovagal near syncope  Ms. Shankar Mckinley has been experiencing recurrent near syncopal events. Description of symptoms suggest vasovagal syncope and orthostatic hypotension. Probability for arrhythmia related syncope or autonomic dysfunction related syncope is low but cannot be completely excluded. Her blood pressure is reasonably well controlled. She does not orthostatic response upon standing in the office.   Physical examination does not reveal signs of pulmonary or peripheral vascular congestion or significant valvular heart disease. Her thyroid function is noted to be normal.    Today we reviewed the\Pathophysiology and mechanisms of vasovagal syncope. -- I am advising her to continue current antihypertensive medications for now. I am providing her with a blood pressure tracking sheet with instructions to record blood pressures at different times especially if she is experiencing any symptoms. --  I advised her that we can take steps to avoid episodes coming. I am where  advising her to stay well-hydrated at all times and to tight knee-high socks whenever she is going out are not going to be standing for prolonged time. I am also advising her to avoid sudden turning bending getting up from lying down or sitting position. -- Given longstanding history of hypertension will arrange for echocardiogram to rule out hypertensive heart disease.  -- We will plan to see her back in 3 months time. Earlier if necessary. -- Dietary and medical compliance are reinforced. -- She is advised  to report any concerning symptoms such as chest pain, shortness of breath, decline in exercise tolerance or presyncope/syncope. PHYSICAL COUNTER MEASURES TO ABORT SUCH EVENTS OCCURRING:  * Leg crossing with muscle tensing---- Leg crossing with tensing of the leg, abdominal, and buttock muscles while lying down or, if necessary, while standing leg crossing with muscle tensing illustration leg crossing with muscle tensing illustration  * Squatting -- Lowering the body into a squatting position. Adjunctive lower-body and abdomen muscle tensing can be done during the squat and then on standing once symptoms have resolved. * Arm tensing -- Gripping opposing hands with fingers and pulling with arms in opposing directions with maximum force.   * Isometric handgrip-- Clenching fist at maximum contraction with or without an item in the hand  * Neck flexion-- Touching the chin to the chest and tightening the neck musculature. https://cpr.heart.org/en/resuscitation-science/first-aid-guidelines/first-aid/description-of-recommended-physical-counterpressure-maneuvers           *-*-*-*-*-*-*-*-*-*-*-*-*-*-*-*-*-*-*-*-*-*-*-*-*-*-*-*-*-*-*-*-*-*-*-*-*-*-*-*-*-*-*-*-*-*-*-*-*-*-*-*-*-*-  CURRENT ECG:  Results for orders placed or performed in visit on 11/13/23   POCT ECG    Narrative    Sinus rhythm with no significant ST-T wave abnormalities. No evidence of prior infarction, or chamber enlargement or hypertrophy. HR 96 bpm.  Normal axis and intervals. QTc borderline at 452 ms. ECG at PCPs office 8/31/2023: Normal sinus rhythm, HR 78 bpm, normal axis and intervals, no significant ST-T wave abnormality. No evidence of prior infarction or current ischemia. *-*-*-*-*-*-*-*-*-*-*-*-*-*-*-*-*-*-*-*-*-*-*-*-*-*-*-*-*-*-*-*-*-*-*-*-*-*-*-*-*-*-*-*-*-*-*-*-*-*-*-*-*-*-  HISTORY OF PRESENT ILLNESS:  Patient is a pleasant 25-year-old female with prior medical history significant for:  1. Primary hypertension  2. Dyslipidemia  3. Asthma  4. Chronic pain disorder  5. Degenerative joint disease status post back surgeries x2  6. History of malignant melanoma status post removal in 2014  7. Migraine headaches  8. History of PVCs  9. History of incisional hernia repair  10. Obesity  12. Vertigo    She has been experiencing recurrent near syncopal events for last several years however in the recent year or 2 the frequency of symptoms has increased. She describes the symptoms as sudden onset feeling of profound dryness of mouth and tingling of the perioral region followed by lightheadedness and dizziness and abdominal symptoms of nausea. She has not had any syncopal event but has had multiple near syncopal events. Symptoms occur randomly but never during her sleep.   symptoms last several minutes before the past.  She usually has some bowel movement eventually and sometimes diarrhea. Symptoms are occasionally associated with perspiration and 1 time she also felt heart racing. Other than this she has had no typical chest pain or unusual shortness of breath or  orthopnea or PND. She does have chronic vertigo and takes meclizine for that. Denies any ringing in the ears. She reports she is fairly active and helps out her  who is going through treatment for recently diagnosed liver cancer. She denies any previous history of coronary artery disease or congestive heart failure or arrhythmias in the past.  She is not diabetic. She has a remote history of malignant melanoma with removal in 2014. She remains in remission from that. She denies any family history of premature coronary artery disease or sudden cardiac death. She retired as a nurses aide and worked for 30 years at [employer]. Functional capacity status: Good   (Excellent- >10 METs; Good: (7-10 METs); Moderate (4-7 METs); Poor (<= 4 METs)    Any chronic stressors: None   (feeling of poor health, financial problems, and social isolation etc). Tobacco or alcohol dependence: She has never been a smoker herself but was exposed to secondhand smoke from family members. She drinks alcohol very rarely.     Current cardiac meds: Meclizine 25 mg as needed lisinopril HCTZ 20-12.5 mg daily ezetimibe 10 mg daily ascorbic acid    Blood work 9/15/2023:  Sodium 136 potassium 4.3 chloride 102 bicarb 31 BUN 17 creatinine 0.75 GFR 82  Normal liver function test  Lipid profile 4/19/2023 total cholesterol 190 triglyceride 98 HDL 62 calculated LDL 98  Normal iron studies  Normal CBC  TSH 1.4  Free T40.87  Hemoglobin A1c 5.6 in 2020    *-*-*-*-*-*-*-*-*-*-*-*-*-*-*-*-*-*-*-*-*-*-*-*-*-*-*-*-*-*-*-*-*-*-*-*-*-*-*-*-*-*-*-*-*-*-*-*-*-*-*-*-*-*  PAST MEDICAL HISTORY:  Past Medical History:   Diagnosis Date    Arthritis     Asthma     childhood    Chronic narcotic dependence (HCC)     Chronic pain disorder     low back- 2 back surgeries in past    Cystocele with rectocele     Diverticulosis     GERD (gastroesophageal reflux disease)     Hx MRSA infection     2014- left thigh after surgery for melanoma    Hyperlipidemia     Hypertension     Malignant melanoma ([address])     left thigh/ right shoulder 2014    Migraines     Premature ventricular contraction     Last Assessed:  8/5/13    Uterovaginal prolapse     Wears partial dentures     upper    PAST SURGICAL HISTORY:   Past Surgical History:   Procedure Laterality Date    BACK SURGERY      lumbar x 2    BUNIONECTOMY Bilateral     CHOLECYSTECTOMY      COLONOSCOPY      Complete    ESOPHAGOGASTRODUODENOSCOPY      Diagnostic    INCISIONAL HERNIA REPAIR      HI COLPOPEXY VAGINAL EXTRAPERITONEAL APPROACH N/A 1/28/2020    Procedure: VE COLPOSUSPENSION (ENPLACE) with posterior repair;  Surgeon: Anastasia Pan MD;  Location: [facility];  Service: UroGynecology           HI CYSTOURETHROSCOPY N/A 1/28/2020    Procedure: [place] Delay;  Surgeon: Anastasia Pan MD;  [facility];  Service: UroGynecology           HI SLING OPERATION STRESS INCONTINENCE N/A 1/28/2020    Procedure: SINGLE INCISION SLING;  Surgeon: Anastasia Pan MD;  Location[facility];  Service: UroGynecology           SKIN CANCER EXCISION      melanoma left thigh / right shoulder         FAMILY HISTORY:  Family History   Problem Relation Age of Onset    Diabetes type II Mother     Stomach cancer Father 79    Esophageal cancer Father 79    No Known Problems Sister     No Known Problems Sister     No Known Problems Daughter     No Known Problems Maternal Grandmother     No Known Problems Maternal Grandfather     No Known Problems Paternal Grandmother     Prostate cancer Paternal Grandfather 80    Lung cancer Paternal Uncle 79    Brain cancer Paternal Uncle 60    No Known Problems Maternal Aunt     No Known Problems Maternal Aunt     No Known Problems Maternal Aunt     No Known Problems Maternal Aunt     No Known Problems Paternal Aunt     SOCIAL HISTORY:  Social History     Tobacco Use   Smoking Status Never    Passive exposure: Never   Smokeless Tobacco Never   Tobacco Comments    Per allscripts:  Never a smoker/Unknown if every smoked      Social History     Substance and Sexual Activity   Alcohol Use Yes    Comment: wine/ liquor, rarely     Social History     Substance and Sexual Activity   Drug Use No    [id number]     *-*-*-*-*-*-*-*-*-*-*-*-*-*-*-*-*-*-*-*-*-*-*-*-*-*-*-*-*-*-*-*-*-*-*-*-*-*-*-*-*-*-*-*-*-*-*-*-*-*-*-*-*-*  ALLERGIES:  Allergies   Allergen Reactions    Lyrica [Pregabalin] Itching and Rash     Reaction Date: [id number];     Buprenorphine Itching     If takes more than 3 days starts with itching    Diazepam Other (See Comments)     gets hyper    Hydrocodone Itching    Oxycodone-Acetaminophen Itching    Tramadol Itching    Nucynta [Tapentadol] Itching    CURRENT SCHEDULED MEDICATIONS:    Current Outpatient Medications:     acetaminophen (TYLENOL) 500 mg tablet, Take 1 tablet (500 mg total) by mouth every 6 (six) hours as needed for mild pain, Disp: 30 tablet, Rfl: 0    albuterol (PROVENTIL HFA,VENTOLIN HFA) 90 mcg/act inhaler, Inhale 2 puffs every 4 (four) hours as needed for wheezing, Disp: 1 g, Rfl: 0    ascorbic acid (VITAMIN C) 250 mg tablet, Take 750 mg by mouth daily, Disp: , Rfl:     Cholecalciferol 1000 units CHEW, Chew daily , Disp: , Rfl:     Cyanocobalamin (Vitamin B-12) 1000 MCG SUBL, Place under the tongue, Disp: , Rfl:     cyclobenzaprine (FLEXERIL) 10 mg tablet, TAKE 1 TABLET (10 MG TOTAL) BY MOUTH 2 (TWO) TIMES A DAY AS NEEDED FOR MUSCLE SPASMS., Disp: , Rfl: 2    ezetimibe (ZETIA) 10 mg tablet, TAKE 1 TABLET BY MOUTH EVERY DAY, Disp: 90 tablet, Rfl: 1    lisinopril-hydrochlorothiazide (PRINZIDE,ZESTORETIC) 20-12.5 MG per tablet, TAKE 1 TABLET DAILY, Disp: 90 tablet, Rfl: 3    MAGNESIUM GLUCONATE PO, Take 200 mg by mouth daily, Disp: , Rfl:     meclizine (ANTIVERT) 25 mg tablet, Take 1 tablet (25 mg total) by mouth daily as needed for dizziness or nausea, Disp: 14 tablet, Rfl: 0    Misc Natural Products (ELDERBERRY IMMUNE COMPLEX PO), Take by mouth, Disp: , Rfl:     Multiple Vitamin (MULTIVITAMIN) capsule, Take 1 capsule by mouth daily  , Disp: , Rfl:     nortriptyline (PAMELOR) 10 mg capsule, Take 10 mg by mouth daily at bedtime, Disp: , Rfl:     pantoprazole (PROTONIX) 40 mg tablet, TAKE 1 TABLET (40 MG TOTAL) BY MOUTH 2 (TWO) TIMES A DAY AS NEEDED (GERD) (Patient taking differently: Take 40 mg by mouth 2 (two) times a day as needed (gerd) Patient states that she takes this every day), Disp: 180 tablet, Rfl: 1    valACYclovir (VALTREX) 500 mg tablet, TAKE 1 TABLET (500 MG TOTAL) BY MOUTH DAILY AS NEEDED FOR SHINGLES PREVENTION (Patient taking differently: Patient states that she takes this every day), Disp: 90 tablet, Rfl: 2    zinc gluconate 50 mg tablet, Take 50 mg by mouth daily, Disp: , Rfl:      *-*-*-*-*-*-*-*-*-*-*-*-*-*-*-*-*-*-*-*-*-*-*-*-*-*-*-*-*-*-*-*-*-*-*-*-*-*-*-*-*-*-*-*-*-*-*-*-*-*-*-*-*-*  REVIEW OF SYMPTOMS:    Positive for:  As noted above  Negative for: All remaining as reviewed below and in HPI.  SYSTEM SYMPTOMS REVIEWED:  General--weight change, fever, night sweats  Respiratoryl-- Wheezing, shortness of breath, cough, URI symptoms, sputum, blood  Cardiovascular--chest pain, syncope, dyspnea on exertion, edema, decline in exercise tolerance, claudication   Gastrointestinal--persistent vomiting, diarrhea, abdominal distention, blood in stool   Muscular or skeletal--joint pain or swelling   Neurologic--headaches, syncope, abnormal movement  Hematologic--history of easy bruising and bleeding   Endocrine--thyroid enlargement, heat or cold intolerance, polyuria   Psychiatric--anxiety, depression      *-*-*-*-*-*-*-*-*-*-*-*-*-*-*-*-*-*-*-*-*-*-*-*-*-*-*-*-*-*-*-*-*-*-*-*-*-*-*-*-*-*-*-*-*-*-*-*-*-*-*-*-*-*  CURRENT OUTPATIENT MEDICATIONS:     Current Outpatient Medications:     acetaminophen (TYLENOL) 500 mg tablet, Take 1 tablet (500 mg total) by mouth every 6 (six) hours as needed for mild pain, Disp: 30 tablet, Rfl: 0    albuterol (PROVENTIL HFA,VENTOLIN HFA) 90 mcg/act inhaler, Inhale 2 puffs every 4 (four) hours as needed for wheezing, Disp: 1 g, Rfl: 0    ascorbic acid (VITAMIN C) 250 mg tablet, Take 750 mg by mouth daily, Disp: , Rfl:     Cholecalciferol 1000 units CHEW, Chew daily , Disp: , Rfl:     Cyanocobalamin (Vitamin B-12) 1000 MCG SUBL, Place under the tongue, Disp: , Rfl:     cyclobenzaprine (FLEXERIL) 10 mg tablet, TAKE 1 TABLET (10 MG TOTAL) BY MOUTH 2 (TWO) TIMES A DAY AS NEEDED FOR MUSCLE SPASMS., Disp: , Rfl: 2    ezetimibe (ZETIA) 10 mg tablet, TAKE 1 TABLET BY MOUTH EVERY DAY, Disp: 90 tablet, Rfl: 1    lisinopril-hydrochlorothiazide (PRINZIDE,ZESTORETIC) 20-12.5 MG per tablet, TAKE 1 TABLET DAILY, Disp: 90 tablet, Rfl: 3    MAGNESIUM GLUCONATE PO, Take 200 mg by mouth daily, Disp: , Rfl:     meclizine (ANTIVERT) 25 mg tablet, Take 1 tablet (25 mg total) by mouth daily as needed for dizziness or nausea, Disp: 14 tablet, Rfl: 0    Misc Natural Products (ELDERBERRY IMMUNE COMPLEX PO), Take by mouth, Disp: , Rfl:     Multiple Vitamin (MULTIVITAMIN) capsule, Take 1 capsule by mouth daily  , Disp: , Rfl:     nortriptyline (PAMELOR) 10 mg capsule, Take 10 mg by mouth daily at bedtime, Disp: , Rfl:     pantoprazole (PROTONIX) 40 mg tablet, TAKE 1 TABLET (40 MG TOTAL) BY MOUTH 2 (TWO) TIMES A DAY AS NEEDED (GERD) (Patient taking differently: Take 40 mg by mouth 2 (two) times a day as needed (gerd) Patient states that she takes this every day), Disp: 180 tablet, Rfl: 1    valACYclovir (VALTREX) 500 mg tablet, TAKE 1 TABLET (500 MG TOTAL) BY MOUTH DAILY AS NEEDED FOR SHINGLES PREVENTION (Patient taking differently: Patient states that she takes this every day), Disp: 90 tablet, Rfl: 2    zinc gluconate 50 mg tablet, Take 50 mg by mouth daily, Disp: , Rfl: *-*-*-*-*-*-*-*-*-*-*-*-*-*-*-*-*-*-*-*-*-*-*-*-*-*-*-*-*-*-*-*-*-*-*-*-*-*-*-*-*-*-*-*-*-*-*-*-*-*-*-*-*-*  VITAL SIGNS:  Vitals:    11/13/23 1024 11/13/23 1102 11/13/23 1103 11/13/23 1105   BP: 130/80 111/72 104/69 98/67   BP Location:  Left arm Left arm Left arm   Patient Position:  Sitting Supine Standing   Cuff Size:  Adult Adult Adult   Pulse: 96 89 91 100   Weight: 89.4 kg (197 lb)      Height: 5' 4" (1.626 m)          BMI: Body mass index is 33.81 kg/m². WEIGHTS:   Wt Readings from Last 25 Encounters:   11/13/23 89.4 kg (197 lb)   10/19/23 90.4 kg (199 lb 3.2 oz)   08/31/23 89.4 kg (197 lb 3.2 oz)   08/30/23 89.4 kg (197 lb)   06/23/23 92.1 kg (203 lb)   05/15/23 92.1 kg (203 lb)   04/19/23 89.7 kg (197 lb 12.8 oz)   01/03/22 90.7 kg (200 lb)   11/18/21 88.9 kg (196 lb)   08/21/20 84.8 kg (187 lb)   01/28/20 89.4 kg (197 lb)   01/09/20 90.1 kg (198 lb 9.6 oz)   11/22/19 89.9 kg (198 lb 3.2 oz)   04/25/19 87.1 kg (192 lb)   04/23/19 87.5 kg (192 lb 14.4 oz)   07/30/18 90.3 kg (199 lb)   07/20/18 90.5 kg (199 lb 9.6 oz)   12/06/16 90.5 kg (199 lb 8 oz)   01/22/16 90.3 kg (199 lb)   11/12/14 86.6 kg (191 lb)   06/25/14 88.5 kg (195 lb 0.9 oz)   05/07/14 87.5 kg (193 lb)   08/05/13 87.1 kg (192 lb)   06/20/13 85.3 kg (188 lb)        *-*-*-*-*-*-*-*-*-*-*-*-*-*-*-*-*-*-*-*-*-*-*-*-*-*-*-*-*-*-*-*-*-*-*-*-*-*-*-*-*-*-*-*-*-*-*-*-*-*-*-*-*-*-  PHYSICAL EXAM:  General Appearance:    Alert, cooperative, no distress, appears stated age, increased BMI   Head, Eyes, ENT:    No obvious abnormality, moist mucous mebranes. Neck:   Supple, no carotid bruit or JVD   Back:     Symmetric, no curvature. Lungs:     Respirations unlabored. Clear to auscultation bilaterally,    Chest wall:    No tenderness or deformity   Heart:    Regular rate and rhythm, S1 and S2 normal, no murmur, rub  or gallop.    Abdomen:     Soft, non-tender, No obvious masses, or organomegaly   Extremities:   Extremities warm, no cyanosis or edema    Skin:   No venostatic changes in lower extremities. Normal skin color, texture, and turgor. No rashes or lesions     *-*-*-*-*-*-*-*-*-*-*-*-*-*-*-*-*-*-*-*-*-*-*-*-*-*-*-*-*-*-*-*-*-*-*-*-*-*-*-*-*-*-*-*-*-*-*-*-*-*-*-*-*-*-  LABORATORY DATA: I have personally reviewed the available laboratory data.         Potassium   Date Value Ref Range Status   09/15/2023 4.3 3.5 - 5.3 mmol/L Final   04/19/2023 4.9 3.5 - 5.3 mmol/L Final   11/16/2021 4.1 3.5 - 5.3 mmol/L Final   05/19/2014 4.4 3.6 - 5.0 mmol/L Final     Chloride   Date Value Ref Range Status   09/15/2023 102 96 - 108 mmol/L Final   04/19/2023 102 96 - 108 mmol/L Final   11/16/2021 103 100 - 108 mmol/L Final   05/19/2014 102 101 - 111 mmol/L Final     CO2   Date Value Ref Range Status   09/15/2023 31 21 - 32 mmol/L Final   04/19/2023 31 21 - 32 mmol/L Final   11/16/2021 29 21 - 32 mmol/L Final   05/19/2014 31 21 - 31 mmol/L Final     Anion Gap   Date Value Ref Range Status   05/19/2014 5 4 - 13 mmol/L Final     BUN   Date Value Ref Range Status   09/15/2023 17 5 - 25 mg/dL Final   04/19/2023 24 5 - 25 mg/dL Final   11/16/2021 18 5 - 25 mg/dL Final   05/19/2014 17 5 - 27 mg/dL Final     Creatinine   Date Value Ref Range Status   09/15/2023 0.75 0.60 - 1.30 mg/dL Final     Comment:     Standardized to IDMS reference method   04/19/2023 0.88 0.60 - 1.30 mg/dL Final     Comment:     Standardized to IDMS reference method   11/16/2021 0.86 0.60 - 1.30 mg/dL Final     Comment:     Standardized to IDMS reference method   05/19/2014 0.78 0.60 - 1.30 mg/dL Final     Comment:     Standardized to IDMS reference method     eGFR   Date Value Ref Range Status   09/15/2023 82 ml/min/1.73sq m Final   04/19/2023 68 ml/min/1.73sq m Final   11/16/2021 71 ml/min/1.73sq m Final     Glucose   Date Value Ref Range Status   05/19/2014 95 65 - 140 mg/dL Final     Comment:     If patient is fasting, the ADA then defines impaired fasting glucose as  >100 mg/dl and diabetes as  >or equal to 126 mg/dl. Calcium   Date Value Ref Range Status   09/15/2023 9.0 8.4 - 10.2 mg/dL Final   04/19/2023 9.0 8.3 - 10.1 mg/dL Final   11/16/2021 8.8 8.3 - 10.1 mg/dL Final   05/19/2014 8.9 8.3 - 10.1 mg/dL Final     AST   Date Value Ref Range Status   09/15/2023 17 13 - 39 U/L Final   04/19/2023 13 5 - 45 U/L Final     Comment:     Specimen collection should occur prior to Sulfasalazine administration due to the potential for falsely depressed results. 11/16/2021 20 5 - 45 U/L Final     Comment:     Specimen collection should occur prior to Sulfasalazine administration due to the potential for falsely depressed results. 05/19/2014 21 10 - 42 U/L Final   02/11/2014 20 10 - 42 U/L Final     ALT   Date Value Ref Range Status   09/15/2023 21 7 - 52 U/L Final     Comment:     Specimen collection should occur prior to Sulfasalazine administration due to the potential for falsely depressed results. 04/19/2023 45 12 - 78 U/L Final     Comment:     Specimen collection should occur prior to Sulfasalazine and/or Sulfapyridine administration due to the potential for falsely depressed results. 11/16/2021 39 12 - 78 U/L Final     Comment:     Specimen collection should occur prior to Sulfasalazine and/or Sulfapyridine administration due to the potential for falsely depressed results.     05/19/2014 30 6 - 78 U/L Final   02/11/2014 31 6 - 78 U/L Final     Alkaline Phosphatase   Date Value Ref Range Status   09/15/2023 61 34 - 104 U/L Final   04/19/2023 64 46 - 116 U/L Final   11/16/2021 69 46 - 116 U/L Final   05/19/2014 86 42 - 121 U/L Final   02/11/2014 78 42 - 121 U/L Final     Total Protein   Date Value Ref Range Status   05/19/2014 6.9 6.4 - 8.2 g/dL Final   02/11/2014 7.1 6.4 - 8.2 g/dL Final     Total Bilirubin   Date Value Ref Range Status   05/19/2014 0.4 0.2 - 1.0 mg/dL Final   02/11/2014 0.5 0.2 - 1.0 mg/dL Final     Magnesium   Date Value Ref Range Status   05/19/2014 2.3 1.6 - 2.5 mg/dL Final Comment:     The above 1 analytes were performed by Elisabeth Jain  [address] [place] [address]       WBC   Date Value Ref Range Status   09/15/2023 6.68 4.31 - 10.16 Thousand/uL Final   08/28/2023 7.69 4.31 - 10.16 Thousand/uL Final   06/21/2023 6.98 4.31 - 10.16 Thousand/uL Final   05/19/2014 5.55 4.31 - 10.16 Thousand/uL Final   02/11/2014 6.01 4.31 - 10.16 Thousand/uL Final     Comment:     New Reference Range     Hemoglobin   Date Value Ref Range Status   09/15/2023 13.5 11.5 - 15.4 g/dL Final   08/28/2023 13.3 11.5 - 15.4 g/dL Final   06/21/2023 13.5 11.5 - 15.4 g/dL Final   05/19/2014 13.7 11.5 - 15.4 g/dL Final   02/11/2014 14.3 11.5 - 15.4 g/dL Final     Comment:     New Reference Range     Platelets   Date Value Ref Range Status   09/15/2023 378 149 - 390 Thousands/uL Final   08/28/2023 391 (H) 149 - 390 Thousands/uL Final   06/21/2023 429 (H) 149 - 390 Thousands/uL Final   05/19/2014 390 149 - 390 Thousand/uL Final   02/11/2014 390 149 - 390 Thousand/uL Final     Comment:     New Reference Range  The above 10 analytes were performed by [facility]  [address] [place] [address]       No results found for: "PT", "PTT", "INR"  No results found for: "CKMB", "DIGOXIN"  No results found for: "TSH"  Cholesterol   Date Value Ref Range Status   05/19/2014 215 mg/dL Final     Comment:     CHOLESTEROL:       Desirable           <200 mg/dl       Borderline High     200-239 mg/dl       High                   >239 mg/dl  ____________________________________       HDL   Date Value Ref Range Status   05/19/2014 56 mg/dL Final     Comment:     HDL:       High       >59 mg/dl       Low        <41 mg/dl  ______________________________       HDL, Direct   Date Value Ref Range Status   04/19/2023 62 >=50 mg/dL Final     Comment:     Specimen collection should occur prior to Metamizole administration due to the potential for falsley depressed results.      Triglycerides   Date Value Ref Range Status   04/19/2023 98 See Comment mg/dL Final     Comment:     Triglyceride:     0-9Y            <75mg/dL     10Y-17Y         <90 mg/dL       >=18Y     Normal          <150 mg/dL     Borderline High 150-199 mg/dL     High            200-499 mg/dL        Very High       >499 mg/dL    Specimen collection should occur prior to N-Acetylcysteine or Metamizole administration due to the potential for falsely depressed results. 05/19/2014 66 mg/dL Final     Comment:     TRIGLYCERIDE:       Normal                 <150 mg/dl       Borderline High       150-199 mg/dl       High                  200-499 mg/dl       Very High             >499 mg/dl  _______________________________________        Hemoglobin A1C   Date Value Ref Range Status   05/19/2014 5.6 4.0 - 5.6 % Final     Comment:     5.7-6.4% impaired fasting glucose  >=6.5% diagnosis of diabetes  Falsely low levels are seen in conditions linked to short RBC life span  ï¿½ hemolytic anemia, and splenomegaly  Falsely elevated levels are seen in situations where there is an  increased production of RBC ï¿½ receipt of erythropoietin or blood  transfusions  Adopted from ADA-Clinical Practice Recommendations       No results found for: "BLOODCX", "SPUTUMCULTUR", "Bernadene Kapil", "Valdemar Begun", "WOUNDCULT", "BODYFLUIDCUL", "Rula Maidens", "INFLUAPCR", "INFLUBPCR", "RSVPCR", "Isa Kettle", "CDIFFTOXINB"    *-*-*-*-*-*-*-*-*-*-*-*-*-*-*-*-*-*-*-*-*-*-*-*-*-*-*-*-*-*-*-*-*-*-*-*-*-*-*-*-*-*-*-*-*-*-*-*-*-*-*-*-*-*-  RADIOLOGY RESULTS:  DXA bone density spine hip and pelvis    Result Date: 6/29/2023  Impression: 1. Based on the [facility] classification, this study is normal and the patient is considered at low risk for fracture. 2. A daily intake of calcium of at least 1200 mg and vitamin D, 800-1000 IU, as well as weight bearing and muscle strengthening exercise, fall prevention and avoidance of tobacco and excessive alcohol intake as basic preventive measures are recommended.  3. Repeat DXA scan on the same equipment in 18-24 months as clinically indicated. The 10 year risk of hip fracture is 0.7%, with the 10 year risk of major osteoporotic fracture being 8.0%, as calculated by the [facility] fracture risk assessment tool (FRAX). The current NOF guidelines recommend treating patients with FRAX 10 year risk score  of >3% for hip fracture and >20% for major osteoporotic fracture. WHO CLASSIFICATION: Normal (a T-score of -1.0 or higher) Low bone mineral density (a T-score of less than -1.0 but higher than -2.5) Osteoporosis (a T-score of -2.5 or less) Severe osteoporosis (a T-score of -2.5 or less with a fragility fracture) Thank you for allowing us the opportunity to participate in your patient care. The expanded DEXA report will no longer be arriving in your mail. If you desire to view the full report please contact [facility] or access the PACS system. Workstation performed: [id number]     Mammo screening bilateral w 3d & cad    Result Date: 6/26/2023  Impression: No mammographic evidence of malignancy. No significant change ASSESSMENT/BI-RADS CATEGORY: Left: 1 - Negative Right: 1 - Negative Overall: 1 - Negative RECOMMENDATION:      - Routine screening mammogram in 1 year for both breasts. Workstation ID: [id number]       *-*-*-*-*-*-*-*-*-*-*-*-*-*-*-*-*-*-*-*-*-*-*-*-*-*-*-*-*-*-*-*-*-*-*-*-*-*-*-*-*-*-*-*-*-*-*-*-*-*-*-*-*-*-  LAST ECHOCARDIOGRAM AND OTHER CARDIOLOGY RESULTS:  No results found for this or any previous visit. No results found for this or any previous visit. No results found for this or any previous visit. No results found for this or any previous visit.        *-*-*-*-*-*-*-*-*-*-*-*-*-*-*-*-*-*-*-*-*-*-*-*-*-*-*-*-*-*-*-*-*-*-*-*-*-*-*-*-*-*-*-*-*-*-*-*-*-*-*-*-*-*-  SIGNATURES:   @KGU@   Juventino Batista MD     CC:   DO Pascual Estrada DO

## 2023-11-13 ENCOUNTER — CONSULT (OUTPATIENT)
Dept: CARDIOLOGY CLINIC | Facility: CLINIC | Age: 68
End: 2023-11-13
Payer: COMMERCIAL

## 2023-11-13 VITALS
BODY MASS INDEX: 33.63 KG/M2 | WEIGHT: 197 LBS | HEART RATE: 100 BPM | SYSTOLIC BLOOD PRESSURE: 98 MMHG | DIASTOLIC BLOOD PRESSURE: 67 MMHG | HEIGHT: 64 IN

## 2023-11-13 DIAGNOSIS — R55 VASOVAGAL NEAR SYNCOPE: Primary | ICD-10-CM

## 2023-11-13 DIAGNOSIS — I95.1 ORTHOSTATIC HYPOTENSION: ICD-10-CM

## 2023-11-13 DIAGNOSIS — E66.9 OBESITY (BMI 30-39.9): ICD-10-CM

## 2023-11-13 DIAGNOSIS — R55 RECURRENT SYNCOPE: ICD-10-CM

## 2023-11-13 DIAGNOSIS — E78.5 HYPERLIPIDEMIA, UNSPECIFIED HYPERLIPIDEMIA TYPE: ICD-10-CM

## 2023-11-13 DIAGNOSIS — I10 PRIMARY HYPERTENSION: ICD-10-CM

## 2023-11-13 PROCEDURE — 99204 OFFICE O/P NEW MOD 45 MIN: CPT | Performed by: INTERNAL MEDICINE

## 2023-11-13 PROCEDURE — 93000 ELECTROCARDIOGRAM COMPLETE: CPT | Performed by: INTERNAL MEDICINE

## 2023-11-13 NOTE — ASSESSMENT & PLAN NOTE
Ms. Alberto Truong has been experiencing recurrent near syncopal events. Description of symptoms suggest vasovagal syncope and orthostatic hypotension. Probability for arrhythmia related syncope or autonomic dysfunction related syncope is low but cannot be completely excluded. Her blood pressure is reasonably well controlled. She does not orthostatic response upon standing in the office. Physical examination does not reveal signs of pulmonary or peripheral vascular congestion or significant valvular heart disease. Her thyroid function is noted to be normal.    Today we reviewed the\Pathophysiology and mechanisms of vasovagal syncope. -- I am advising her to continue current antihypertensive medications for now. I am providing her with a blood pressure tracking sheet with instructions to record blood pressures at different times especially if she is experiencing any symptoms. --  I advised her that we can take steps to avoid episodes coming. I am where  advising her to stay well-hydrated at all times and to tight knee-high socks whenever she is going out are not going to be standing for prolonged time. I am also advising her to avoid sudden turning bending getting up from lying down or sitting position. -- Given longstanding history of hypertension will arrange for echocardiogram to rule out hypertensive heart disease.  -- We will plan to see her back in 3 months time. Earlier if necessary. -- Dietary and medical compliance are reinforced. -- She is advised  to report any concerning symptoms such as chest pain, shortness of breath, decline in exercise tolerance or presyncope/syncope.       PHYSICAL COUNTER MEASURES TO ABORT SUCH EVENTS OCCURRING:  * Leg crossing with muscle tensing---- Leg crossing with tensing of the leg, abdominal, and buttock muscles while lying down or, if necessary, while standing leg crossing with muscle tensing illustration leg crossing with muscle tensing illustration  * Squatting -- Lowering the body into a squatting position. Adjunctive lower-body and abdomen muscle tensing can be done during the squat and then on standing once symptoms have resolved. * Arm tensing -- Gripping opposing hands with fingers and pulling with arms in opposing directions with maximum force. * Isometric handgrip-- Clenching fist at maximum contraction with or without an item in the hand  * Neck flexion-- Touching the chin to the chest and tightening the neck musculature.     https://[email or web address].heart.org/en/resuscitation-science/first-aid-guidelines/first-aid/description-of-recommended-physical-counterpressure-maneuvers

## 2023-11-13 NOTE — PATIENT INSTRUCTIONS
CARDIOLOGY ASSESSMENT & PLAN:   Diagnosis ICD-10-CM Associated Orders   1. Vasovagal near syncope  R55 Ambulatory Referral to Cardiology     POCT ECG     AMB extended holter monitor     Echo complete w/ contrast if indicated      2. Orthostatic hypotension  I95.1       3. Primary hypertension  I10 Ambulatory Referral to Cardiology     POCT ECG     Echo complete w/ contrast if indicated      4. Hyperlipidemia, unspecified hyperlipidemia type  E78.5 Ambulatory Referral to Cardiology     POCT ECG      5. Obesity (BMI 30-39. 9)  E66.9 Ambulatory Referral to Cardiology      6. Recurrent syncope  R55 AMB extended holter monitor        Vasovagal near syncope  Ms. Anuja Colón has been experiencing recurrent near syncopal events. Description of symptoms suggest vasovagal syncope and orthostatic hypotension. Probability for arrhythmia related syncope or autonomic dysfunction related syncope is low but cannot be completely excluded. Her blood pressure is reasonably well controlled. She does not orthostatic response upon standing in the office. Physical examination does not reveal signs of pulmonary or peripheral vascular congestion or significant valvular heart disease. Her thyroid function is noted to be normal.    Today we reviewed the\Pathophysiology and mechanisms of vasovagal syncope. -- I am advising her to continue current antihypertensive medications for now. I am providing her with a blood pressure tracking sheet with instructions to record blood pressures at different times especially if she is experiencing any symptoms. --  I advised her that we can take steps to avoid episodes coming. I am where  advising her to stay well-hydrated at all times and to tight knee-high socks whenever she is going out are not going to be standing for prolonged time. I am also advising her to avoid sudden turning bending getting up from lying down or sitting position.   -- Given longstanding history of hypertension will arrange for echocardiogram to rule out hypertensive heart disease.  -- We will plan to see her back in 3 months time. Earlier if necessary. -- Dietary and medical compliance are reinforced. -- She is advised  to report any concerning symptoms such as chest pain, shortness of breath, decline in exercise tolerance or presyncope/syncope. PHYSICAL COUNTER MEASURES TO ABORT SUCH EVENTS OCCURRING:  * Leg crossing with muscle tensing---- Leg crossing with tensing of the leg, abdominal, and buttock muscles while lying down or, if necessary, while standing leg crossing with muscle tensing illustration leg crossing with muscle tensing illustration  * Squatting -- Lowering the body into a squatting position. Adjunctive lower-body and abdomen muscle tensing can be done during the squat and then on standing once symptoms have resolved. * Arm tensing -- Gripping opposing hands with fingers and pulling with arms in opposing directions with maximum force. * Isometric handgrip-- Clenching fist at maximum contraction with or without an item in the hand  * Neck flexion-- Touching the chin to the chest and tightening the neck musculature.     https://cpr.heart.org/en/resuscitation-science/first-aid-guidelines/first-aid/description-of-recommended-physical-counterpressure-maneuvers

## 2023-11-26 DIAGNOSIS — B02.9 HERPES ZOSTER WITHOUT COMPLICATION: ICD-10-CM

## 2023-11-27 RX ORDER — VALACYCLOVIR HYDROCHLORIDE 500 MG/1
TABLET, FILM COATED ORAL
Qty: 90 TABLET | Refills: 2 | Status: SHIPPED | OUTPATIENT
Start: 2023-11-27 | End: 2024-02-25

## 2023-12-14 ENCOUNTER — CLINICAL SUPPORT (OUTPATIENT)
Dept: CARDIOLOGY CLINIC | Facility: CLINIC | Age: 68
End: 2023-12-14
Payer: COMMERCIAL

## 2023-12-14 DIAGNOSIS — R55 RECURRENT SYNCOPE: ICD-10-CM

## 2023-12-14 DIAGNOSIS — R55 VASOVAGAL NEAR SYNCOPE: ICD-10-CM

## 2023-12-14 PROCEDURE — 93248 EXT ECG>7D<15D REV&INTERPJ: CPT | Performed by: INTERNAL MEDICINE

## 2023-12-31 ENCOUNTER — DOCUMENTATION (OUTPATIENT)
Dept: CARDIOLOGY CLINIC | Facility: CLINIC | Age: 68
End: 2023-12-31

## 2024-01-01 NOTE — PROGRESS NOTES
Patient had a min HR of 57 bpm, max HR of 210 bpm, and avg HR of 95 bpm.   Predominant underlying rhythm was Sinus Rhythm.   11 Supraventricular Tachycardia runs occurred, the run with the fastest interval lasting 7 beats with a max rate of  210 bpm, the longest lasting 20 beats with an avg rate of 138 bpm.  Patient triggered  and diary entry events correlated with sinus rhythm and artifact.        There was no occurrence of nonsustained or sustained ventricular tachycardia or clinically significant pause events or Mobitz type II or higher degree AV block or atrial fibrillation.        IMPRESSION: Overall borderline extended Holter monitor findings with small amount of extra heartbeats in the form of nonsustained supraventricular tachycardia.  These are unlikely to cause loss of consciousness but may cause palpitations.  Additional therapy can be considered if patient is experiencing intermittent palpitations.    Enrique Hester MD

## 2024-01-10 DIAGNOSIS — I10 ESSENTIAL HYPERTENSION: ICD-10-CM

## 2024-01-10 RX ORDER — LISINOPRIL AND HYDROCHLOROTHIAZIDE 20; 12.5 MG/1; MG/1
TABLET ORAL
Qty: 90 TABLET | Refills: 3 | Status: SHIPPED | OUTPATIENT
Start: 2024-01-10

## 2024-02-20 ENCOUNTER — HOSPITAL ENCOUNTER (OUTPATIENT)
Dept: NON INVASIVE DIAGNOSTICS | Facility: HOSPITAL | Age: 69
Discharge: HOME | End: 2024-02-20
Attending: INTERNAL MEDICINE
Payer: COMMERCIAL

## 2024-02-20 VITALS
HEIGHT: 64 IN | HEART RATE: 86 BPM | WEIGHT: 197.09 LBS | BODY MASS INDEX: 33.65 KG/M2 | SYSTOLIC BLOOD PRESSURE: 137 MMHG | DIASTOLIC BLOOD PRESSURE: 65 MMHG

## 2024-02-20 DIAGNOSIS — R55 VASOVAGAL NEAR SYNCOPE: ICD-10-CM

## 2024-02-20 DIAGNOSIS — I10 PRIMARY HYPERTENSION: ICD-10-CM

## 2024-02-20 LAB
AORTIC ROOT: 3.2 CM
BSA FOR ECHO PROCEDURE: 1.94 M2
SL CV LV EF: 55

## 2024-02-20 PROCEDURE — C8929 TTE W OR WO FOL WCON,DOPPLER: HCPCS

## 2024-02-20 PROCEDURE — 93306 TTE W/DOPPLER COMPLETE: CPT | Performed by: INTERNAL MEDICINE

## 2024-02-20 RX ADMIN — PERFLUTREN 0.6 ML/MIN: 6.52 INJECTION, SUSPENSION INTRAVENOUS at 11:37

## 2024-03-14 ENCOUNTER — TELEPHONE (OUTPATIENT)
Dept: CARDIOLOGY CLINIC | Facility: CLINIC | Age: 69
End: 2024-03-14

## 2024-03-14 NOTE — TELEPHONE ENCOUNTER
Pt called and left message on voicemail to reschedule appt with Dr Hester. I called her back and left her a message on her voicemail.

## 2024-03-20 ENCOUNTER — OFFICE VISIT (OUTPATIENT)
Dept: CARDIOLOGY CLINIC | Facility: CLINIC | Age: 69
End: 2024-03-20
Payer: COMMERCIAL

## 2024-03-20 VITALS
SYSTOLIC BLOOD PRESSURE: 110 MMHG | BODY MASS INDEX: 34.16 KG/M2 | HEART RATE: 88 BPM | DIASTOLIC BLOOD PRESSURE: 70 MMHG | WEIGHT: 199 LBS

## 2024-03-20 DIAGNOSIS — I47.10 PAROXYSMAL SVT (SUPRAVENTRICULAR TACHYCARDIA): ICD-10-CM

## 2024-03-20 DIAGNOSIS — I95.1 ORTHOSTATIC HYPOTENSION: ICD-10-CM

## 2024-03-20 DIAGNOSIS — E78.2 MIXED HYPERLIPIDEMIA: ICD-10-CM

## 2024-03-20 DIAGNOSIS — I10 PRIMARY HYPERTENSION: ICD-10-CM

## 2024-03-20 DIAGNOSIS — R55 VASOVAGAL NEAR SYNCOPE: Primary | ICD-10-CM

## 2024-03-20 DIAGNOSIS — R00.2 PALPITATIONS: ICD-10-CM

## 2024-03-20 PROCEDURE — 99214 OFFICE O/P EST MOD 30 MIN: CPT | Performed by: INTERNAL MEDICINE

## 2024-03-20 PROCEDURE — G2211 COMPLEX E/M VISIT ADD ON: HCPCS | Performed by: INTERNAL MEDICINE

## 2024-03-20 RX ORDER — LISINOPRIL AND HYDROCHLOROTHIAZIDE 12.5; 1 MG/1; MG/1
1 TABLET ORAL DAILY
Qty: 90 TABLET | Refills: 3 | Status: SHIPPED | OUTPATIENT
Start: 2024-03-20

## 2024-03-20 RX ORDER — METOPROLOL SUCCINATE 25 MG/1
12.5 TABLET, EXTENDED RELEASE ORAL 2 TIMES DAILY
Qty: 90 TABLET | Refills: 3 | Status: SHIPPED | OUTPATIENT
Start: 2024-03-20

## 2024-03-20 NOTE — PATIENT INSTRUCTIONS
CARDIOLOGY ASSESSMENT & PLAN      Diagnosis ICD-10-CM Associated Orders   1. Vasovagal near syncope  R55       2. Paroxysmal SVT (supraventricular tachycardia)  I47.10 metoprolol succinate (TOPROL-XL) 25 mg 24 hr tablet      3. Primary hypertension  I10 lisinopril-hydrochlorothiazide (PRINZIDE,ZESTORETIC) 10-12.5 MG per tablet      4. Orthostatic hypotension  I95.1       5. Palpitations  R00.2 metoprolol succinate (TOPROL-XL) 25 mg 24 hr tablet      6. Mixed hyperlipidemia  E78.2          Vasovagal near syncope  Ms. Magda Bernabe overall feeling better with no recurrence of syncopal event but she does experience on and off transient palpitations and skipped beats.  Her extended Holter monitor last year was significant for no burden of supraventricular ectopy.  She did have bouts of nonsustained SVT events with longest event lasting for 20 beats.  Physical examination is overall unremarkable.  Blood pressure today is normal.    We are making following changes to her medical regimen:  -- I am discontinuing lisinopril HCTZ 20-12.5 mg daily and changing the dose to lisinopril HCTZ 10-12.5 mg daily.  -- I am beginning her on metoprolol succinate with instructions to take 12.5 mg, that is half a pill of 25 mg once daily in the morning.  -- She is advised to continue other medications and continue normal activities and to keep a diary of symptoms.  -- Percent of the visit she mentions occasional transient shortness of breath occurring at rest.  This symptom does not sound to be cardiac however if it persists then we will work it up.  She is advised to call us and let us know if this is a recurrent symptom especially if it is accompanied with chest tightness or profuse sweating or occurring with activity.  She is advised to call us and let us know.  -- We will plan for a 3-month follow-up visit however if she is feeling well in 3 months and has no recurrence of symptoms then follow-up can be postponed to 6 to 8 months.  --  Dietary and medical compliance are reinforced.  -- She is advised  to report any concerning symptoms such as chest pain, shortness of breath, decline in exercise tolerance or presyncope/syncope.

## 2024-03-20 NOTE — ASSESSMENT & PLAN NOTE
Ms. Magda Bernabe overall feeling better with no recurrence of syncopal event but she does experience on and off transient palpitations and skipped beats.  Her extended Holter monitor last year was significant for no burden of supraventricular ectopy.  She did have bouts of nonsustained SVT events with longest event lasting for 20 beats.  Physical examination is overall unremarkable.  Blood pressure today is normal.    We are making following changes to her medical regimen:  -- I am discontinuing lisinopril HCTZ 20-12.5 mg daily and changing the dose to lisinopril HCTZ 10-12.5 mg daily.  -- I am beginning her on metoprolol succinate with instructions to take 12.5 mg, that is half a pill of 25 mg once daily in the morning.  -- She is advised to continue other medications and continue normal activities and to keep a diary of symptoms.  -- Percent of the visit she mentions occasional transient shortness of breath occurring at rest.  This symptom does not sound to be cardiac however if it persists then we will work it up.  She is advised to call us and let us know if this is a recurrent symptom especially if it is accompanied with chest tightness or profuse sweating or occurring with activity.  She is advised to call us and let us know.  -- We will plan for a 3-month follow-up visit however if she is feeling well in 3 months and has no recurrence of symptoms then follow-up can be postponed to 6 to 8 months.  -- Dietary and medical compliance are reinforced.  -- She is advised  to report any concerning symptoms such as chest pain, shortness of breath, decline in exercise tolerance or presyncope/syncope.

## 2024-03-20 NOTE — PROGRESS NOTES
[facility]  [facility]  Primary Office: [address] [place] [id number]  Phone: [phone]; Fax: [phone]  *-*-*-*-*-*-*-*-*-*-*-*-*-*-*-*-*-*-*-*-*-*-*-*-*-*-*-*-*-*-*-*-*-*-*-*-*-*-*-*-*-*-*-*-*-*-*-*-*-*-*-*-*-*  ENCOUNTER DATE: 03/20/24 2:41 PM  PATIENT NAME: Magda Bernabe   1955    [id number]  AGE:68 y.o.      SEX: female  ENCOUNTER PROVIDER: Enrique Hester MD, A, [facility]  PRIMARY CARE PHYSICIAN: Adolph Apodaca DO    Diagnoses:  1. Recurrent syncope, suspected vasovagal syncope  2. Orthostatic hypotension  3. Primary hypertension  4. Dyslipidemia  5.  Degenerative joint disease status post back surgeries x2  6.  History of malignant melanoma status post removal in 2014  7.  Migraine headaches  8.  History of PVCs  9.  History of incisional hernia repair  10.  Obesity  12.  Vertigo  13. Chronic pain disorder  14.  Mild intermittent asthma    Echocardiogram 2/20/2024:  Normal left ventricular size and systolic function, EF 55%.  Grade 1 diastolic dysfunction.  Normal right ventricle size and function.  Normal left and right atrial cavity size.  Intact interatrial septum.  Normal aortic valve, no arctic valve stenosis or regurgitation.  Mild mitral valve annular calcification.  No mitral valve stenosis or regurgitation.  No tricuspid valve or pulmonic valve regurgitation.  Normal aortic root size.  Normal inferior vena cava.  No obvious pulmonary hypertension.  No pericardial effusion.    EXTENDED HOLTER MONITOR REVIEW COMPLETED.  Results noted in progress note dated 12/31/2023.  Overall borderline extended Holter monitor findings with small amount of extra heartbeats in the form of nonsustained supraventricular tachycardia.  These are unlikely to cause loss of consciousness but may cause palpitations.  Additional therapy can be considered if patient is experiencing intermittent palpitations.       CURRENT ECG   No results found for this visit on  03/20/24.         CARDIOLOGY ASSESSMENT & PLAN      Diagnosis ICD-10-CM Associated Orders   1. Vasovagal near syncope  R55       2. Paroxysmal SVT (supraventricular tachycardia)  I47.10 metoprolol succinate (TOPROL-XL) 25 mg 24 hr tablet      3. Primary hypertension  I10 lisinopril-hydrochlorothiazide (PRINZIDE,ZESTORETIC) 10-12.5 MG per tablet      4. Orthostatic hypotension  I95.1       5. Palpitations  R00.2 metoprolol succinate (TOPROL-XL) 25 mg 24 hr tablet      6. Mixed hyperlipidemia  E78.2          Vasovagal near syncope  Ms. Magda Bernabe overall feeling better with no recurrence of syncopal event but she does experience on and off transient palpitations and skipped beats.  Her extended Holter monitor last year was significant for no burden of supraventricular ectopy.  She did have bouts of nonsustained SVT events with longest event lasting for 20 beats.  Physical examination is overall unremarkable.  Blood pressure today is normal.    We are making following changes to her medical regimen:  -- I am discontinuing lisinopril HCTZ 20-12.5 mg daily and changing the dose to lisinopril HCTZ 10-12.5 mg daily.  -- I am beginning her on metoprolol succinate with instructions to take 12.5 mg, that is half a pill of 25 mg once daily in the morning.  -- She is advised to continue other medications and continue normal activities and to keep a diary of symptoms.  -- Percent of the visit she mentions occasional transient shortness of breath occurring at rest.  This symptom does not sound to be cardiac however if it persists then we will work it up.  She is advised to call us and let us know if this is a recurrent symptom especially if it is accompanied with chest tightness or profuse sweating or occurring with activity.  She is advised to call us and let us know.  -- We will plan for a 3-month follow-up visit however if she is feeling well in 3 months and has no recurrence of symptoms then follow-up can be postponed to 6  to 8 months.  -- Dietary and medical compliance are reinforced.  -- She is advised  to report any concerning symptoms such as chest pain, shortness of breath, decline in exercise tolerance or presyncope/syncope.         INTERVAL HISTORY, HISTORY OF PRESENT ILLNESS & COMPREHENSIVE VISIT INFORMATION     Magda Bernabe is here for follow-up regarding her cardiac comorbidities which include: History of recurrent near syncope and syncope, nonsustained supraventricular tachycardia, orthostatic hypotension.  She was last seen November 2023.  She was referred to undergo an extended Holter monitor and an echocardiogram which have since been completed.  She mentions that since last visit she has had no new diagnosis or hospitalizations or significant illnesses.  Unfortunately her  passed away towards the end of December and she is going through grieving process.  She has 3 children who are very supportive for her.  From a symptom perspective she mentions that she had 2-3 episodes where she felt like she was about to pass out but she was able to admit to syncope by using physical counter pressure measures shown to her at last visit.  She has had no true syncopal events.  Besides this she mentions that she has experienced occasional feeling of flickering or irregular heartbeat in her chest.  Symptoms have occurred when usually she is laying down in bed.  Symptoms are transient.  Symptoms have not woken up from sleep.  She does get episodes of vertigo and dizziness unrelated to her other symptoms and this symptom is relieved with the use of meclizine medication.  Other than that she is fairly active.  She denies any unusual headaches or chest pain or tightness or palpitations with normal activities or passing out episodes.  Denies any orthopnea PND or worsening pedal edema.     Functional capacity status: Good   (Excellent- >10 METs; Good: (7-10 METs); Moderate (4-7 METs); Poor (<= 4 METs)    Any chronic stressors: Good    (feeling of poor health, financial problems, and social isolation etc).    Tobacco or alcohol dependence: She does not smoke.  Reports drinking alcohol rarely.  She has 1 cup of tea in the morning and then has a cup to cup and a half of coffee during during the course of the day.    Current cardiac medications: Lisinopril HCTZ 20-12.5 mg daily ezetimibe 10 mg daily meclizine as needed for dizziness.  Multivitamin and other supplements.    Last recent comprehensive blood work available:   Blood work 9/15/2023: Sodium 136 potassium 4.3 chloride 102 bicarb 31 BUN 17 creatinine 0.75 GFR 82  Normal liver function test  Lipid profile 4/19/2023 total cholesterol 190 triglyceride 98 HDL 62 calculated LDL 98  Normal iron studies  Normal CBC  TSH 1.4  Free T40.87  Hemoglobin A1c 5.6 in 2020    The 10-year ASCVD risk score (Tyler SANTIZO, et al., 2019) is: 7.1%    Values used to calculate the score:      Age: 68 years      Sex: Female      Is Non- : No      Diabetic: No      Tobacco smoker: No      Systolic Blood Pressure: 110 mmHg      Is BP treated: Yes      HDL Cholesterol: 62 mg/dL      Total Cholesterol: 180 mg/dL  (Low risk: Less than <5%; borderline risk: ?5% to <7.5%; intermediate risk: ?7.5% to <20%; high risk:?20%)  Patient's ASCVD risk category: Borderline risk    REVIEW OF SYSTEMS   Positive for: As noted above in HPI  Negative for: All remaining as reviewed below and in HPI.    SYSTEM SYMPTOMS REVIEWED:  General--weight change, fever, night sweats  Respiratory--cough, wheezing, shortness of breath, sputum production  Cardiovascular--chest pain, syncope, dyspnea on exertion, edema, decline in exercise tolerance, claudication   Gastrointestinal--persistent vomiting, diarrhea, abdominal distention, blood in stool   Muscular or skeletal--joint pain or swelling   Neurologic--headaches, syncope, abnormal movement  Hematologic--history of easy bruising and bleeding   Endocrine--thyroid enlargement,  heat or cold intolerance, polyuria   Psychiatric--anxiety, depression     *-*-*-*-*-*-*-*-*-*-*-*-*-*-*-*-*-*-*-*-*-*-*-*-*-*-*-*-*-*-*-*-*-*-*-*-*-*-*-*-*-*-*-*-*-*-*-*-*-*-*-*-*-*-  VITAL SIGNS     CURRENT VITAL SIGNS:   Vitals:    03/20/24 1359   BP: 110/70   BP Location: Right arm   Patient Position: Sitting   Cuff Size: Adult   Pulse: 88   Weight: 90.3 kg (199 lb)       BMI: Body mass index is 34.16 kg/m².    WEIGHTS:   Wt Readings from Last 25 Encounters:   03/20/24 90.3 kg (199 lb)   02/20/24 89.4 kg (197 lb 1.5 oz)   11/13/23 89.4 kg (197 lb)   10/19/23 90.4 kg (199 lb 3.2 oz)   08/31/23 89.4 kg (197 lb 3.2 oz)   08/30/23 89.4 kg (197 lb)   06/23/23 92.1 kg (203 lb)   05/15/23 92.1 kg (203 lb)   04/19/23 89.7 kg (197 lb 12.8 oz)   01/03/22 90.7 kg (200 lb)   11/18/21 88.9 kg (196 lb)   08/21/20 84.8 kg (187 lb)   01/28/20 89.4 kg (197 lb)   01/09/20 90.1 kg (198 lb 9.6 oz)   11/22/19 89.9 kg (198 lb 3.2 oz)   04/25/19 87.1 kg (192 lb)   04/23/19 87.5 kg (192 lb 14.4 oz)   07/30/18 90.3 kg (199 lb)   07/20/18 90.5 kg (199 lb 9.6 oz)   12/06/16 90.5 kg (199 lb 8 oz)   01/22/16 90.3 kg (199 lb)   11/12/14 86.6 kg (191 lb)   06/25/14 88.5 kg (195 lb 0.9 oz)   05/07/14 87.5 kg (193 lb)   08/05/13 87.1 kg (192 lb)        *-*-*-*-*-*-*-*-*-*-*-*-*-*-*-*-*-*-*-*-*-*-*-*-*-*-*-*-*-*-*-*-*-*-*-*-*-*-*-*-*-*-*-*-*-*-*-*-*-*-*-*-*-*-  PHYSICAL EXAM     General Appearance:    Alert, cooperative, no distress, appears stated age   Head, Eyes, ENT:    No obvious abnormality, moist mucous mebranes.   Neck:   Supple, no carotid bruit. No JVD, lo obvious lymphadenoapthy   Back:     Symmetric, no curvature.   Lungs:     Respirations unlabored. Clear to auscultation bilaterally,    Chest wall:    No tenderness or deformity   Heart:    Regular rate and rhythm, S1 and S2 normal, no murmur, rub  or gallop.   Abdomen:     Soft, non-tender   Extremities:   Extremities warm, no cyanosis or edema    Skin:   No venostatic changes in  lower extremities. Normal skin color, texture, and turgor. No rashes or lesions   Neuro: Pt is alert and oriented time 3 with no gross motor deficits.   Psychiatric/Behavioral: Mood is normal. Behavior is normal.     *-*-*-*-*-*-*-*-*-*-*-*-*-*-*-*-*-*-*-*-*-*-*-*-*-*-*-*-*-*-*-*-*-*-*-*-*-*-*-*-*-*-*-*-*-*-*-*-*-*-*-*-*-*-  CURRENT MEDICATIONS LIST     Current Outpatient Medications:     acetaminophen (TYLENOL) 500 mg tablet, Take 1 tablet (500 mg total) by mouth every 6 (six) hours as needed for mild pain, Disp: 30 tablet, Rfl: 0    albuterol (PROVENTIL HFA,VENTOLIN HFA) 90 mcg/act inhaler, Inhale 2 puffs every 4 (four) hours as needed for wheezing, Disp: 1 g, Rfl: 0    ascorbic acid (VITAMIN C) 250 mg tablet, Take 750 mg by mouth daily, Disp: , Rfl:     Cholecalciferol 1000 units CHEW, Chew daily , Disp: , Rfl:     Cyanocobalamin (Vitamin B-12) 1000 MCG SUBL, Place under the tongue, Disp: , Rfl:     cyclobenzaprine (FLEXERIL) 10 mg tablet, TAKE 1 TABLET (10 MG TOTAL) BY MOUTH 2 (TWO) TIMES A DAY AS NEEDED FOR MUSCLE SPASMS., Disp: , Rfl: 2    ezetimibe (ZETIA) 10 mg tablet, TAKE 1 TABLET BY MOUTH EVERY DAY, Disp: 90 tablet, Rfl: 1    lisinopril-hydrochlorothiazide (PRINZIDE,ZESTORETIC) 10-12.5 MG per tablet, Take 1 tablet by mouth daily, Disp: 90 tablet, Rfl: 3    MAGNESIUM GLUCONATE PO, Take 200 mg by mouth daily, Disp: , Rfl:     meclizine (ANTIVERT) 25 mg tablet, Take 1 tablet (25 mg total) by mouth daily as needed for dizziness or nausea, Disp: 14 tablet, Rfl: 0    metoprolol succinate (TOPROL-XL) 25 mg 24 hr tablet, Take 0.5 tablets (12.5 mg total) by mouth 2 (two) times a day, Disp: 90 tablet, Rfl: 3    Misc Natural Products (ELDERBERRY IMMUNE COMPLEX PO), Take by mouth, Disp: , Rfl:     Multiple Vitamin (MULTIVITAMIN) capsule, Take 1 capsule by mouth daily  , Disp: , Rfl:     nortriptyline (PAMELOR) 10 mg capsule, Take 10 mg by mouth daily at bedtime, Disp: , Rfl:     pantoprazole (PROTONIX) 40 mg tablet,  TAKE 1 TABLET (40 MG TOTAL) BY MOUTH 2 (TWO) TIMES A DAY AS NEEDED (GERD) (Patient taking differently: Take 40 mg by mouth 2 (two) times a day as needed (gerd) Patient states that she takes this every day), Disp: 180 tablet, Rfl: 1    valACYclovir (VALTREX) 500 mg tablet, TAKE 1 TABLET (500 MG TOTAL) BY MOUTH DAILY AS NEEDED FOR SHINGLES PREVENTION, Disp: 90 tablet, Rfl: 2    zinc gluconate 50 mg tablet, Take 50 mg by mouth daily, Disp: , Rfl:        ALLERGIES     Allergies   Allergen Reactions    Lyrica [Pregabalin] Itching and Rash     Reaction Date: 09May2011;     Buprenorphine Itching     If takes more than 3 days starts with itching    Diazepam Other (See Comments)     gets hyper    Hydrocodone Itching    Oxycodone-Acetaminophen Itching    Tramadol Itching    Nucynta [Tapentadol] Itching       *-*-*-*-*-*-*-*-*-*-*-*-*-*-*-*-*-*-*-*-*-*-*-*-*-*-*-*-*-*-*-*-*-*-*-*-*-*-*-*-*-*-*-*-*-*-*-*-*-*-*-*-*-*-  LABORATORY DATA     Sodium   Date Value Ref Range Status   05/19/2014 137 135 - 145 mmol/L Final     Sodium   Date Value Ref Range Status   05/19/2014 137 135 - 145 mmol/L Final     Potassium   Date Value Ref Range Status   09/15/2023 4.3 3.5 - 5.3 mmol/L Final   04/19/2023 4.9 3.5 - 5.3 mmol/L Final   11/16/2021 4.1 3.5 - 5.3 mmol/L Final   05/19/2014 4.4 3.6 - 5.0 mmol/L Final     Chloride   Date Value Ref Range Status   09/15/2023 102 96 - 108 mmol/L Final   04/19/2023 102 96 - 108 mmol/L Final   11/16/2021 103 100 - 108 mmol/L Final   05/19/2014 102 101 - 111 mmol/L Final     CO2   Date Value Ref Range Status   09/15/2023 31 21 - 32 mmol/L Final   04/19/2023 31 21 - 32 mmol/L Final   11/16/2021 29 21 - 32 mmol/L Final   05/19/2014 31 21 - 31 mmol/L Final     Anion Gap   Date Value Ref Range Status   05/19/2014 5 4 - 13 mmol/L Final     BUN   Date Value Ref Range Status   09/15/2023 17 5 - 25 mg/dL Final   04/19/2023 24 5 - 25 mg/dL Final   11/16/2021 18 5 - 25 mg/dL Final   05/19/2014 17 5 - 27 mg/dL Final      Creatinine   Date Value Ref Range Status   09/15/2023 0.75 0.60 - 1.30 mg/dL Final     Comment:     Standardized to IDMS reference method   04/19/2023 0.88 0.60 - 1.30 mg/dL Final     Comment:     Standardized to IDMS reference method   11/16/2021 0.86 0.60 - 1.30 mg/dL Final     Comment:     Standardized to IDMS reference method   05/19/2014 0.78 0.60 - 1.30 mg/dL Final     Comment:     Standardized to IDMS reference method     eGFR   Date Value Ref Range Status   09/15/2023 82 ml/min/1.73sq m Final   04/19/2023 68 ml/min/1.73sq m Final   11/16/2021 71 ml/min/1.73sq m Final     Glucose   Date Value Ref Range Status   05/19/2014 95 65 - 140 mg/dL Final     Comment:     If patient is fasting, the ADA then defines impaired fasting glucose as  >100 mg/dl and diabetes as  >or equal to 126 mg/dl.       Calcium   Date Value Ref Range Status   09/15/2023 9.0 8.4 - 10.2 mg/dL Final   04/19/2023 9.0 8.3 - 10.1 mg/dL Final   11/16/2021 8.8 8.3 - 10.1 mg/dL Final   05/19/2014 8.9 8.3 - 10.1 mg/dL Final     AST   Date Value Ref Range Status   09/15/2023 17 13 - 39 U/L Final   04/19/2023 13 5 - 45 U/L Final     Comment:     Specimen collection should occur prior to Sulfasalazine administration due to the potential for falsely depressed results.    11/16/2021 20 5 - 45 U/L Final     Comment:     Specimen collection should occur prior to Sulfasalazine administration due to the potential for falsely depressed results.    05/19/2014 21 10 - 42 U/L Final   02/11/2014 20 10 - 42 U/L Final     ALT   Date Value Ref Range Status   09/15/2023 21 7 - 52 U/L Final     Comment:     Specimen collection should occur prior to Sulfasalazine administration due to the potential for falsely depressed results.    04/19/2023 45 12 - 78 U/L Final     Comment:     Specimen collection should occur prior to Sulfasalazine and/or Sulfapyridine administration due to the potential for falsely depressed results.    11/16/2021 39 12 - 78 U/L Final      "Comment:     Specimen collection should occur prior to Sulfasalazine and/or Sulfapyridine administration due to the potential for falsely depressed results.    05/19/2014 30 6 - 78 U/L Final   02/11/2014 31 6 - 78 U/L Final     Alkaline Phosphatase   Date Value Ref Range Status   09/15/2023 61 34 - 104 U/L Final   04/19/2023 64 46 - 116 U/L Final   11/16/2021 69 46 - 116 U/L Final   05/19/2014 86 42 - 121 U/L Final   02/11/2014 78 42 - 121 U/L Final     Total Protein   Date Value Ref Range Status   05/19/2014 6.9 6.4 - 8.2 g/dL Final   02/11/2014 7.1 6.4 - 8.2 g/dL Final     Total Bilirubin   Date Value Ref Range Status   05/19/2014 0.4 0.2 - 1.0 mg/dL Final   02/11/2014 0.5 0.2 - 1.0 mg/dL Final     Magnesium   Date Value Ref Range Status   05/19/2014 2.3 1.6 - 2.5 mg/dL Final     Comment:     The above 1 analytes were performed by [facility]  [address] [place] [id number]       WBC   Date Value Ref Range Status   09/15/2023 6.68 4.31 - 10.16 Thousand/uL Final   08/28/2023 7.69 4.31 - 10.16 Thousand/uL Final   06/21/2023 6.98 4.31 - 10.16 Thousand/uL Final   05/19/2014 5.55 4.31 - 10.16 Thousand/uL Final   02/11/2014 6.01 4.31 - 10.16 Thousand/uL Final     Comment:     New Reference Range     Hemoglobin   Date Value Ref Range Status   09/15/2023 13.5 11.5 - 15.4 g/dL Final   08/28/2023 13.3 11.5 - 15.4 g/dL Final   06/21/2023 13.5 11.5 - 15.4 g/dL Final   05/19/2014 13.7 11.5 - 15.4 g/dL Final   02/11/2014 14.3 11.5 - 15.4 g/dL Final     Comment:     New Reference Range     Platelets   Date Value Ref Range Status   09/15/2023 378 149 - 390 Thousands/uL Final   08/28/2023 391 (H) 149 - 390 Thousands/uL Final   06/21/2023 429 (H) 149 - 390 Thousands/uL Final   05/19/2014 390 149 - 390 Thousand/uL Final   02/11/2014 390 149 - 390 Thousand/uL Final     Comment:     New Reference Range  The above 10 analytes were performed by [facility]  [address] [place] [id number]       No results found for: \"PT\", \"PTT\", " "\"INR\"  No results found for: \"CK\", \"CKMB\", \"DIGOXIN\"  No results found for: \"TSH\"  Cholesterol   Date Value Ref Range Status   05/19/2014 215 mg/dL Final     Comment:     CHOLESTEROL:       Desirable           <200 mg/dl       Borderline High     200-239 mg/dl       High                   >239 mg/dl  ____________________________________        Hemoglobin A1C   Date Value Ref Range Status   05/19/2014 5.6 4.0 - 5.6 % Final     Comment:     5.7-6.4% impaired fasting glucose  >=6.5% diagnosis of diabetes  Falsely low levels are seen in conditions linked to short RBC life span  ï¿½ hemolytic anemia, and splenomegaly  Falsely elevated levels are seen in situations where there is an  increased production of RBC ï¿½ receipt of erythropoietin or blood  transfusions  Adopted from ADA-Clinical Practice Recommendations       No results found for: \"BLOODCX\", \"SPUTUMCULTUR\", \"GRAMSTAIN\", \"URINECX\", \"WOUNDCULT\", \"BODYFLUIDCUL\", \"MRSACULTURE\", \"INFLUAPCR\", \"INFLUBPCR\", \"RSVPCR\", \"LEGIONELLAUR\", \"CDIFFTOXINB\"    *-*-*-*-*-*-*-*-*-*-*-*-*-*-*-*-*-*-*-*-*-*-*-*-*-*-*-*-*-*-*-*-*-*-*-*-*-*-*-*-*-*-*-*-*-*-*-*-*-*-*-*-*-*-  PREVIOUS CARDIOLOGY & RADIOLOGY TEST RESULTS   I have personally reviewed pertinent results of cardiovascular tests noted below.    No results found for this or any previous visit.    No results found for this or any previous visit.    No results found for this or any previous visit.    No results found for this or any previous visit.    Echo complete w/ contrast if indicated    Technically difficult study.    Left Ventricle: Left ventricular cavity size is normal. Wall thickness   is normal. The left ventricular ejection fraction is 55% by visual   estimation. Systolic function is normal. There are no obvious high-grade   regional wall motion abnormalities.  There is limited endocardial   visualization despite the use of echo contrast. Diastolic function is   mildly abnormal, consistent with grade I (abnormal) " relaxation.    Right Ventricle: Right ventricular cavity size is normal. Systolic   function is normal.    Mitral Valve: There is mild annular calcification.    Tricuspid Valve: Pulmonary artery systolic pressures cannot be   estimated due to lack of tricuspid regurgitation jet.    There is no study for comparison.        *-*-*-*-*-*-*-*-*-*-*-*-*-*-*-*-*-*-*-*-*-*-*-*-*-*-*-*-*-*-*-*-*-*-*-*-*-*-*-*-*-*-*-*-*-*-*-*-*-*-*-*-*-*-  SIGNATURES:   @SIG@   Enrique Hester MD; A    *-*-*-*-*-*-*-*-*-*-*-*-*-*-*-*-*-*-*-*-*-*-*-*-*-*-*-*-*-*-*-*-*-*-*-*-*-*-*-*-*-*-*-*-*-*-*-*-*-*-*-*-*-*-  PAST MEDICAL HISTORY:  Past Medical History:   Diagnosis Date    Arthritis     Asthma     childhood    Chronic narcotic dependence (HCC)     Chronic pain disorder     low back- 2 back surgeries in past    Cystocele with rectocele     Diverticulosis     GERD (gastroesophageal reflux disease)     Hx MRSA infection     2014- left thigh after surgery for melanoma    Hyperlipidemia     Hypertension     Malignant melanoma (HCC)     left thigh/ right shoulder 2014    Migraines     Premature ventricular contraction     Last Assessed:  8/5/13    Uterovaginal prolapse     Wears partial dentures     upper    PAST SURGICAL HISTORY:  Past Surgical History:   Procedure Laterality Date    BACK SURGERY      lumbar x 2    BUNIONECTOMY Bilateral     CHOLECYSTECTOMY      COLONOSCOPY      Complete    ESOPHAGOGASTRODUODENOSCOPY      Diagnostic    INCISIONAL HERNIA REPAIR      MN COLPOPEXY VAGINAL EXTRAPERITONEAL APPROACH N/A 1/28/2020    Procedure: VE COLPOSUSPENSION (ENPLACE) with posterior repair;  Surgeon: Ez Enriquez MD;  Location: [facility];  Service: UroGynecology           MN CYSTOURETHROSCOPY N/A 1/28/2020    Procedure: CYSTOSCOPY;  Surgeon: Ez Enriquez MD;  Location: [facility];  Service: UroGynecology           MN SLING OPERATION STRESS INCONTINENCE N/A 1/28/2020    Procedure: SINGLE INCISION SLING;  Surgeon: Ez Enriquez MD;   Location: [facility];  Service: UroGynecology           SKIN CANCER EXCISION      melanoma left thigh / right shoulder         FAMILY HISTORY:  Family History   Problem Relation Age of Onset    Diabetes type II Mother     Stomach cancer Father 70    Esophageal cancer Father 70    No Known Problems Sister     No Known Problems Sister     No Known Problems Daughter     No Known Problems Maternal Grandmother     No Known Problems Maternal Grandfather     No Known Problems Paternal Grandmother     Prostate cancer Paternal Grandfather 80    Lung cancer Paternal Uncle 70    Brain cancer Paternal Uncle 60    No Known Problems Maternal Aunt     No Known Problems Maternal Aunt     No Known Problems Maternal Aunt     No Known Problems Maternal Aunt     No Known Problems Paternal Aunt     SOCIAL HISTORY:  Social History     Tobacco Use   Smoking Status Never    Passive exposure: Never   Smokeless Tobacco Never   Tobacco Comments    Per allscripts:  Never a smoker/Unknown if every smoked      Social History     Substance and Sexual Activity   Alcohol Use Yes    Comment: wine/ liquor, rarely     Social History     Substance and Sexual Activity   Drug Use No    [unfilled]     *-*-*-*-*-*-*-*-*-*-*-*-*-*-*-*-*-*-*-*-*-*-*-*-*-*-*-*-*-*-*-*-*-*-*-*-*-*-*-*-*-*-*-*-*-*-*-*-*-*-*-*-*-*  ALLERGIES:  Allergies   Allergen Reactions    Lyrica [Pregabalin] Itching and Rash     Reaction Date: 09May2011;     Buprenorphine Itching     If takes more than 3 days starts with itching    Diazepam Other (See Comments)     gets hyper    Hydrocodone Itching    Oxycodone-Acetaminophen Itching    Tramadol Itching    Nucynta [Tapentadol] Itching    CURRENT SCHEDULED MEDICATIONS:    Current Outpatient Medications:     acetaminophen (TYLENOL) 500 mg tablet, Take 1 tablet (500 mg total) by mouth every 6 (six) hours as needed for mild pain, Disp: 30 tablet, Rfl: 0    albuterol (PROVENTIL HFA,VENTOLIN HFA) 90 mcg/act inhaler, Inhale 2 puffs every 4 (four) hours  as needed for wheezing, Disp: 1 g, Rfl: 0    ascorbic acid (VITAMIN C) 250 mg tablet, Take 750 mg by mouth daily, Disp: , Rfl:     Cholecalciferol 1000 units CHEW, Chew daily , Disp: , Rfl:     Cyanocobalamin (Vitamin B-12) 1000 MCG SUBL, Place under the tongue, Disp: , Rfl:     cyclobenzaprine (FLEXERIL) 10 mg tablet, TAKE 1 TABLET (10 MG TOTAL) BY MOUTH 2 (TWO) TIMES A DAY AS NEEDED FOR MUSCLE SPASMS., Disp: , Rfl: 2    ezetimibe (ZETIA) 10 mg tablet, TAKE 1 TABLET BY MOUTH EVERY DAY, Disp: 90 tablet, Rfl: 1    lisinopril-hydrochlorothiazide (PRINZIDE,ZESTORETIC) 10-12.5 MG per tablet, Take 1 tablet by mouth daily, Disp: 90 tablet, Rfl: 3    MAGNESIUM GLUCONATE PO, Take 200 mg by mouth daily, Disp: , Rfl:     meclizine (ANTIVERT) 25 mg tablet, Take 1 tablet (25 mg total) by mouth daily as needed for dizziness or nausea, Disp: 14 tablet, Rfl: 0    metoprolol succinate (TOPROL-XL) 25 mg 24 hr tablet, Take 0.5 tablets (12.5 mg total) by mouth 2 (two) times a day, Disp: 90 tablet, Rfl: 3    Misc Natural Products (ELDERBERRY IMMUNE COMPLEX PO), Take by mouth, Disp: , Rfl:     Multiple Vitamin (MULTIVITAMIN) capsule, Take 1 capsule by mouth daily  , Disp: , Rfl:     nortriptyline (PAMELOR) 10 mg capsule, Take 10 mg by mouth daily at bedtime, Disp: , Rfl:     pantoprazole (PROTONIX) 40 mg tablet, TAKE 1 TABLET (40 MG TOTAL) BY MOUTH 2 (TWO) TIMES A DAY AS NEEDED (GERD) (Patient taking differently: Take 40 mg by mouth 2 (two) times a day as needed (gerd) Patient states that she takes this every day), Disp: 180 tablet, Rfl: 1    valACYclovir (VALTREX) 500 mg tablet, TAKE 1 TABLET (500 MG TOTAL) BY MOUTH DAILY AS NEEDED FOR SHINGLES PREVENTION, Disp: 90 tablet, Rfl: 2    zinc gluconate 50 mg tablet, Take 50 mg by mouth daily, Disp: , Rfl:      *-*-*-*-*-*-*-*-*-*-*-*-*-*-*-*-*-*-*-*-*-*-*-*-*-*-*-*-*-*-*-*-*-*-*-*-*-*-*-*-*-*-*-*-*-*-*-*-*-*-*-*-*-*

## 2024-04-17 ENCOUNTER — RA CDI HCC (OUTPATIENT)
Dept: OTHER | Facility: HOSPITAL | Age: 69
End: 2024-04-17

## 2024-04-20 DIAGNOSIS — E78.5 HYPERLIPIDEMIA, UNSPECIFIED HYPERLIPIDEMIA TYPE: ICD-10-CM

## 2024-04-22 RX ORDER — EZETIMIBE 10 MG/1
TABLET ORAL
Qty: 30 TABLET | Refills: 0 | Status: SHIPPED | OUTPATIENT
Start: 2024-04-22

## 2024-04-23 ENCOUNTER — OFFICE VISIT (OUTPATIENT)
Dept: FAMILY MEDICINE CLINIC | Facility: CLINIC | Age: 69
End: 2024-04-23
Payer: COMMERCIAL

## 2024-04-23 VITALS
DIASTOLIC BLOOD PRESSURE: 70 MMHG | HEIGHT: 64 IN | OXYGEN SATURATION: 96 % | BODY MASS INDEX: 33.05 KG/M2 | WEIGHT: 193.6 LBS | HEART RATE: 90 BPM | TEMPERATURE: 96.4 F | SYSTOLIC BLOOD PRESSURE: 120 MMHG | RESPIRATION RATE: 16 BRPM

## 2024-04-23 DIAGNOSIS — E66.9 OBESITY (BMI 30-39.9): ICD-10-CM

## 2024-04-23 DIAGNOSIS — I10 PRIMARY HYPERTENSION: ICD-10-CM

## 2024-04-23 DIAGNOSIS — Z00.00 HEALTH CARE MAINTENANCE: ICD-10-CM

## 2024-04-23 DIAGNOSIS — Z00.00 MEDICARE ANNUAL WELLNESS VISIT, SUBSEQUENT: Primary | ICD-10-CM

## 2024-04-23 DIAGNOSIS — K21.9 GASTROESOPHAGEAL REFLUX DISEASE, UNSPECIFIED WHETHER ESOPHAGITIS PRESENT: ICD-10-CM

## 2024-04-23 DIAGNOSIS — E78.5 HYPERLIPIDEMIA, UNSPECIFIED HYPERLIPIDEMIA TYPE: ICD-10-CM

## 2024-04-23 PROCEDURE — 99397 PER PM REEVAL EST PAT 65+ YR: CPT | Performed by: FAMILY MEDICINE

## 2024-04-23 NOTE — PATIENT INSTRUCTIONS
Medicare Preventive Visit Patient Instructions  Thank you for completing your Welcome to Medicare Visit or Medicare Annual Wellness Visit today. Your next wellness visit will be due in one year (4/24/2025).  The screening/preventive services that you may require over the next 5-10 years are detailed below. Some tests may not apply to you based off risk factors and/or age. Screening tests ordered at today's visit but not completed yet may show as past due. Also, please note that scanned in results may not display below.  Preventive Screenings:  Service Recommendations Previous Testing/Comments   Colorectal Cancer Screening  * Colonoscopy    * Fecal Occult Blood Test (FOBT)/Fecal Immunochemical Test (FIT)  * Fecal DNA/Cologuard Test  * Flexible Sigmoidoscopy Age: 45-75 years old   Colonoscopy: every 10 years (may be performed more frequently if at higher risk)  OR  FOBT/FIT: every 1 year  OR  Cologuard: every 3 years  OR  Sigmoidoscopy: every 5 years  Screening may be recommended earlier than age 45 if at higher risk for colorectal cancer. Also, an individualized decision between you and your healthcare provider will decide whether screening between the ages of 76-85 would be appropriate. Colonoscopy: Not on file  FOBT/FIT: Not on file  Cologuard: 05/02/2023  Sigmoidoscopy: Not on file    Screening Current     Breast Cancer Screening Age: 40+ years old  Frequency: every 1-2 years  Not required if history of left and right mastectomy Mammogram: 06/23/2023    Screening Current   Cervical Cancer Screening Between the ages of 21-29, pap smear recommended once every 3 years.   Between the ages of 30-65, can perform pap smear with HPV co-testing every 5 years.   Recommendations may differ for women with a history of total hysterectomy, cervical cancer, or abnormal pap smears in past. Pap Smear: 04/23/2019    Screening Not Indicated   Hepatitis C Screening Once for adults born between 1945 and 1965  More frequently in  patients at high risk for Hepatitis C Hep C Antibody: 07/27/2018    Screening Current   Diabetes Screening 1-2 times per year if you're at risk for diabetes or have pre-diabetes Fasting glucose: 96 mg/dL (9/15/2023)  A1C: No results in last 5 years (No results in last 5 years)  Screening Current   Cholesterol Screening Once every 5 years if you don't have a lipid disorder. May order more often based on risk factors. Lipid panel: 04/19/2023    Screening Not Indicated  History Lipid Disorder     Other Preventive Screenings Covered by Medicare:  Abdominal Aortic Aneurysm (AAA) Screening: covered once if your at risk. You're considered to be at risk if you have a family history of AAA.  Lung Cancer Screening: covers low dose CT scan once per year if you meet all of the following conditions: (1) Age 55-77; (2) No signs or symptoms of lung cancer; (3) Current smoker or have quit smoking within the last 15 years; (4) You have a tobacco smoking history of at least 20 pack years (packs per day multiplied by number of years you smoked); (5) You get a written order from a healthcare provider.  Glaucoma Screening: covered annually if you're considered high risk: (1) You have diabetes OR (2) Family history of glaucoma OR (3)  aged 50 and older OR (4)  American aged 65 and older  Osteoporosis Screening: covered every 2 years if you meet one of the following conditions: (1) You're estrogen deficient and at risk for osteoporosis based off medical history and other findings; (2) Have a vertebral abnormality; (3) On glucocorticoid therapy for more than 3 months; (4) Have primary hyperparathyroidism; (5) On osteoporosis medications and need to assess response to drug therapy.   Last bone density test (DXA Scan): 06/29/2023.  HIV Screening: covered annually if you're between the age of 15-65. Also covered annually if you are younger than 15 and older than 65 with risk factors for HIV infection. For pregnant  patients, it is covered up to 3 times per pregnancy.    Immunizations:  Immunization Recommendations   Influenza Vaccine Annual influenza vaccination during flu season is recommended for all persons aged >= 6 months who do not have contraindications   Pneumococcal Vaccine   * Pneumococcal conjugate vaccine = PCV13 (Prevnar 13), PCV15 (Vaxneuvance), PCV20 (Prevnar 20)  * Pneumococcal polysaccharide vaccine = PPSV23 (Pneumovax) Adults 19-65 yo with certain risk factors or if 65+ yo  If never received any pneumonia vaccine: recommend Prevnar 20 (PCV20)  Give PCV20 if previously received 1 dose of PCV13 or PPSV23   Hepatitis B Vaccine 3 dose series if at intermediate or high risk (ex: diabetes, end stage renal disease, liver disease)   Respiratory syncytial virus (RSV) Vaccine - COVERED BY MEDICARE PART D  * RSVPreF3 (Arexvy) CDC recommends that adults 60 years of age and older may receive a single dose of RSV vaccine using shared clinical decision-making (SCDM)   Tetanus (Td) Vaccine - COST NOT COVERED BY MEDICARE PART B Following completion of primary series, a booster dose should be given every 10 years to maintain immunity against tetanus. Td may also be given as tetanus wound prophylaxis.   Tdap Vaccine - COST NOT COVERED BY MEDICARE PART B Recommended at least once for all adults. For pregnant patients, recommended with each pregnancy.   Shingles Vaccine (Shingrix) - COST NOT COVERED BY MEDICARE PART B  2 shot series recommended in those 19 years and older who have or will have weakened immune systems or those 50 years and older     Health Maintenance Due:      Topic Date Due    Breast Cancer Screening: Mammogram  06/23/2024    Colorectal Cancer Screening  05/02/2026    Hepatitis C Screening  Completed     Immunizations Due:      Topic Date Due    Influenza Vaccine (1) 09/01/2023    COVID-19 Vaccine (5 - 2023-24 season) 09/01/2023     Advance Directives   What are advance directives?  Advance directives are legal  documents that state your wishes and plans for medical care. These plans are made ahead of time in case you lose your ability to make decisions for yourself. Advance directives can apply to any medical decision, such as the treatments you want, and if you want to donate organs.   What are the types of advance directives?  There are many types of advance directives, and each state has rules about how to use them. You may choose a combination of any of the following:  Living will:  This is a written record of the treatment you want. You can also choose which treatments you do not want, which to limit, and which to stop at a certain time. This includes surgery, medicine, IV fluid, and tube feedings.   Durable power of  for healthcare (DPAHC):  This is a written record that states who you want to make healthcare choices for you when you are unable to make them for yourself. This person, called a proxy, is usually a family member or a friend. You may choose more than 1 proxy.  Do not resuscitate (DNR) order:  A DNR order is used in case your heart stops beating or you stop breathing. It is a request not to have certain forms of treatment, such as CPR. A DNR order may be included in other types of advance directives.  Medical directive:  This covers the care that you want if you are in a coma, near death, or unable to make decisions for yourself. You can list the treatments you want for each condition. Treatment may include pain medicine, surgery, blood transfusions, dialysis, IV or tube feedings, and a ventilator (breathing machine).  Values history:  This document has questions about your views, beliefs, and how you feel and think about life. This information can help others choose the care that you would choose.  Why are advance directives important?  An advance directive helps you control your care. Although spoken wishes may be used, it is better to have your wishes written down. Spoken wishes can be  misunderstood, or not followed. Treatments may be given even if you do not want them. An advance directive may make it easier for your family to make difficult choices about your care.   Weight Management   Why it is important to manage your weight:  Being overweight increases your risk of health conditions such as heart disease, high blood pressure, type 2 diabetes, and certain types of cancer. It can also increase your risk for osteoarthritis, sleep apnea, and other respiratory problems. Aim for a slow, steady weight loss. Even a small amount of weight loss can lower your risk of health problems.  How to lose weight safely:  A safe and healthy way to lose weight is to eat fewer calories and get regular exercise. You can lose up about 1 pound a week by decreasing the number of calories you eat by 500 calories each day.   Healthy meal plan for weight management:  A healthy meal plan includes a variety of foods, contains fewer calories, and helps you stay healthy. A healthy meal plan includes the following:  Eat whole-grain foods more often.  A healthy meal plan should contain fiber. Fiber is the part of grains, fruits, and vegetables that is not broken down by your body. Whole-grain foods are healthy and provide extra fiber in your diet. Some examples of whole-grain foods are whole-wheat breads and pastas, oatmeal, brown rice, and bulgur.  Eat a variety of vegetables every day.  Include dark, leafy greens such as spinach, kale, saamra greens, and mustard greens. Eat yellow and orange vegetables such as carrots, sweet potatoes, and winter squash.   Eat a variety of fruits every day.  Choose fresh or canned fruit (canned in its own juice or light syrup) instead of juice. Fruit juice has very little or no fiber.  Eat low-fat dairy foods.  Drink fat-free (skim) milk or 1% milk. Eat fat-free yogurt and low-fat cottage cheese. Try low-fat cheeses such as mozzarella and other reduced-fat cheeses.  Choose meat and other  protein foods that are low in fat.  Choose beans or other legumes such as split peas or lentils. Choose fish, skinless poultry (chicken or turkey), or lean cuts of red meat (beef or pork). Before you cook meat or poultry, cut off any visible fat.   Use less fat and oil.  Try baking foods instead of frying them. Add less fat, such as margarine, sour cream, regular salad dressing and mayonnaise to foods. Eat fewer high-fat foods. Some examples of high-fat foods include french fries, doughnuts, ice cream, and cakes.  Eat fewer sweets.  Limit foods and drinks that are high in sugar. This includes candy, cookies, regular soda, and sweetened drinks.  Exercise:  Exercise at least 30 minutes per day on most days of the week. Some examples of exercise include walking, biking, dancing, and swimming. You can also fit in more physical activity by taking the stairs instead of the elevator or parking farther away from stores. Ask your healthcare provider about the best exercise plan for you.      © Copyright "[employer]" 2018 Information is for End User's use only and may not be sold, redistributed or otherwise used for commercial purposes. All illustrations and images included in CareNotes® are the copyrighted property of [employer] or [employer]    Here for awv and is seeing cardiology and takes all meds as directed and also is grieving and is stable. Needs advanced care planning and home is safe and ADL's intact. Call if any problems. Diet and exercise to get BMI lower than 25.

## 2024-04-23 NOTE — PROGRESS NOTES
Assessment and Plan:     Problem List Items Addressed This Visit          Cardiovascular and Mediastinum    Hypertension    Relevant Orders    Comprehensive metabolic panel       Digestive    GERD (gastroesophageal reflux disease)    Relevant Orders    CBC and differential       Other    Hyperlipidemia    Relevant Orders    Comprehensive metabolic panel    Lipid Panel with Direct LDL reflex    Obesity (BMI 30-39.9)     Other Visit Diagnoses       Medicare annual wellness visit, subsequent    -  Primary    Health care maintenance        Relevant Orders    Comprehensive metabolic panel    CBC and differential    Lipid Panel with Direct LDL reflex             Preventive health issues were discussed with patient, and age appropriate screening tests were ordered as noted in patient's After Visit Summary.  Personalized health advice and appropriate referrals for health education or preventive services given if needed, as noted in patient's After Visit Summary.     History of Present Illness:     Patient presents for a Medicare Wellness Visit    HPI   Patient Care Team:  Adolph Apodaca DO as PCP - General  DO Fish Moody DO     Review of Systems:     Review of Systems     Problem List:     Patient Active Problem List   Diagnosis    GERD (gastroesophageal reflux disease)    Hypertension    Hyperlipidemia    Obesity (BMI 30-39.9)    Anxiety    Vasovagal near syncope    Orthostatic hypotension    Paroxysmal SVT (supraventricular tachycardia)      Past Medical and Surgical History:     Past Medical History:   Diagnosis Date    Arthritis     Asthma     childhood    Chronic narcotic dependence (HCC)     Chronic pain disorder     low back- 2 back surgeries in past    Cystocele with rectocele     Diverticulosis     GERD (gastroesophageal reflux disease)     Hx MRSA infection     2014- left thigh after surgery for melanoma    Hyperlipidemia     Hypertension     Malignant melanoma (HCC)     left thigh/  right shoulder 2014    Migraines     Premature ventricular contraction     Last Assessed:  8/5/13    Uterovaginal prolapse     Wears partial dentures     upper     Past Surgical History:   Procedure Laterality Date    BACK SURGERY      lumbar x 2    BUNIONECTOMY Bilateral     CHOLECYSTECTOMY      COLONOSCOPY      Complete    ESOPHAGOGASTRODUODENOSCOPY      Diagnostic    INCISIONAL HERNIA REPAIR      OK COLPOPEXY VAGINAL EXTRAPERITONEAL APPROACH N/A 1/28/2020    Procedure: VE COLPOSUSPENSION (ENPLACE) with posterior repair;  Surgeon: Ez Enriquez MD;  Location: [facility];  Service: UroGynecology           OK CYSTOURETHROSCOPY N/A 1/28/2020    Procedure: CYSTOSCOPY;  Surgeon: Ez Enriquez MD;  Location: [facility];  Service: UroGynecology           OK SLING OPERATION STRESS INCONTINENCE N/A 1/28/2020    Procedure: SINGLE INCISION SLING;  Surgeon: Ez Enriquez MD;  Location: [facility];  Service: UroGynecology           SKIN CANCER EXCISION      melanoma left thigh / right shoulder      Family History:     Family History   Problem Relation Age of Onset    Diabetes type II Mother     Stomach cancer Father 70    Esophageal cancer Father 70    No Known Problems Sister     No Known Problems Sister     No Known Problems Daughter     No Known Problems Maternal Grandmother     No Known Problems Maternal Grandfather     No Known Problems Paternal Grandmother     Prostate cancer Paternal Grandfather 80    Lung cancer Paternal Uncle 70    Brain cancer Paternal Uncle 60    No Known Problems Maternal Aunt     No Known Problems Maternal Aunt     No Known Problems Maternal Aunt     No Known Problems Maternal Aunt     No Known Problems Paternal Aunt       Social History:     Social History     Socioeconomic History    Marital status: /Civil Union     Spouse name: None    Number of children: None    Years of education: None    Highest education level: None   Occupational History    None   Tobacco Use    Smoking  status: Never     Passive exposure: Never    Smokeless tobacco: Never    Tobacco comments:     Per allscripts:  Never a smoker/Unknown if every smoked   Vaping Use    Vaping status: Never Used   Substance and Sexual Activity    Alcohol use: Yes     Comment: wine/ liquor, rarely    Drug use: No    Sexual activity: Not Currently     Partners: Male   Other Topics Concern    None   Social History Narrative    Feels safe at home     Social Determinants of Health     Financial Resource Strain: Not on file   Food Insecurity: No Food Insecurity (4/16/2024)    Hunger Vital Sign     Worried About Running Out of Food in the Last Year: Never true     Ran Out of Food in the Last Year: Never true   Transportation Needs: No Transportation Needs (4/16/2024)    PRAPARE - Transportation     Lack of Transportation (Medical): No     Lack of Transportation (Non-Medical): No   Physical Activity: Not on file   Stress: Not on file   Social Connections: Not on file   Intimate Partner Violence: Not on file   Housing Stability: Low Risk  (4/16/2024)    Housing Stability Vital Sign     Unable to Pay for Housing in the Last Year: No     Number of Places Lived in the Last Year: 1     Unstable Housing in the Last Year: No      Medications and Allergies:     Current Outpatient Medications   Medication Sig Dispense Refill    acetaminophen (TYLENOL) 500 mg tablet Take 1 tablet (500 mg total) by mouth every 6 (six) hours as needed for mild pain 30 tablet 0    albuterol (PROVENTIL HFA,VENTOLIN HFA) 90 mcg/act inhaler Inhale 2 puffs every 4 (four) hours as needed for wheezing 1 g 0    ascorbic acid (VITAMIN C) 250 mg tablet Take 750 mg by mouth daily      Cholecalciferol 1000 units CHEW Chew daily       Cyanocobalamin (Vitamin B-12) 1000 MCG SUBL Place under the tongue      cyclobenzaprine (FLEXERIL) 10 mg tablet TAKE 1 TABLET (10 MG TOTAL) BY MOUTH 2 (TWO) TIMES A DAY AS NEEDED FOR MUSCLE SPASMS.  2    ezetimibe (ZETIA) 10 mg tablet TAKE 1 TABLET BY  MOUTH EVERY DAY 30 tablet 0    lisinopril-hydrochlorothiazide (PRINZIDE,ZESTORETIC) 10-12.5 MG per tablet Take 1 tablet by mouth daily 90 tablet 3    MAGNESIUM GLUCONATE PO Take 200 mg by mouth daily      meclizine (ANTIVERT) 25 mg tablet Take 1 tablet (25 mg total) by mouth daily as needed for dizziness or nausea 14 tablet 0    metoprolol succinate (TOPROL-XL) 25 mg 24 hr tablet Take 0.5 tablets (12.5 mg total) by mouth 2 (two) times a day 90 tablet 3    Misc Natural Products (ELDERBERRY IMMUNE COMPLEX PO) Take by mouth      Multiple Vitamin (MULTIVITAMIN) capsule Take 1 capsule by mouth daily        nortriptyline (PAMELOR) 10 mg capsule Take 10 mg by mouth daily at bedtime      pantoprazole (PROTONIX) 40 mg tablet TAKE 1 TABLET (40 MG TOTAL) BY MOUTH 2 (TWO) TIMES A DAY AS NEEDED (GERD) (Patient taking differently: Take 40 mg by mouth 2 (two) times a day as needed (gerd) Patient states that she takes this every day) 180 tablet 1    zinc gluconate 50 mg tablet Take 50 mg by mouth daily      valACYclovir (VALTREX) 500 mg tablet TAKE 1 TABLET (500 MG TOTAL) BY MOUTH DAILY AS NEEDED FOR SHINGLES PREVENTION 90 tablet 2     No current facility-administered medications for this visit.     Allergies   Allergen Reactions    Lyrica [Pregabalin] Itching and Rash     Reaction Date: [id number];     Buprenorphine Itching     If takes more than 3 days starts with itching    Diazepam Other (See Comments)     gets hyper    Hydrocodone Itching    Oxycodone-Acetaminophen Itching    Tramadol Itching    Nucynta [Tapentadol] Itching      Immunizations:     Immunization History   Administered Date(s) Administered    COVID-19 MODERNA VACC 0.5 ML IM 01/07/2021, 02/04/2021, 11/15/2021    COVID-19 Moderna Vac BIVALENT 12 Yr+ IM 0.5 ML 10/14/2022    Influenza, recombinant, quadrivalent,injectable, preservative free 11/22/2019    Pneumococcal Conjugate Vaccine 20-valent (Pcv20), Polysace 04/19/2023      Health Maintenance:         Topic Date  Due    Breast Cancer Screening: Mammogram  06/23/2024    Colorectal Cancer Screening  05/02/2026    Hepatitis C Screening  Completed         Topic Date Due    Influenza Vaccine (1) 09/01/2023    COVID-19 Vaccine (5 - 2023-24 season) 09/01/2023      Medicare Screening Tests and Risk Assessments:     Magda is here for her Subsequent Wellness visit.     Health Risk Assessment:   Patient rates overall health as good. Patient feels that their physical health rating is same. Patient is satisfied with their life. Eyesight was rated as same. Hearing was rated as same. Patient feels that their emotional and mental health rating is slightly worse. Patients states they are never, rarely angry. Patient states they are sometimes unusually tired/fatigued. Pain experienced in the last 7 days has been some. Patient's pain rating has been 6/10. Patient states that she has experienced no weight loss or gain in last 6 months. Death of spouse/grieving and dog is upset    Depression Screening:   PHQ-2 Score: 0      Fall Risk Screening:   In the past year, patient has experienced: no history of falling in past year      Urinary Incontinence Screening:   Patient has not leaked urine accidently in the last six months.     Home Safety:  Patient does not have trouble with stairs inside or outside of their home. Patient has working smoke alarms and has working carbon monoxide detector. Home safety hazards include: none. Checks batteries on smoke and CO detectors    Nutrition:   Current diet is Low Carb.     Medications:   Patient is currently taking over-the-counter supplements. OTC medications include: see medication list. Patient is able to manage medications.     Activities of Daily Living (ADLs)/Instrumental Activities of Daily Living (IADLs):   Walk and transfer into and out of bed and chair?: Yes  Dress and groom yourself?: Yes    Bathe or shower yourself?: Yes    Feed yourself? Yes  Do your laundry/housekeeping?: Yes  Manage your  "money, pay your bills and track your expenses?: Yes  Make your own meals?: Yes    Do your own shopping?: Yes    Previous Hospitalizations:   Any hospitalizations or ED visits within the last 12 months?: No      Advance Care Planning:   Living will: No    Durable POA for healthcare: No    Advanced directive: No      PREVENTIVE SCREENINGS      Cardiovascular Screening:    General: Screening Not Indicated and History Lipid Disorder      Diabetes Screening:     General: Screening Current      Colorectal Cancer Screening:     General: Screening Current      Breast Cancer Screening:     General: Screening Current      Cervical Cancer Screening:    General: Screening Not Indicated      Lung Cancer Screening:     General: Screening Not Indicated      Hepatitis C Screening:    General: Screening Current    Screening, Brief Intervention, and Referral to Treatment (SBIRT)    Screening  Typical number of drinks in a day: 0  Typical number of drinks in a week: 0  Interpretation: Low risk drinking behavior.    AUDIT-C Screenin) How often did you have a drink containing alcohol in the past year? monthly or less  2) How many drinks did you have on a typical day when you were drinking in the past year? 1 to 2  3) How often did you have 6 or more drinks on one occasion in the past year? never    AUDIT-C Score: 1  Interpretation: Score 0-2 (female): Negative screen for alcohol misuse    Single Item Drug Screening:  How often have you used an illegal drug (including marijuana) or a prescription medication for non-medical reasons in the past year? never    Single Item Drug Screen Score: 0  Interpretation: Negative screen for possible drug use disorder    No results found.     Physical Exam:     /70   Pulse 90   Temp (!) 96.4 °F (35.8 °C) (Temporal)   Resp 16   Ht 5' 4\" (1.626 m)   Wt 87.8 kg (193 lb 9.6 oz)   SpO2 96%   BMI 33.23 kg/m²     Physical Exam  Constitutional:       Appearance: Normal appearance.   HENT:      " Head: Normocephalic and atraumatic.      Right Ear: External ear normal.      Left Ear: External ear normal.      Nose: Nose normal.      Mouth/Throat:      Mouth: Mucous membranes are moist.   Eyes:      Extraocular Movements: Extraocular movements intact.      Conjunctiva/sclera: Conjunctivae normal.      Pupils: Pupils are equal, round, and reactive to light.   Cardiovascular:      Rate and Rhythm: Normal rate and regular rhythm.      Pulses: Normal pulses.   Pulmonary:      Effort: Pulmonary effort is normal.   Musculoskeletal:         General: Normal range of motion.      Cervical back: Normal range of motion.   Skin:     General: Skin is warm and dry.      Capillary Refill: Capillary refill takes less than 2 seconds.   Neurological:      General: No focal deficit present.      Mental Status: She is alert and oriented to person, place, and time. Mental status is at baseline.   Psychiatric:         Mood and Affect: Mood normal.         Behavior: Behavior normal.         Thought Content: Thought content normal.         Judgment: Judgment normal.          Adolph Apodaca DO

## 2024-05-02 ENCOUNTER — CONSULT (OUTPATIENT)
Dept: NEUROLOGY | Facility: CLINIC | Age: 69
End: 2024-05-02
Payer: COMMERCIAL

## 2024-05-02 VITALS
DIASTOLIC BLOOD PRESSURE: 57 MMHG | TEMPERATURE: 98 F | SYSTOLIC BLOOD PRESSURE: 124 MMHG | HEIGHT: 64 IN | WEIGHT: 193.2 LBS | OXYGEN SATURATION: 98 % | BODY MASS INDEX: 32.98 KG/M2 | HEART RATE: 92 BPM

## 2024-05-02 DIAGNOSIS — R42 DIZZINESS: ICD-10-CM

## 2024-05-02 DIAGNOSIS — Z86.73 HISTORY OF ISCHEMIC STROKE: Primary | ICD-10-CM

## 2024-05-02 DIAGNOSIS — R55 VASOVAGAL NEAR SYNCOPE: ICD-10-CM

## 2024-05-02 DIAGNOSIS — Z13.1 ENCOUNTER FOR SCREENING FOR DIABETES MELLITUS: ICD-10-CM

## 2024-05-02 DIAGNOSIS — R51.9 NONINTRACTABLE HEADACHE, UNSPECIFIED CHRONICITY PATTERN, UNSPECIFIED HEADACHE TYPE: ICD-10-CM

## 2024-05-02 PROCEDURE — 99205 OFFICE O/P NEW HI 60 MIN: CPT | Performed by: PSYCHIATRY & NEUROLOGY

## 2024-05-02 RX ORDER — FLUTICASONE PROPIONATE 50 MCG
1 SPRAY, SUSPENSION (ML) NASAL DAILY
COMMUNITY

## 2024-05-02 RX ORDER — ASPIRIN 81 MG/1
81 TABLET, CHEWABLE ORAL DAILY
Start: 2024-05-02

## 2024-05-02 NOTE — PROGRESS NOTES
Review of Systems   Constitutional:  Negative for appetite change, fatigue and fever.   HENT: Negative.  Negative for hearing loss, tinnitus, trouble swallowing and voice change.    Eyes: Negative.  Negative for photophobia, pain and visual disturbance.   Respiratory: Negative.  Negative for shortness of breath.    Cardiovascular: Negative.  Negative for palpitations.   Gastrointestinal: Negative.  Negative for nausea and vomiting.   Endocrine: Negative.  Negative for cold intolerance.   Genitourinary: Negative.  Negative for dysuria, frequency and urgency.   Musculoskeletal:  Negative for back pain, gait problem, myalgias, neck pain and neck stiffness.   Skin: Negative.  Negative for rash.   Allergic/Immunologic: Negative.    Neurological:  Positive for dizziness, syncope and light-headedness. Negative for tremors, seizures, facial asymmetry, speech difficulty, weakness, numbness and headaches.   Hematological: Negative.  Does not bruise/bleed easily.   Psychiatric/Behavioral: Negative.  Negative for confusion, hallucinations and sleep disturbance.    All other systems reviewed and are negative.

## 2024-05-02 NOTE — PROGRESS NOTES
[facility]  Initial Consultation    Impression/Plan    Ms. Bernabe is a 68 y.o. female with occasional stereotyped presyncope for more than 10 years. The events have apparently never progressed to syncope/LOC. There are no epilepsy risk factors. Her neurological exam is normal. Event semiology strongly suggests syncope rather than seizure. She is following closely with cardiology with workup that has included extended Holter. We discussed the pathophysiology of syncope/epilepsy/seizure and safety/precautions. I encouraged her to immediately lie down if she experiences her typical prodrome to prevent symptoms from progressing. Following instructions from Cardiology, she discovered that arm-tensing recently seemed to improve her symptoms during an event. No additional neurological workup required a this point. Additional testing, including EEG, would be pursued if there is additional clinical concern for seizure.     The 2017 brain MRI revealed an old left caudate infarct. I recommend daily aspirin 81 mg. We discussed secondary stroke prevention including treatment of hypertension, management of hyperlipidemia (did not tolerate statin in past). No recent HbA1c.     Her headaches are recently relatively well controlled. I can see her back if headaches become more of a problem.     Patient Instructions   Start aspirin 81 mg daily for stroke prevention.   Return as needed.      Diagnoses and all orders for this visit:    History of ischemic stroke  -     aspirin 81 mg chewable tablet; Chew 1 tablet (81 mg total) daily  -     Hemoglobin A1C; Future    Vasovagal near syncope  -     Ambulatory Referral to Neurology    Dizziness  -     Ambulatory Referral to Neurology    Nonintractable headache, unspecified chronicity pattern, unspecified headache type  -     Ambulatory Referral to Neurology    Encounter for screening for diabetes mellitus  -     Hemoglobin A1C;  "Future        Subjective    Magda Bernabe is a 68 y.o. female presenting to the [facility] for evaluation of syncope.     Presyncope started years ago, more than 10. Could go a couple years without one. Never completely passes out, no loss of awareness. Typically feels like she needs to have a bowel movement and therefore heads to the bathroom. In 8/2023, had just learned that day of her husbands cancer diagnosis. Was on the toilet about to have bowel movement, no straining. Later than evening at home. Tingling and dry feeling in mouth, ears feel funny, lightheaded, nauseous, diaphoretic. Sat down on the toilet and then felt so bad she laid on the floor. Able to get back on the toilet only with her daughter's help after maybe 20 minutes (daughter lives 10 min away). No loss of awareness. Had diarrhea once she got back on the toilet. Didn't feel right the entire next day. Laid on couch for a bit and then went to bed. Other events she felt tired for 30 minutes. In 11/2023. Was at [place]. Had to use bathroom. Then had funny feeling in mouth. Called daughter on phone. Sitting on the toilet. 15-20 minutes. Felt better after bowel movement. Then had migraine headache. Never actually passed out. Doesn't usually have headache with the events. Most recent was at [facility] with family over. Son has had similar events. No vasovagal trigger.     Saw cardiology on 3/20/2024. TTE unremarkable. Extended Holter completed 12/2023: \"Overall borderline extended Holter monitor findings with small amount of extra heartbeats in the form of nonsustained supraventricular tachycardia.  These are unlikely to cause loss of consciousness but may cause palpitations.  Additional therapy can be considered if patient is experiencing intermittent palpitations. \" HCTZ-lisinopril decreased. Metoprolol started. Also noted some transient SOB at rest that will be monitored and worked up if it persists. TSH normal.     Taste and smell " "never came back after a cold. She saw ENT and they ordered a brain MRI in 2017 that revealed an old right caudate infarct. Previously on statin, but not tolerated. Currently ezetimibe. Never on daily asa. A1c was 5.6 in 2014, no more recent results in EMR.     Occasional migraines improve with Excedrin. Visual aura. Many years. Improved from the past. Sometimes would have speech difficulty during headache.    No events concerning for seizure. No staring spells, no evidence of seizure in sleep.     Current AEDs:  None    Epilepsy Risk Factors:  None  Normal birth and early development. No history of febrile seizures. No history of CNS infection. No family history of epilepsy.    Prior AEDs:  None    Prior Evaluation:  brain MRI in 2017 revealed an old right caudate infarct  No EEG    History Reviewed:   The following were reviewed and updated as appropriate: allergies, current medications, past family history, past medical history, past social history, past surgical history, and problem list     Psychiatric History:  Anxiety    Social History:   Driving: Yes   passed away in 12/2023      Objective    /57 (BP Location: Left arm, Patient Position: Sitting, Cuff Size: Adult)   Pulse 92   Temp 98 °F (36.7 °C) (Temporal)   Ht 5' 4\" (1.626 m)   Wt 87.6 kg (193 lb 3.2 oz)   SpO2 98%   BMI 33.16 kg/m²      General Exam  General: well developed, no acute distress.  HEENT: mucous membranes moist, anicteric sclera.   Neck:  good ROM.    Neurological Exam  Mental Status: awake, alert, and fully oriented to person, place, time, and situation. Attention and memory intact. Fund of knowledge is appropriate for age and education.  There is no neglect.  Language: fluency, naming, comprehension normal.       Cranial Nerves: Pupils equal and reactive to light.  Visual fields full to confrontation.  Extraocular motions intact with full versions, normal pursuits and saccades. Facial strength full and symmetric. Facial " sensation intact in V1-V3. Hearing intact to finger rub bilaterally. Tongue protrudes to midline. Palate elevates symmetrically. Speech clear without notable dysarthria. Shoulder shrug activation full and symmetric.    Motor: Normal bulk and tone. No pronator drift. Strength is 5/5 proximally and distally in all 4 extremities. No involuntary movements.    Sensory: Sensation intact to light touch distally in all extremities.    Coordination: Normal finger-to-nose.     Station and gait: Casual gait normal. Normal Romberg.    Reflexes: Reflexes 2+ throughout and symmetric.         Rakan Alfonso MD   [facility]  Diplomate, ABPN Neurology and Epilepsy      I have spent a total time of 60 minutes on 5/2/2024 in caring for this patient including Diagnostic results, Prognosis, Risks and benefits of tx options, Instructions for management, Patient and family education, Importance of tx compliance, Risk factor reductions, Impressions, Counseling / Coordination of care, Documenting in the medical record, Reviewing / ordering tests, medicine, procedures  , and Obtaining or reviewing history  .

## 2024-05-17 DIAGNOSIS — E78.5 HYPERLIPIDEMIA, UNSPECIFIED HYPERLIPIDEMIA TYPE: ICD-10-CM

## 2024-05-17 RX ORDER — EZETIMIBE 10 MG/1
TABLET ORAL
Qty: 90 TABLET | Refills: 1 | Status: SHIPPED | OUTPATIENT
Start: 2024-05-17

## 2024-05-21 ENCOUNTER — OFFICE VISIT (OUTPATIENT)
Dept: OBGYN CLINIC | Facility: MEDICAL CENTER | Age: 69
End: 2024-05-21
Payer: COMMERCIAL

## 2024-05-21 VITALS
DIASTOLIC BLOOD PRESSURE: 80 MMHG | BODY MASS INDEX: 32.81 KG/M2 | SYSTOLIC BLOOD PRESSURE: 120 MMHG | HEIGHT: 64 IN | WEIGHT: 192.2 LBS

## 2024-05-21 DIAGNOSIS — Z12.31 ENCOUNTER FOR SCREENING MAMMOGRAM FOR BREAST CANCER: ICD-10-CM

## 2024-05-21 DIAGNOSIS — Z01.419 ENCOUNTER FOR WELL WOMAN EXAM WITH ROUTINE GYNECOLOGICAL EXAM: Primary | ICD-10-CM

## 2024-05-21 DIAGNOSIS — N95.2 ATROPHIC VAGINITIS: ICD-10-CM

## 2024-05-21 DIAGNOSIS — Z12.4 PAP SMEAR FOR CERVICAL CANCER SCREENING: ICD-10-CM

## 2024-05-21 PROCEDURE — G0101 CA SCREEN;PELVIC/BREAST EXAM: HCPCS | Performed by: OBSTETRICS & GYNECOLOGY

## 2024-05-21 PROCEDURE — G0145 SCR C/V CYTO,THINLAYER,RESCR: HCPCS | Performed by: OBSTETRICS & GYNECOLOGY

## 2024-05-21 PROCEDURE — G0476 HPV COMBO ASSAY CA SCREEN: HCPCS | Performed by: OBSTETRICS & GYNECOLOGY

## 2024-05-21 RX ORDER — ESTRADIOL 0.1 MG/G
1 CREAM VAGINAL 2 TIMES WEEKLY
Qty: 42.5 G | Refills: 11 | Status: SHIPPED | OUTPATIENT
Start: 2024-05-23

## 2024-05-21 NOTE — PROGRESS NOTES
ASSESSMENT & PLAN: Magda Bernabe is a 68 y.o.  with normal gynecologic exam.    1.  Routine well woman exam done today  2.  Pap and HPV:  The patient's last pap and hpv was .    It was normal. Pap and cotesting was done today.    Current ASCCP Guidelines reviewed.   3.  Mammogram ordered  4.  Colonoscopy 2016  5. The following were reviewed in today's visit: mammography screening ordered      CC:  Annual Gynecologic Examination    HPI: Magda Bernabe is a 68 y.o.  who presents for annual gynecologic examination.  She has the following concerns:  no GYN complaints, doing well.  She had pelvic reconstructive surgery with , with good results.  She not currently sexually active.  Her  recently passed away.    Health Maintenance:    She wears her seatbelt routinely.    She does perform regular monthly self breast exams.    She feels safe at home.     Pap: 2019  Last mammogram:    Last colonoscopy:     Past Medical History:   Diagnosis Date    Arthritis     Asthma     childhood    Chronic narcotic dependence (HCC)     Chronic pain disorder     low back- 2 back surgeries in past    Cystocele with rectocele     Diverticulosis     GERD (gastroesophageal reflux disease)     Herpes No idea    Hx MRSA infection     2014- left thigh after surgery for melanoma    Hyperlipidemia     Hypertension     Malignant melanoma (HCC)     left thigh/ right shoulder 2014    Migraines     Premature ventricular contraction     Last Assessed:  13    Uterovaginal prolapse     Wears partial dentures     upper       Past Surgical History:   Procedure Laterality Date    BACK SURGERY      lumbar x 2    BUNIONECTOMY Bilateral     CHOLECYSTECTOMY      COLONOSCOPY      Complete    ESOPHAGOGASTRODUODENOSCOPY      Diagnostic    INCISIONAL HERNIA REPAIR      MO COLPOPEXY VAGINAL EXTRAPERITONEAL APPROACH N/A 2020    Procedure: VE COLPOSUSPENSION (ENPLACE) with posterior repair;  Surgeon: Ez Enriquez MD;   Location: [facility];  Service: UroGynecology           WI CYSTOURETHROSCOPY N/A 2020    Procedure: CYSTOSCOPY;  Surgeon: Ez Enriquez MD;  Location: [facility];  Service: UroGynecology           WI SLING OPERATION STRESS INCONTINENCE N/A 2020    Procedure: SINGLE INCISION SLING;  Surgeon: Ez Enriquez MD;  Location: [facility];  Service: UroGynecology           SKIN CANCER EXCISION      melanoma left thigh / right shoulder       Past OB/Gyn History:  OB History          4    Para   4    Term   3       1    AB        Living   3         SAB        IAB        Ectopic        Multiple        Live Births   3               Pt does not have menstrual issues. menopausal   History of sexually transmitted infection: No.  History of abnormal pap smears: No .    Patient is currently sexually active.  heterosexual   The current method of family planning is none.    Family History   Problem Relation Age of Onset    Diabetes type II Mother     Hyperlipidemia Mother     Stomach cancer Father 70    Esophageal cancer Father 70    Hypertension Father     Migraines Father     No Known Problems Sister     No Known Problems Sister     No Known Problems Daughter     No Known Problems Maternal Grandmother     No Known Problems Maternal Grandfather     No Known Problems Paternal Grandmother     Prostate cancer Paternal Grandfather 80    Lung cancer Paternal Uncle 70    Brain cancer Paternal Uncle 60    No Known Problems Maternal Aunt     No Known Problems Maternal Aunt     No Known Problems Maternal Aunt     No Known Problems Maternal Aunt     No Known Problems Paternal Aunt        Social History:  Social History     Socioeconomic History    Marital status: /Civil Union     Spouse name: Not on file    Number of children: Not on file    Years of education: Not on file    Highest education level: Not on file   Occupational History    Not on file   Tobacco Use    Smoking status: Never     Passive  exposure: Never    Smokeless tobacco: Never    Tobacco comments:     Per allscripts:  Never a smoker/Unknown if every smoked   Vaping Use    Vaping status: Never Used   Substance and Sexual Activity    Alcohol use: Yes     Comment: wine/ liquor, rarely    Drug use: No    Sexual activity: Not Currently     Partners: Male     Birth control/protection: Post-menopausal   Other Topics Concern    Not on file   Social History Narrative    Feels safe at home     Social Determinants of Health     Financial Resource Strain: Not on file   Food Insecurity: No Food Insecurity (4/16/2024)    Hunger Vital Sign     Worried About Running Out of Food in the Last Year: Never true     Ran Out of Food in the Last Year: Never true   Transportation Needs: No Transportation Needs (4/16/2024)    PRAPARE - Transportation     Lack of Transportation (Medical): No     Lack of Transportation (Non-Medical): No   Physical Activity: Not on file   Stress: Not on file   Social Connections: Not on file   Intimate Partner Violence: Not on file   Housing Stability: Low Risk  (4/16/2024)    Housing Stability Vital Sign     Unable to Pay for Housing in the Last Year: No     Number of Places Lived in the Last Year: 1     Unstable Housing in the Last Year: No       Allergies   Allergen Reactions    Lyrica [Pregabalin] Itching and Rash     Reaction Date: [id number];     Buprenorphine Itching     If takes more than 3 days starts with itching    Diazepam Other (See Comments)     gets hyper    Hydrocodone Itching    Oxycodone-Acetaminophen Itching    Tramadol Itching    Nucynta [Tapentadol] Itching       Current Outpatient Medications:     acetaminophen (TYLENOL) 500 mg tablet, Take 1 tablet (500 mg total) by mouth every 6 (six) hours as needed for mild pain, Disp: 30 tablet, Rfl: 0    albuterol (PROVENTIL HFA,VENTOLIN HFA) 90 mcg/act inhaler, Inhale 2 puffs every 4 (four) hours as needed for wheezing, Disp: 1 g, Rfl: 0    ascorbic acid (VITAMIN C) 250 mg  tablet, Take 750 mg by mouth daily, Disp: , Rfl:     aspirin 81 mg chewable tablet, Chew 1 tablet (81 mg total) daily, Disp: , Rfl:     Cholecalciferol 1000 units CHEW, Chew daily , Disp: , Rfl:     Cyanocobalamin (Vitamin B-12) 1000 MCG SUBL, Place under the tongue, Disp: , Rfl:     cyclobenzaprine (FLEXERIL) 10 mg tablet, TAKE 1 TABLET (10 MG TOTAL) BY MOUTH 2 (TWO) TIMES A DAY AS NEEDED FOR MUSCLE SPASMS., Disp: , Rfl: 2    ezetimibe (ZETIA) 10 mg tablet, TAKE 1 TABLET BY MOUTH EVERY DAY, Disp: 90 tablet, Rfl: 1    Fexofenadine HCl (ALLEGRA PO), Take by mouth Once a day as needed, Disp: , Rfl:     fluticasone (FLONASE) 50 mcg/act nasal spray, 1 spray into each nostril daily, Disp: , Rfl:     lisinopril-hydrochlorothiazide (PRINZIDE,ZESTORETIC) 10-12.5 MG per tablet, Take 1 tablet by mouth daily, Disp: 90 tablet, Rfl: 3    MAGNESIUM GLUCONATE PO, Take 200 mg by mouth daily, Disp: , Rfl:     meclizine (ANTIVERT) 25 mg tablet, Take 1 tablet (25 mg total) by mouth daily as needed for dizziness or nausea, Disp: 14 tablet, Rfl: 0    metoprolol succinate (TOPROL-XL) 25 mg 24 hr tablet, Take 0.5 tablets (12.5 mg total) by mouth 2 (two) times a day, Disp: 90 tablet, Rfl: 3    Misc Natural Products (ELDERBERRY IMMUNE COMPLEX PO), Take by mouth, Disp: , Rfl:     Multiple Vitamin (MULTIVITAMIN) capsule, Take 1 capsule by mouth daily  , Disp: , Rfl:     nortriptyline (PAMELOR) 10 mg capsule, Take 10 mg by mouth daily at bedtime, Disp: , Rfl:     pantoprazole (PROTONIX) 40 mg tablet, TAKE 1 TABLET (40 MG TOTAL) BY MOUTH 2 (TWO) TIMES A DAY AS NEEDED (GERD) (Patient taking differently: Take 40 mg by mouth 2 (two) times a day as needed (gerd) Patient states that she takes this every day), Disp: 180 tablet, Rfl: 1    zinc gluconate 50 mg tablet, Take 50 mg by mouth daily, Disp: , Rfl:     valACYclovir (VALTREX) 500 mg tablet, TAKE 1 TABLET (500 MG TOTAL) BY MOUTH DAILY AS NEEDED FOR SHINGLES PREVENTION, Disp: 90 tablet, Rfl:  "2      Review of Systems  Constitutional :no fever, feels well, no tiredness, no recent weight gain or loss  ENT: no ear ache, no loss of hearing, no nosebleeds or nasal discharge, no sore throat or hoarseness.  Cardiovascular: no complaints of slow or fast heart beat, no chest pain, no palpitations, no leg claudication or lower extremity edema.  Respiratory: no complaints of shortness of shortness of breath, no CHAPPELL  Breasts:no complaints of breast pain, breast lump, or nipple discharge  Gastrointestinal: no complaints of abdominal pain, constipation, nausea, vomiting, or diarrhea or bloody stools  Genitourinary : no complaints of dysuria, incontinence, pelvic pain, no dysmenorrhea, vaginal discharge or abnormal vaginal bleeding and as noted in HPI.  Musculoskeletal: no complaints of arthralgia, no myalgia, no joint swelling or stiffness, no limb pain or swelling.  Integumentary: no complaints of skin rash or lesion, itching or dry skin  Neurological: no complaints of headache, no confusion, no numbness or tingling, no dizziness or fainting    Objective      /80   Ht 5' 4\" (1.626 m)   Wt 87.2 kg (192 lb 3.2 oz)   BMI 32.99 kg/m²     General:   appears stated age, cooperative, alert normal mood and affect   Neck: normal, supple,trachea midline, no masses   Heart: regular rate and rhythm, S1, S2 normal, no murmur, click, rub or gallop   Lungs: clear to auscultation bilaterally   Breasts: normal appearance, no masses or tenderness, Inspection negative, No nipple retraction or dimpling, No nipple discharge or bleeding, No axillary or supraclavicular adenopathy, Normal to palpation without dominant masses   Abdomen: soft, non-tender, without masses or organomegaly   Vulva: normal female genitalia, Bartholin's, Urethra, [place] normal, no lesions, normal female hair distribution   Vagina: vagina positive for cystocele with anterior prolapse   Urethra: normal   Cervix: No discharge, lesions   Uterus: normal size, " contour, position, consistency, mobility, non-tender   Adnexa: normal adnexa   Lymphatic palpation of lymph nodes in neck, axilla, groin and/or other locations: no lymphadenopathy or masses noted   Skin normal skin turgor and no rashes.   Psychiatric orientation to person, place, and time: normal. mood and affect: normal

## 2024-05-29 LAB
LAB AP GYN PRIMARY INTERPRETATION: NORMAL
Lab: NORMAL

## 2024-10-15 ENCOUNTER — OFFICE VISIT (OUTPATIENT)
Dept: CARDIOLOGY CLINIC | Facility: CLINIC | Age: 69
End: 2024-10-15
Payer: COMMERCIAL

## 2024-10-15 VITALS
SYSTOLIC BLOOD PRESSURE: 140 MMHG | BODY MASS INDEX: 33.47 KG/M2 | HEART RATE: 88 BPM | DIASTOLIC BLOOD PRESSURE: 68 MMHG | WEIGHT: 195 LBS

## 2024-10-15 DIAGNOSIS — I47.10 PAROXYSMAL SVT (SUPRAVENTRICULAR TACHYCARDIA) (HCC): Primary | ICD-10-CM

## 2024-10-15 DIAGNOSIS — I95.1 ORTHOSTATIC HYPOTENSION: ICD-10-CM

## 2024-10-15 DIAGNOSIS — I10 PRIMARY HYPERTENSION: ICD-10-CM

## 2024-10-15 PROCEDURE — 99214 OFFICE O/P EST MOD 30 MIN: CPT | Performed by: INTERNAL MEDICINE

## 2024-10-15 PROCEDURE — G2211 COMPLEX E/M VISIT ADD ON: HCPCS | Performed by: INTERNAL MEDICINE

## 2024-10-15 RX ORDER — LIDOCAINE 50 MG/G
PATCH TOPICAL
COMMUNITY

## 2024-10-15 NOTE — ASSESSMENT & PLAN NOTE
Blood pressure reasonably well-controlled on current medical regimen with lisinopril HCTZ and metoprolol succinate.  -- Will continue current medications.

## 2024-10-15 NOTE — ASSESSMENT & PLAN NOTE
RELEVANT FINDINGS   Overall well-controlled with no recent episodes of tachycardia.  She is on low-dose beta-blocker therapy.  Blood pressure is reasonably controlled.  Physical examination is unremarkable except for slightly increased BMI and venous static changes in lower extremities.   CURRENT RELEVANT MEDICATIONS Metoprolol succinate 12.5 mg once daily.   GOALS Prevent exacerbation of symptoms.   MEDICATION CHANGES Advising to continue current medications.   OTHER RECOMMENDATIONS Advising to continue to monitor heart rate and blood pressure significantly.  Advised to report to us if she has recurrence of symptoms.

## 2024-10-15 NOTE — ASSESSMENT & PLAN NOTE
Home blood pressure readings are well-controlled.  No symptoms of orthostatic hypotension noted.  -- Will continue current medications.

## 2024-10-15 NOTE — PROGRESS NOTES
[facility]  [facility]  Primary Office: [address] [place] [id number]  Phone: [phone]; Fax: [phone]  *-*-*-*-*-*-*-*-*-*-*-*-*-*-*-*-*-*-*-*-*-*-*-*-*-*-*-*-*-*-*-*-*-*-*-*-*-*-*-*-*-*-*-*-*-*-*-*-*-*-*-*-*-*  ENCOUNTER DATE: 10/15/24 12:13 PM  PATIENT NAME: Magda Bernabe   1955    [id number]  AGE:69 y.o.      SEX: female  ENCOUNTER PROVIDER: Enrique Hester MD, A, [facility]  PRIMARY CARE PHYSICIAN: Adolph Apodaca DO    Diagnoses:  1. Recurrent syncope, suspected vasovagal syncope  2. Orthostatic hypotension  3. Primary hypertension  4. Dyslipidemia  5.  Degenerative joint disease status post back surgeries x2  6.  History of malignant melanoma status post removal in 2014  7.  Migraine headaches  8.  History of PVCs  9.  History of incisional hernia repair  10.  Obesity  12.  Vertigo  13. Chronic pain disorder  14.  Mild intermittent asthma    Echocardiogram 2/20/2024:  Normal left ventricular size and systolic function, EF 55%.  Grade 1 diastolic dysfunction.  Normal right ventricle size and function.  Normal left and right atrial cavity size.  Intact interatrial septum.  Normal aortic valve, no arctic valve stenosis or regurgitation.  Mild mitral valve annular calcification.  No mitral valve stenosis or regurgitation.  No tricuspid valve or pulmonic valve regurgitation.  Normal aortic root size.  Normal inferior vena cava.  No obvious pulmonary hypertension.  No pericardial effusion.    EXTENDED HOLTER MONITOR REVIEW COMPLETED.  Results noted in progress note dated 12/31/2023.  Overall borderline extended Holter monitor findings with small amount of extra heartbeats in the form of nonsustained supraventricular tachycardia.  These are unlikely to cause loss of consciousness but may cause palpitations.  Additional therapy can be considered if patient is experiencing intermittent palpitations.      CURRENT ECG   No results found for this visit on  10/15/24.     CARDIOLOGY ASSESSMENT & PLAN      Diagnosis ICD-10-CM Associated Orders   1. Paroxysmal SVT (supraventricular tachycardia) ([facility])  I47.10       2. Orthostatic hypotension  I95.1       3. Primary hypertension  I10          1. Paroxysmal SVT (supraventricular tachycardia) ([facility])  Assessment & Plan:    RELEVANT FINDINGS   Overall well-controlled with no recent episodes of tachycardia.  She is on low-dose beta-blocker therapy.  Blood pressure is reasonably controlled.  Physical examination is unremarkable except for slightly increased BMI and venous static changes in lower extremities.   CURRENT RELEVANT MEDICATIONS Metoprolol succinate 12.5 mg once daily.   GOALS Prevent exacerbation of symptoms.   MEDICATION CHANGES Advising to continue current medications.   OTHER RECOMMENDATIONS Advising to continue to monitor heart rate and blood pressure significantly.  Advised to report to us if she has recurrence of symptoms.     2. Orthostatic hypotension  Assessment & Plan:  Home blood pressure readings are well-controlled.  No symptoms of orthostatic hypotension noted.  -- Will continue current medications.  3. Primary hypertension  Assessment & Plan:  Blood pressure reasonably well-controlled on current medical regimen with lisinopril HCTZ and metoprolol succinate.  -- Will continue current medications.        INTERVAL HISTORY, HISTORY OF PRESENT ILLNESS & COMPREHENSIVE VISIT INFORMATION     Magda Bernabe is here for follow-up regarding her cardiac comorbidities which include: History of recurrent near syncope and syncope, nonsustained supraventricular tachycardia, orthostatic hypotension.  She was last seen in 2024.  At that time we had made changes to her medications and metoprolol was added for suspected inappropriate sinus tachycardia.  She is here for follow-up today.  She mentions that since last visit she has had no new diagnosis or hospitalizations or significant illnesses.  From a symptom perspective she  reports that she had an episode of palpitations that occurred soon after she had started the new medical regimen.  This has not happened since then.  She denies unusual chest pain or palpitations again or orthopnea or PND or pedal edema.  Mentions that her symptoms are very bad following premature passing of her  last year are better controlled.  She has children and they are very involved and supportive.  Has had no presyncope/syncope events.      Has been recording home blood pressure and heart rate readings and they have been overall normal.         Functional capacity status: Good   (Excellent- >10 METs; Good: (7-10 METs); Moderate (4-7 METs); Poor (<= 4 METs)    Any chronic stressors: Good   (feeling of poor health, financial problems, and social isolation etc).    Tobacco or alcohol dependence: She does not smoke.  Reports drinking alcohol rarely.  She has 1 cup of tea in the morning and then has a cup to cup and a half of coffee during during the course of the day.    Current cardiac medications: Metoprolol succinate 12.5 mg once daily lisinopril HCTZ 10-12.5 mg daily ezetimibe 10 mg daily meclizine as needed for dizziness.  Multivitamin and other supplements.    Last recent comprehensive blood work available:   Blood work 9/15/2023: Sodium 136 potassium 4.3 chloride 102 bicarb 31 BUN 17 creatinine 0.75 GFR 82  Normal liver function test  Lipid profile 4/19/2023 total cholesterol 190 triglyceride 98 HDL 62 calculated LDL 98  Normal iron studies  Normal CBC  TSH 1.4  Free T40.87  Hemoglobin A1c 5.6 in 2020    The 10-year ASCVD risk score (Tyler SANTIZO, et al., 2019) is: 12.6%    Values used to calculate the score:      Age: 69 years      Sex: Female      Is Non- : No      Diabetic: No      Tobacco smoker: No      Systolic Blood Pressure: 140 mmHg      Is BP treated: Yes      HDL Cholesterol: 62 mg/dL      Total Cholesterol: 180 mg/dL  (Low risk: Less than <5%; borderline risk: >=5%  to <7.5%; intermediate risk: >=7.5% to <20%; high risk:>=20%)  Patient's ASCVD risk category: Intermediate risk    Major cardiac risk factors: Hypertension (Tobacco use, Hypertension, Family history of CHD, Primary severe hypercholesterolemia, DM, multiple major and risk enhancing factors)     Any cardiac clinical risk enhancers from available data: -- (Family history of premature CAD, patient's history of chronic kidney disease, primary hypercholesterolemia, metabolic syndrome, abnormal NATHALIE, inflammatory condition such as RA-HIV-psoriasis, RA-HIV-Psoriasis,, pregnancy related complications such as preeclampsia or premature delivery, early menopause, high risk ethnicity such as [place] , social deprivation, physical inactivity, nonalcoholic fatty liver disease psychosocial stress, major psychiatric illness, atrial fibrillation, left ventricular hypertrophy, obstructive sleep apnea, nonalcoholic fatty liver disease).    REVIEW OF SYSTEMS   Positive for: As noted above in HPI  Negative for: All remaining as reviewed below and in HPI.    SYSTEM SYMPTOMS REVIEWED:  General--weight change, fever, night sweats  Respiratory--cough, wheezing, shortness of breath, sputum production  Cardiovascular--chest pain, syncope, dyspnea on exertion, edema, decline in exercise tolerance, claudication   Gastrointestinal--persistent vomiting, diarrhea, abdominal distention, blood in stool   Muscular or skeletal--joint pain or swelling   Neurologic--headaches, syncope, abnormal movement  Hematologic--history of easy bruising and bleeding   Endocrine--thyroid enlargement, heat or cold intolerance, polyuria   Psychiatric--anxiety, depression     *-*-*-*-*-*-*-*-*-*-*-*-*-*-*-*-*-*-*-*-*-*-*-*-*-*-*-*-*-*-*-*-*-*-*-*-*-*-*-*-*-*-*-*-*-*-*-*-*-*-*-*-*-*-  VITAL SIGNS     CURRENT VITAL SIGNS:   Vitals:    10/15/24 1136   BP: 140/68   BP Location: Right arm   Patient Position: Sitting   Cuff Size: Large   Pulse: 88   Weight: 88.5 kg (195  lb)       BMI: Body mass index is 33.47 kg/m².    WEIGHTS:   Wt Readings from Last 25 Encounters:   10/15/24 88.5 kg (195 lb)   05/21/24 87.2 kg (192 lb 3.2 oz)   05/02/24 87.6 kg (193 lb 3.2 oz)   04/23/24 87.8 kg (193 lb 9.6 oz)   03/20/24 90.3 kg (199 lb)   02/20/24 89.4 kg (197 lb 1.5 oz)   11/13/23 89.4 kg (197 lb)   10/19/23 90.4 kg (199 lb 3.2 oz)   08/31/23 89.4 kg (197 lb 3.2 oz)   08/30/23 89.4 kg (197 lb)   06/23/23 92.1 kg (203 lb)   05/15/23 92.1 kg (203 lb)   04/19/23 89.7 kg (197 lb 12.8 oz)   01/03/22 90.7 kg (200 lb)   11/18/21 88.9 kg (196 lb)   08/21/20 84.8 kg (187 lb)   01/28/20 89.4 kg (197 lb)   01/09/20 90.1 kg (198 lb 9.6 oz)   11/22/19 89.9 kg (198 lb 3.2 oz)   04/25/19 87.1 kg (192 lb)   04/23/19 87.5 kg (192 lb 14.4 oz)   07/30/18 90.3 kg (199 lb)   07/20/18 90.5 kg (199 lb 9.6 oz)   12/06/16 90.5 kg (199 lb 8 oz)   01/22/16 90.3 kg (199 lb)        *-*-*-*-*-*-*-*-*-*-*-*-*-*-*-*-*-*-*-*-*-*-*-*-*-*-*-*-*-*-*-*-*-*-*-*-*-*-*-*-*-*-*-*-*-*-*-*-*-*-*-*-*-*-  PHYSICAL EXAM     General Appearance:    Alert, cooperative, no distress, appears stated age   Head, Eyes, ENT:    No obvious abnormality, moist mucous mebranes.   Neck:   Supple, no carotid bruit. No JVD, no obvious lymphadenoapthy   Back:     Symmetric, no curvature.   Lungs:     Respirations unlabored. Clear to auscultation bilaterally,    Chest wall:    No tenderness or deformity   Heart:    Regular rate and rhythm, S1 and S2 normal, no murmur, rub  or gallop.   Abdomen:     Soft, non-tender.   Extremities:   Extremities warm, no cyanosis or edema    Skin: Lower extremity varicosities and mild venostatic changes noted.   Normal skin color, texture, and turgor. No rashes or lesions   Neuro: Pt is alert and oriented time 3 with no gross motor deficits.   Psych/ behavioral: Mood is normal. Behavior is normal.     *-*-*-*-*-*-*-*-*-*-*-*-*-*-*-*-*-*-*-*-*-*-*-*-*-*-*-*-*-*-*-*-*-*-*-*-*-*-*-*-*-*-*-*-*-*-*-*-*-*-*-*-*-*-  CURRENT  MEDICATIONS LIST     Current Outpatient Medications:     acetaminophen (TYLENOL) 500 mg tablet, Take 1 tablet (500 mg total) by mouth every 6 (six) hours as needed for mild pain, Disp: 30 tablet, Rfl: 0    albuterol (PROVENTIL HFA,VENTOLIN HFA) 90 mcg/act inhaler, Inhale 2 puffs every 4 (four) hours as needed for wheezing, Disp: 1 g, Rfl: 0    ascorbic acid (VITAMIN C) 250 mg tablet, Take 750 mg by mouth daily, Disp: , Rfl:     aspirin 81 mg chewable tablet, Chew 1 tablet (81 mg total) daily, Disp: , Rfl:     Cholecalciferol 1000 units CHEW, Chew daily , Disp: , Rfl:     Cyanocobalamin (Vitamin B-12) 1000 MCG SUBL, Place under the tongue, Disp: , Rfl:     cyclobenzaprine (FLEXERIL) 10 mg tablet, TAKE 1 TABLET (10 MG TOTAL) BY MOUTH 2 (TWO) TIMES A DAY AS NEEDED FOR MUSCLE SPASMS., Disp: , Rfl: 2    estradiol (ESTRACE VAGINAL) 0.1 mg/g vaginal cream, Insert 1 g into the vagina 2 (two) times a week, Disp: 42.5 g, Rfl: 11    ezetimibe (ZETIA) 10 mg tablet, TAKE 1 TABLET BY MOUTH EVERY DAY, Disp: 90 tablet, Rfl: 1    Fexofenadine HCl (ALLEGRA PO), Take by mouth Once a day as needed, Disp: , Rfl:     fluticasone (FLONASE) 50 mcg/act nasal spray, 1 spray into each nostril daily, Disp: , Rfl:     lidocaine (LIDODERM) 5 %, APPLY 1 PATCH 12 HOURS ON AND 12 HOURS OFF, Disp: , Rfl:     lisinopril-hydrochlorothiazide (PRINZIDE,ZESTORETIC) 10-12.5 MG per tablet, Take 1 tablet by mouth daily, Disp: 90 tablet, Rfl: 3    MAGNESIUM GLUCONATE PO, Take 200 mg by mouth daily, Disp: , Rfl:     meclizine (ANTIVERT) 25 mg tablet, Take 1 tablet (25 mg total) by mouth daily as needed for dizziness or nausea, Disp: 14 tablet, Rfl: 0    metoprolol succinate (TOPROL-XL) 25 mg 24 hr tablet, Take 0.5 tablets (12.5 mg total) by mouth 2 (two) times a day, Disp: 90 tablet, Rfl: 3    Misc Natural Products (ELDERBERRY IMMUNE COMPLEX PO), Take by mouth, Disp: , Rfl:     Multiple Vitamin (MULTIVITAMIN) capsule, Take 1 capsule by mouth daily  , Disp: ,  Rfl:     nortriptyline (PAMELOR) 10 mg capsule, Take 10 mg by mouth daily at bedtime, Disp: , Rfl:     pantoprazole (PROTONIX) 40 mg tablet, TAKE 1 TABLET (40 MG TOTAL) BY MOUTH 2 (TWO) TIMES A DAY AS NEEDED (GERD) (Patient taking differently: Take 40 mg by mouth 2 (two) times a day as needed (gerd) Patient states that she takes this every day), Disp: 180 tablet, Rfl: 1    valACYclovir (VALTREX) 500 mg tablet, TAKE 1 TABLET (500 MG TOTAL) BY MOUTH DAILY AS NEEDED FOR SHINGLES PREVENTION, Disp: 90 tablet, Rfl: 2    zinc gluconate 50 mg tablet, Take 50 mg by mouth daily, Disp: , Rfl:       ALLERGIES     Allergies   Allergen Reactions    Lyrica [Pregabalin] Itching and Rash     Reaction Date: [id number];     Buprenorphine Itching     If takes more than 3 days starts with itching    Diazepam Other (See Comments)     gets hyper    Hydrocodone Itching    Oxycodone-Acetaminophen Itching    Tramadol Itching    Nucynta [Tapentadol] Itching       *-*-*-*-*-*-*-*-*-*-*-*-*-*-*-*-*-*-*-*-*-*-*-*-*-*-*-*-*-*-*-*-*-*-*-*-*-*-*-*-*-*-*-*-*-*-*-*-*-*-  SIGNATURES:   @SIG@   Enrique Hester MD; [facility]    *-*-*-*-*-*-*-*-*-*-*-*-*-*-*-*-*-*-*-*-*-*-*-*-*-*-*-*-*-*-*-*-*-*-*-*-*-*-*-*-*-*-*-*-*-*-*-*-*-*-*-*-*-*-  PAST MEDICAL HISTORY IN:  Past Medical History:   Diagnosis Date    Arthritis     Asthma     childhood    Chronic narcotic dependence (HCC)     Chronic pain disorder     low back- 2 back surgeries in past    Cystocele with rectocele     Diverticulosis     GERD (gastroesophageal reflux disease)     Herpes No idea    Hx MRSA infection     2014- left thigh after surgery for melanoma    Hyperlipidemia     Hypertension     Malignant melanoma (HCC)     left thigh/ right shoulder 2014    Migraines     Premature ventricular contraction     Last Assessed:  8/5/13    Uterovaginal prolapse     Wears partial dentures     upper    PAST SURGICAL HISTORY:  Past Surgical History:   Procedure Laterality Date    BACK SURGERY      lumbar x 2     BUNIONECTOMY Bilateral     CHOLECYSTECTOMY      COLONOSCOPY      Complete    ESOPHAGOGASTRODUODENOSCOPY      Diagnostic    INCISIONAL HERNIA REPAIR      NV COLPOPEXY VAGINAL EXTRAPERITONEAL APPROACH N/A 1/28/2020    Procedure: VE COLPOSUSPENSION (ENPLACE) with posterior repair;  Surgeon: Ez Enriquez MD;  Location: [facility];  Service: UroGynecology           NV CYSTOURETHROSCOPY N/A 1/28/2020    Procedure: CYSTOSCOPY;  Surgeon: Ez Enriquez MD;  Location: [facility];  Service: UroGynecology           NV SLING OPERATION STRESS INCONTINENCE N/A 1/28/2020    Procedure: SINGLE INCISION SLING;  Surgeon: Ez Enriquez MD;  Location: [facility];  Service: UroGynecology           SKIN CANCER EXCISION      melanoma left thigh / right shoulder         FAMILY HISTORY:  Family History   Problem Relation Age of Onset    Diabetes type II Mother     Hyperlipidemia Mother     Stomach cancer Father 70    Esophageal cancer Father 70    Hypertension Father     Migraines Father     No Known Problems Sister     No Known Problems Sister     No Known Problems Daughter     No Known Problems Maternal Grandmother     No Known Problems Maternal Grandfather     No Known Problems Paternal Grandmother     Prostate cancer Paternal Grandfather 80    Lung cancer Paternal Uncle 70    Brain cancer Paternal Uncle 60    No Known Problems Maternal Aunt     No Known Problems Maternal Aunt     No Known Problems Maternal Aunt     No Known Problems Maternal Aunt     No Known Problems Paternal Aunt     SOCIAL HISTORY:  Social History     Tobacco Use   Smoking Status Never    Passive exposure: Never   Smokeless Tobacco Never   Tobacco Comments    Per allscripts:  Never a smoker/Unknown if every smoked      Social History     Substance and Sexual Activity   Alcohol Use Yes    Comment: wine/ liquor, rarely     Social History     Substance and Sexual Activity   Drug Use No    [unfilled]        *-*-*-*-*-*-*-*-*-*-*-*-*-*-*-*-*-*-*-*-*-*-*-*-*-*-*-*-*-*-*-*-*-*-*-*-*-*-*-*-*-*-*-*-*-*-*-*-*-*-*-*-*-*

## 2024-10-15 NOTE — PATIENT INSTRUCTIONS
CARDIOLOGY ASSESSMENT & PLAN      Diagnosis ICD-10-CM Associated Orders   1. Paroxysmal SVT (supraventricular tachycardia) ([facility])  I47.10       2. Orthostatic hypotension  I95.1       3. Primary hypertension  I10          1. Paroxysmal SVT (supraventricular tachycardia) ([facility])  Assessment & Plan:    RELEVANT FINDINGS   Overall well-controlled with no recent episodes of tachycardia.  She is on low-dose beta-blocker therapy.  Blood pressure is reasonably controlled.  Physical examination is unremarkable except for slightly increased BMI and venous static changes in lower extremities.   CURRENT RELEVANT MEDICATIONS Metoprolol succinate 12.5 mg once daily.   GOALS Prevent exacerbation of symptoms.   MEDICATION CHANGES Advising to continue current medications.   OTHER RECOMMENDATIONS Advising to continue to monitor heart rate and blood pressure significantly.  Advised to report to us if she has recurrence of symptoms.     2. Orthostatic hypotension  Assessment & Plan:  Home blood pressure readings are well-controlled.  No symptoms of orthostatic hypotension noted.  -- Will continue current medications.  3. Primary hypertension  Assessment & Plan:  Blood pressure reasonably well-controlled on current medical regimen with lisinopril HCTZ and metoprolol succinate.  -- Will continue current medications.

## 2024-10-24 ENCOUNTER — APPOINTMENT (OUTPATIENT)
Dept: LAB | Facility: CLINIC | Age: 69
End: 2024-10-24
Payer: COMMERCIAL

## 2024-10-24 ENCOUNTER — RA CDI HCC (OUTPATIENT)
Dept: OTHER | Facility: HOSPITAL | Age: 69
End: 2024-10-24

## 2024-10-24 DIAGNOSIS — Z00.00 HEALTH CARE MAINTENANCE: ICD-10-CM

## 2024-10-24 DIAGNOSIS — K21.9 GASTROESOPHAGEAL REFLUX DISEASE, UNSPECIFIED WHETHER ESOPHAGITIS PRESENT: ICD-10-CM

## 2024-10-24 DIAGNOSIS — Z86.73 HISTORY OF ISCHEMIC STROKE: ICD-10-CM

## 2024-10-24 DIAGNOSIS — Z13.1 ENCOUNTER FOR SCREENING FOR DIABETES MELLITUS: ICD-10-CM

## 2024-10-24 DIAGNOSIS — E78.5 HYPERLIPIDEMIA, UNSPECIFIED HYPERLIPIDEMIA TYPE: ICD-10-CM

## 2024-10-24 DIAGNOSIS — I10 PRIMARY HYPERTENSION: ICD-10-CM

## 2024-10-24 LAB
ALBUMIN SERPL BCG-MCNC: 4 G/DL (ref 3.5–5)
ALP SERPL-CCNC: 56 U/L (ref 34–104)
ALT SERPL W P-5'-P-CCNC: 22 U/L (ref 7–52)
ANION GAP SERPL CALCULATED.3IONS-SCNC: 5 MMOL/L (ref 4–13)
AST SERPL W P-5'-P-CCNC: 22 U/L (ref 13–39)
BASOPHILS # BLD AUTO: 0.1 THOUSANDS/ΜL (ref 0–0.1)
BASOPHILS NFR BLD AUTO: 2 % (ref 0–1)
BILIRUB SERPL-MCNC: 0.64 MG/DL (ref 0.2–1)
BUN SERPL-MCNC: 16 MG/DL (ref 5–25)
CALCIUM SERPL-MCNC: 8.9 MG/DL (ref 8.4–10.2)
CHLORIDE SERPL-SCNC: 103 MMOL/L (ref 96–108)
CHOLEST SERPL-MCNC: 174 MG/DL
CO2 SERPL-SCNC: 31 MMOL/L (ref 21–32)
CREAT SERPL-MCNC: 0.75 MG/DL (ref 0.6–1.3)
EOSINOPHIL # BLD AUTO: 0.27 THOUSAND/ΜL (ref 0–0.61)
EOSINOPHIL NFR BLD AUTO: 4 % (ref 0–6)
ERYTHROCYTE [DISTWIDTH] IN BLOOD BY AUTOMATED COUNT: 12.4 % (ref 11.6–15.1)
EST. AVERAGE GLUCOSE BLD GHB EST-MCNC: 120 MG/DL
GFR SERPL CREATININE-BSD FRML MDRD: 81 ML/MIN/1.73SQ M
GLUCOSE P FAST SERPL-MCNC: 89 MG/DL (ref 65–99)
HBA1C MFR BLD: 5.8 %
HCT VFR BLD AUTO: 42 % (ref 34.8–46.1)
HDLC SERPL-MCNC: 50 MG/DL
HGB BLD-MCNC: 13.8 G/DL (ref 11.5–15.4)
IMM GRANULOCYTES # BLD AUTO: 0.01 THOUSAND/UL (ref 0–0.2)
IMM GRANULOCYTES NFR BLD AUTO: 0 % (ref 0–2)
LDLC SERPL CALC-MCNC: 104 MG/DL (ref 0–100)
LYMPHOCYTES # BLD AUTO: 2.34 THOUSANDS/ΜL (ref 0.6–4.47)
LYMPHOCYTES NFR BLD AUTO: 36 % (ref 14–44)
MCH RBC QN AUTO: 31.5 PG (ref 26.8–34.3)
MCHC RBC AUTO-ENTMCNC: 32.9 G/DL (ref 31.4–37.4)
MCV RBC AUTO: 96 FL (ref 82–98)
MONOCYTES # BLD AUTO: 0.59 THOUSAND/ΜL (ref 0.17–1.22)
MONOCYTES NFR BLD AUTO: 9 % (ref 4–12)
NEUTROPHILS # BLD AUTO: 3.22 THOUSANDS/ΜL (ref 1.85–7.62)
NEUTS SEG NFR BLD AUTO: 49 % (ref 43–75)
NRBC BLD AUTO-RTO: 0 /100 WBCS
PLATELET # BLD AUTO: 358 THOUSANDS/UL (ref 149–390)
PMV BLD AUTO: 10.7 FL (ref 8.9–12.7)
POTASSIUM SERPL-SCNC: 4.4 MMOL/L (ref 3.5–5.3)
PROT SERPL-MCNC: 6 G/DL (ref 6.4–8.4)
RBC # BLD AUTO: 4.38 MILLION/UL (ref 3.81–5.12)
SODIUM SERPL-SCNC: 139 MMOL/L (ref 135–147)
TRIGL SERPL-MCNC: 102 MG/DL
WBC # BLD AUTO: 6.53 THOUSAND/UL (ref 4.31–10.16)

## 2024-10-24 PROCEDURE — 36415 COLL VENOUS BLD VENIPUNCTURE: CPT

## 2024-10-24 PROCEDURE — 80061 LIPID PANEL: CPT

## 2024-10-24 PROCEDURE — 80053 COMPREHEN METABOLIC PANEL: CPT

## 2024-10-24 PROCEDURE — 83036 HEMOGLOBIN GLYCOSYLATED A1C: CPT

## 2024-10-24 PROCEDURE — 85025 COMPLETE CBC W/AUTO DIFF WBC: CPT

## 2024-10-26 DIAGNOSIS — B02.9 HERPES ZOSTER WITHOUT COMPLICATION: ICD-10-CM

## 2024-10-28 RX ORDER — VALACYCLOVIR HYDROCHLORIDE 500 MG/1
TABLET, FILM COATED ORAL
Qty: 90 TABLET | Refills: 2 | Status: SHIPPED | OUTPATIENT
Start: 2024-10-28 | End: 2025-01-26

## 2024-10-30 ENCOUNTER — OFFICE VISIT (OUTPATIENT)
Dept: FAMILY MEDICINE CLINIC | Facility: CLINIC | Age: 69
End: 2024-10-30
Payer: COMMERCIAL

## 2024-10-30 VITALS
HEIGHT: 64 IN | HEART RATE: 86 BPM | RESPIRATION RATE: 16 BRPM | DIASTOLIC BLOOD PRESSURE: 80 MMHG | OXYGEN SATURATION: 97 % | WEIGHT: 193 LBS | TEMPERATURE: 97.4 F | SYSTOLIC BLOOD PRESSURE: 130 MMHG | BODY MASS INDEX: 32.95 KG/M2

## 2024-10-30 DIAGNOSIS — R73.03 PREDIABETES: ICD-10-CM

## 2024-10-30 DIAGNOSIS — K21.9 GASTROESOPHAGEAL REFLUX DISEASE, UNSPECIFIED WHETHER ESOPHAGITIS PRESENT: ICD-10-CM

## 2024-10-30 DIAGNOSIS — E66.9 OBESITY (BMI 30-39.9): ICD-10-CM

## 2024-10-30 DIAGNOSIS — E78.5 HYPERLIPIDEMIA, UNSPECIFIED HYPERLIPIDEMIA TYPE: ICD-10-CM

## 2024-10-30 DIAGNOSIS — Z23 ENCOUNTER FOR IMMUNIZATION: ICD-10-CM

## 2024-10-30 DIAGNOSIS — F41.9 ANXIETY: ICD-10-CM

## 2024-10-30 DIAGNOSIS — I10 PRIMARY HYPERTENSION: ICD-10-CM

## 2024-10-30 DIAGNOSIS — Z00.00 HEALTH CARE MAINTENANCE: ICD-10-CM

## 2024-10-30 DIAGNOSIS — Z00.00 MEDICARE ANNUAL WELLNESS VISIT, SUBSEQUENT: Primary | ICD-10-CM

## 2024-10-30 PROCEDURE — 99214 OFFICE O/P EST MOD 30 MIN: CPT | Performed by: FAMILY MEDICINE

## 2024-10-30 PROCEDURE — G0008 ADMIN INFLUENZA VIRUS VAC: HCPCS

## 2024-10-30 PROCEDURE — 90662 IIV NO PRSV INCREASED AG IM: CPT

## 2024-10-30 PROCEDURE — G0439 PPPS, SUBSEQ VISIT: HCPCS | Performed by: FAMILY MEDICINE

## 2024-10-30 NOTE — PATIENT INSTRUCTIONS
Medicare Preventive Visit Patient Instructions  Thank you for completing your Welcome to Medicare Visit or Medicare Annual Wellness Visit today. Your next wellness visit will be due in one year (10/31/2025).  The screening/preventive services that you may require over the next 5-10 years are detailed below. Some tests may not apply to you based off risk factors and/or age. Screening tests ordered at today's visit but not completed yet may show as past due. Also, please note that scanned in results may not display below.  Preventive Screenings:  Service Recommendations Previous Testing/Comments   Colorectal Cancer Screening  * Colonoscopy    * Fecal Occult Blood Test (FOBT)/Fecal Immunochemical Test (FIT)  * Fecal DNA/Cologuard Test  * Flexible Sigmoidoscopy Age: 45-75 years old   Colonoscopy: every 10 years (may be performed more frequently if at higher risk)  OR  FOBT/FIT: every 1 year  OR  Cologuard: every 3 years  OR  Sigmoidoscopy: every 5 years  Screening may be recommended earlier than age 45 if at higher risk for colorectal cancer. Also, an individualized decision between you and your healthcare provider will decide whether screening between the ages of 76-85 would be appropriate. Colonoscopy: Not on file  FOBT/FIT: Not on file  Cologuard: 05/02/2023  Sigmoidoscopy: Not on file    Screening Current     Breast Cancer Screening Age: 40+ years old  Frequency: every 1-2 years  Not required if history of left and right mastectomy Mammogram: 06/23/2023    Screening Current   Cervical Cancer Screening Between the ages of 21-29, pap smear recommended once every 3 years.   Between the ages of 30-65, can perform pap smear with HPV co-testing every 5 years.   Recommendations may differ for women with a history of total hysterectomy, cervical cancer, or abnormal pap smears in past. Pap Smear: 05/21/2024    Screening Not Indicated   Hepatitis C Screening Once for adults born between 1945 and 1965  More frequently in  patients at high risk for Hepatitis C Hep C Antibody: 07/27/2018    Screening Current   Diabetes Screening 1-2 times per year if you're at risk for diabetes or have pre-diabetes Fasting glucose: 89 mg/dL (10/24/2024)  A1C: 5.8 % (10/24/2024)  Screening Current   Cholesterol Screening Once every 5 years if you don't have a lipid disorder. May order more often based on risk factors. Lipid panel: 10/24/2024    Screening Not Indicated  History Lipid Disorder     Other Preventive Screenings Covered by Medicare:  Abdominal Aortic Aneurysm (AAA) Screening: covered once if your at risk. You're considered to be at risk if you have a family history of AAA.  Lung Cancer Screening: covers low dose CT scan once per year if you meet all of the following conditions: (1) Age 55-77; (2) No signs or symptoms of lung cancer; (3) Current smoker or have quit smoking within the last 15 years; (4) You have a tobacco smoking history of at least 20 pack years (packs per day multiplied by number of years you smoked); (5) You get a written order from a healthcare provider.  Glaucoma Screening: covered annually if you're considered high risk: (1) You have diabetes OR (2) Family history of glaucoma OR (3)  aged 50 and older OR (4)  American aged 65 and older  Osteoporosis Screening: covered every 2 years if you meet one of the following conditions: (1) You're estrogen deficient and at risk for osteoporosis based off medical history and other findings; (2) Have a vertebral abnormality; (3) On glucocorticoid therapy for more than 3 months; (4) Have primary hyperparathyroidism; (5) On osteoporosis medications and need to assess response to drug therapy.   Last bone density test (DXA Scan): 06/29/2023.  HIV Screening: covered annually if you're between the age of 15-65. Also covered annually if you are younger than 15 and older than 65 with risk factors for HIV infection. For pregnant patients, it is covered up to 3 times  per pregnancy.    Immunizations:  Immunization Recommendations   Influenza Vaccine Annual influenza vaccination during flu season is recommended for all persons aged >= 6 months who do not have contraindications   Pneumococcal Vaccine   * Pneumococcal conjugate vaccine = PCV13 (Prevnar 13), PCV15 (Vaxneuvance), PCV20 (Prevnar 20)  * Pneumococcal polysaccharide vaccine = PPSV23 (Pneumovax) Adults 19-63 yo with certain risk factors or if 65+ yo  If never received any pneumonia vaccine: recommend Prevnar 20 (PCV20)  Give PCV20 if previously received 1 dose of PCV13 or PPSV23   Hepatitis B Vaccine 3 dose series if at intermediate or high risk (ex: diabetes, end stage renal disease, liver disease)   Respiratory syncytial virus (RSV) Vaccine - COVERED BY MEDICARE PART D  * RSVPreF3 (Arexvy) CDC recommends that adults 60 years of age and older may receive a single dose of RSV vaccine using shared clinical decision-making (SCDM)   Tetanus (Td) Vaccine - COST NOT COVERED BY MEDICARE PART B Following completion of primary series, a booster dose should be given every 10 years to maintain immunity against tetanus. Td may also be given as tetanus wound prophylaxis.   Tdap Vaccine - COST NOT COVERED BY MEDICARE PART B Recommended at least once for all adults. For pregnant patients, recommended with each pregnancy.   Shingles Vaccine (Shingrix) - COST NOT COVERED BY MEDICARE PART B  2 shot series recommended in those 19 years and older who have or will have weakened immune systems or those 50 years and older     Health Maintenance Due:      Topic Date Due    Breast Cancer Screening: Mammogram  06/23/2024    Colorectal Cancer Screening  05/02/2026    Hepatitis C Screening  Completed     Immunizations Due:      Topic Date Due    Influenza Vaccine (1) 09/01/2024    COVID-19 Vaccine (5 - 2023-24 season) 09/01/2024     Advance Directives   What are advance directives?  Advance directives are legal documents that state your wishes and  plans for medical care. These plans are made ahead of time in case you lose your ability to make decisions for yourself. Advance directives can apply to any medical decision, such as the treatments you want, and if you want to donate organs.   What are the types of advance directives?  There are many types of advance directives, and each state has rules about how to use them. You may choose a combination of any of the following:  Living will:  This is a written record of the treatment you want. You can also choose which treatments you do not want, which to limit, and which to stop at a certain time. This includes surgery, medicine, IV fluid, and tube feedings.   Durable power of  for healthcare (DPAHC):  This is a written record that states who you want to make healthcare choices for you when you are unable to make them for yourself. This person, called a proxy, is usually a family member or a friend. You may choose more than 1 proxy.  Do not resuscitate (DNR) order:  A DNR order is used in case your heart stops beating or you stop breathing. It is a request not to have certain forms of treatment, such as CPR. A DNR order may be included in other types of advance directives.  Medical directive:  This covers the care that you want if you are in a coma, near death, or unable to make decisions for yourself. You can list the treatments you want for each condition. Treatment may include pain medicine, surgery, blood transfusions, dialysis, IV or tube feedings, and a ventilator (breathing machine).  Values history:  This document has questions about your views, beliefs, and how you feel and think about life. This information can help others choose the care that you would choose.  Why are advance directives important?  An advance directive helps you control your care. Although spoken wishes may be used, it is better to have your wishes written down. Spoken wishes can be misunderstood, or not followed. Treatments  may be given even if you do not want them. An advance directive may make it easier for your family to make difficult choices about your care.   Weight Management   Why it is important to manage your weight:  Being overweight increases your risk of health conditions such as heart disease, high blood pressure, type 2 diabetes, and certain types of cancer. It can also increase your risk for osteoarthritis, sleep apnea, and other respiratory problems. Aim for a slow, steady weight loss. Even a small amount of weight loss can lower your risk of health problems.  How to lose weight safely:  A safe and healthy way to lose weight is to eat fewer calories and get regular exercise. You can lose up about 1 pound a week by decreasing the number of calories you eat by 500 calories each day.   Healthy meal plan for weight management:  A healthy meal plan includes a variety of foods, contains fewer calories, and helps you stay healthy. A healthy meal plan includes the following:  Eat whole-grain foods more often.  A healthy meal plan should contain fiber. Fiber is the part of grains, fruits, and vegetables that is not broken down by your body. Whole-grain foods are healthy and provide extra fiber in your diet. Some examples of whole-grain foods are whole-wheat breads and pastas, oatmeal, brown rice, and bulgur.  Eat a variety of vegetables every day.  Include dark, leafy greens such as spinach, kale, samara greens, and mustard greens. Eat yellow and orange vegetables such as carrots, sweet potatoes, and winter squash.   Eat a variety of fruits every day.  Choose fresh or canned fruit (canned in its own juice or light syrup) instead of juice. Fruit juice has very little or no fiber.  Eat low-fat dairy foods.  Drink fat-free (skim) milk or 1% milk. Eat fat-free yogurt and low-fat cottage cheese. Try low-fat cheeses such as mozzarella and other reduced-fat cheeses.  Choose meat and other protein foods that are low in fat.  Choose  beans or other legumes such as split peas or lentils. Choose fish, skinless poultry (chicken or turkey), or lean cuts of red meat (beef or pork). Before you cook meat or poultry, cut off any visible fat.   Use less fat and oil.  Try baking foods instead of frying them. Add less fat, such as margarine, sour cream, regular salad dressing and mayonnaise to foods. Eat fewer high-fat foods. Some examples of high-fat foods include french fries, doughnuts, ice cream, and cakes.  Eat fewer sweets.  Limit foods and drinks that are high in sugar. This includes candy, cookies, regular soda, and sweetened drinks.  Exercise:  Exercise at least 30 minutes per day on most days of the week. Some examples of exercise include walking, biking, dancing, and swimming. You can also fit in more physical activity by taking the stairs instead of the elevator or parking farther away from stores. Ask your healthcare provider about the best exercise plan for you.      © Copyright [employer] 2018 Information is for End User's use only and may not be sold, redistributed or otherwise used for commercial purposes. All illustrations and images included in CareNotes® are the copyrighted property of [employer] or [employer]    AWV is UTD and needs advanced care planning. Labs reviewed and HTN stable and gerd stable. Anxiety stable and will rec losing weight as directed to get BMI lower than 25. Rec also to get advanced care planning done. Get mammogram and colon screening is UTD. Low sugar diet encouraged to get hga1c lower than 5.7.

## 2024-10-30 NOTE — PROGRESS NOTES
Ambulatory Visit  Name: Magda Bernabe      : 1955      MRN: [id number]  Encounter Provider: Adolph Apodaca DO  Encounter Date: 10/30/2024   Encounter department: [facility] PRIMARY CARE  Chief Complaint   Patient presents with    Medicare Wellness Visit     Patient Instructions   Medicare Preventive Visit Patient Instructions  Thank you for completing your Welcome to Medicare Visit or Medicare Annual Wellness Visit today. Your next wellness visit will be due in one year (10/31/2025).  The screening/preventive services that you may require over the next 5-10 years are detailed below. Some tests may not apply to you based off risk factors and/or age. Screening tests ordered at today's visit but not completed yet may show as past due. Also, please note that scanned in results may not display below.  Preventive Screenings:  Service Recommendations Previous Testing/Comments   Colorectal Cancer Screening  * Colonoscopy    * Fecal Occult Blood Test (FOBT)/Fecal Immunochemical Test (FIT)  * Fecal DNA/Cologuard Test  * Flexible Sigmoidoscopy Age: 45-75 years old   Colonoscopy: every 10 years (may be performed more frequently if at higher risk)  OR  FOBT/FIT: every 1 year  OR  Cologuard: every 3 years  OR  Sigmoidoscopy: every 5 years  Screening may be recommended earlier than age 45 if at higher risk for colorectal cancer. Also, an individualized decision between you and your healthcare provider will decide whether screening between the ages of 76-85 would be appropriate. Colonoscopy: Not on file  FOBT/FIT: Not on file  Cologuard: 2023  Sigmoidoscopy: Not on file    Screening Current     Breast Cancer Screening Age: 40+ years old  Frequency: every 1-2 years  Not required if history of left and right mastectomy Mammogram: 2023    Screening Current   Cervical Cancer Screening Between the ages of 21-29, pap smear recommended once every 3 years.   Between the ages of 30-65, can perform pap smear  with HPV co-testing every 5 years.   Recommendations may differ for women with a history of total hysterectomy, cervical cancer, or abnormal pap smears in past. Pap Smear: 05/21/2024    Screening Not Indicated   Hepatitis C Screening Once for adults born between 1945 and 1965  More frequently in patients at high risk for Hepatitis C Hep C Antibody: 07/27/2018    Screening Current   Diabetes Screening 1-2 times per year if you're at risk for diabetes or have pre-diabetes Fasting glucose: 89 mg/dL (10/24/2024)  A1C: 5.8 % (10/24/2024)  Screening Current   Cholesterol Screening Once every 5 years if you don't have a lipid disorder. May order more often based on risk factors. Lipid panel: 10/24/2024    Screening Not Indicated  History Lipid Disorder     Other Preventive Screenings Covered by Medicare:  Abdominal Aortic Aneurysm (AAA) Screening: covered once if your at risk. You're considered to be at risk if you have a family history of AAA.  Lung Cancer Screening: covers low dose CT scan once per year if you meet all of the following conditions: (1) Age 55-77; (2) No signs or symptoms of lung cancer; (3) Current smoker or have quit smoking within the last 15 years; (4) You have a tobacco smoking history of at least 20 pack years (packs per day multiplied by number of years you smoked); (5) You get a written order from a healthcare provider.  Glaucoma Screening: covered annually if you're considered high risk: (1) You have diabetes OR (2) Family history of glaucoma OR (3)  aged 50 and older OR (4)  American aged 65 and older  Osteoporosis Screening: covered every 2 years if you meet one of the following conditions: (1) You're estrogen deficient and at risk for osteoporosis based off medical history and other findings; (2) Have a vertebral abnormality; (3) On glucocorticoid therapy for more than 3 months; (4) Have primary hyperparathyroidism; (5) On osteoporosis medications and need to assess  response to drug therapy.   Last bone density test (DXA Scan): 06/29/2023.  HIV Screening: covered annually if you're between the age of 15-65. Also covered annually if you are younger than 15 and older than 65 with risk factors for HIV infection. For pregnant patients, it is covered up to 3 times per pregnancy.    Immunizations:  Immunization Recommendations   Influenza Vaccine Annual influenza vaccination during flu season is recommended for all persons aged >= 6 months who do not have contraindications   Pneumococcal Vaccine   * Pneumococcal conjugate vaccine = PCV13 (Prevnar 13), PCV15 (Vaxneuvance), PCV20 (Prevnar 20)  * Pneumococcal polysaccharide vaccine = PPSV23 (Pneumovax) Adults 19-63 yo with certain risk factors or if 65+ yo  If never received any pneumonia vaccine: recommend Prevnar 20 (PCV20)  Give PCV20 if previously received 1 dose of PCV13 or PPSV23   Hepatitis B Vaccine 3 dose series if at intermediate or high risk (ex: diabetes, end stage renal disease, liver disease)   Respiratory syncytial virus (RSV) Vaccine - COVERED BY MEDICARE PART D  * RSVPreF3 (Arexvy) CDC recommends that adults 60 years of age and older may receive a single dose of RSV vaccine using shared clinical decision-making (SCDM)   Tetanus (Td) Vaccine - COST NOT COVERED BY MEDICARE PART B Following completion of primary series, a booster dose should be given every 10 years to maintain immunity against tetanus. Td may also be given as tetanus wound prophylaxis.   Tdap Vaccine - COST NOT COVERED BY MEDICARE PART B Recommended at least once for all adults. For pregnant patients, recommended with each pregnancy.   Shingles Vaccine (Shingrix) - COST NOT COVERED BY MEDICARE PART B  2 shot series recommended in those 19 years and older who have or will have weakened immune systems or those 50 years and older     Health Maintenance Due:      Topic Date Due    Breast Cancer Screening: Mammogram  06/23/2024    Colorectal Cancer Screening   05/02/2026    Hepatitis C Screening  Completed     Immunizations Due:      Topic Date Due    Influenza Vaccine (1) 09/01/2024    COVID-19 Vaccine (5 - 2023-24 season) 09/01/2024     Advance Directives   What are advance directives?  Advance directives are legal documents that state your wishes and plans for medical care. These plans are made ahead of time in case you lose your ability to make decisions for yourself. Advance directives can apply to any medical decision, such as the treatments you want, and if you want to donate organs.   What are the types of advance directives?  There are many types of advance directives, and each state has rules about how to use them. You may choose a combination of any of the following:  Living will:  This is a written record of the treatment you want. You can also choose which treatments you do not want, which to limit, and which to stop at a certain time. This includes surgery, medicine, IV fluid, and tube feedings.   Durable power of  for healthcare (DPAHC):  This is a written record that states who you want to make healthcare choices for you when you are unable to make them for yourself. This person, called a proxy, is usually a family member or a friend. You may choose more than 1 proxy.  Do not resuscitate (DNR) order:  A DNR order is used in case your heart stops beating or you stop breathing. It is a request not to have certain forms of treatment, such as CPR. A DNR order may be included in other types of advance directives.  Medical directive:  This covers the care that you want if you are in a coma, near death, or unable to make decisions for yourself. You can list the treatments you want for each condition. Treatment may include pain medicine, surgery, blood transfusions, dialysis, IV or tube feedings, and a ventilator (breathing machine).  Values history:  This document has questions about your views, beliefs, and how you feel and think about life. This  information can help others choose the care that you would choose.  Why are advance directives important?  An advance directive helps you control your care. Although spoken wishes may be used, it is better to have your wishes written down. Spoken wishes can be misunderstood, or not followed. Treatments may be given even if you do not want them. An advance directive may make it easier for your family to make difficult choices about your care.   Weight Management   Why it is important to manage your weight:  Being overweight increases your risk of health conditions such as heart disease, high blood pressure, type 2 diabetes, and certain types of cancer. It can also increase your risk for osteoarthritis, sleep apnea, and other respiratory problems. Aim for a slow, steady weight loss. Even a small amount of weight loss can lower your risk of health problems.  How to lose weight safely:  A safe and healthy way to lose weight is to eat fewer calories and get regular exercise. You can lose up about 1 pound a week by decreasing the number of calories you eat by 500 calories each day.   Healthy meal plan for weight management:  A healthy meal plan includes a variety of foods, contains fewer calories, and helps you stay healthy. A healthy meal plan includes the following:  Eat whole-grain foods more often.  A healthy meal plan should contain fiber. Fiber is the part of grains, fruits, and vegetables that is not broken down by your body. Whole-grain foods are healthy and provide extra fiber in your diet. Some examples of whole-grain foods are whole-wheat breads and pastas, oatmeal, brown rice, and bulgur.  Eat a variety of vegetables every day.  Include dark, leafy greens such as spinach, kale, samara greens, and mustard greens. Eat yellow and orange vegetables such as carrots, sweet potatoes, and winter squash.   Eat a variety of fruits every day.  Choose fresh or canned fruit (canned in its own juice or light syrup) instead  of juice. Fruit juice has very little or no fiber.  Eat low-fat dairy foods.  Drink fat-free (skim) milk or 1% milk. Eat fat-free yogurt and low-fat cottage cheese. Try low-fat cheeses such as mozzarella and other reduced-fat cheeses.  Choose meat and other protein foods that are low in fat.  Choose beans or other legumes such as split peas or lentils. Choose fish, skinless poultry (chicken or turkey), or lean cuts of red meat (beef or pork). Before you cook meat or poultry, cut off any visible fat.   Use less fat and oil.  Try baking foods instead of frying them. Add less fat, such as margarine, sour cream, regular salad dressing and mayonnaise to foods. Eat fewer high-fat foods. Some examples of high-fat foods include french fries, doughnuts, ice cream, and cakes.  Eat fewer sweets.  Limit foods and drinks that are high in sugar. This includes candy, cookies, regular soda, and sweetened drinks.  Exercise:  Exercise at least 30 minutes per day on most days of the week. Some examples of exercise include walking, biking, dancing, and swimming. You can also fit in more physical activity by taking the stairs instead of the elevator or parking farther away from stores. Ask your healthcare provider about the best exercise plan for you.      © Copyright [employer] 2018 Information is for End User's use only and may not be sold, redistributed or otherwise used for commercial purposes. All illustrations and images included in CareNotes® are the copyrighted property of [employer] or [employer]    AWV is UTD and needs advanced care planning. Labs reviewed and HTN stable and gerd stable. Anxiety stable and will rec losing weight as directed to get BMI lower than 25. Rec also to get advanced care planning done. Get mammogram and colon screening is UTD. Low sugar diet encouraged to get hga1c lower than 5.7.     Assessment & Plan  Medicare annual wellness visit, subsequent         Health care maintenance          Gastroesophageal reflux disease, unspecified whether esophagitis present         Primary hypertension         Hyperlipidemia, unspecified hyperlipidemia type         Obesity (BMI 30-39.9)           Anxiety         Prediabetes         Encounter for immunization    Orders:    influenza vaccine, high-dose, PF 0.5 mL (Fluzone High Dose)       Preventive health issues were discussed with patient, and age appropriate screening tests were ordered as noted in patient's After Visit Summary. Personalized health advice and appropriate referrals for health education or preventive services given if needed, as noted in patient's After Visit Summary.    History of Present Illness     Here for AWV and is UTD and needs advanced care planning.        Patient Care Team:  Adolph Apodaca DO as PCP - General  DO Fish Moody DO    Review of Systems   Constitutional: Negative.    HENT: Negative.     Eyes: Negative.    Respiratory: Negative.     Cardiovascular: Negative.    Gastrointestinal: Negative.    Endocrine: Negative.    Genitourinary: Negative.    Musculoskeletal: Negative.    Skin: Negative.    Allergic/Immunologic: Negative.    Neurological: Negative.    Hematological: Negative.    Psychiatric/Behavioral: Negative.       Medical History Reviewed by provider this encounter:  Tobacco  Allergies  Meds  Problems  Med Hx  Surg Hx  Fam Hx       Annual Wellness Visit Questionnaire   Magda is here for her Subsequent Wellness visit.     Health Risk Assessment:   Patient rates overall health as good. Patient feels that their physical health rating is same. Patient is satisfied with their life. Eyesight was rated as slightly worse. Hearing was rated as same. Patient feels that their emotional and mental health rating is same. Patients states they are sometimes angry. Patient states they are sometimes unusually tired/fatigued. Pain experienced in the last 7 days has been some. Patient's pain rating has  been 4/10. Patient states that she has experienced no weight loss or gain in last 6 months.     Depression Screening:   PHQ-2 Score: 1      Fall Risk Screening:   In the past year, patient has experienced: no history of falling in past year      Urinary Incontinence Screening:   Patient has not leaked urine accidently in the last six months.     Home Safety:  Patient does not have trouble with stairs inside or outside of their home. Patient has working smoke alarms and has working carbon monoxide detector. Home safety hazards include: none.     Nutrition:   Current diet is Regular.     Medications:   Patient is currently taking over-the-counter supplements. OTC medications include: see medication list. Patient is able to manage medications.     Activities of Daily Living (ADLs)/Instrumental Activities of Daily Living (IADLs):   Walk and transfer into and out of bed and chair?: Yes  Dress and groom yourself?: Yes    Bathe or shower yourself?: Yes    Feed yourself? Yes  Do your laundry/housekeeping?: Yes  Manage your money, pay your bills and track your expenses?: Yes  Make your own meals?: Yes    Do your own shopping?: Yes    Previous Hospitalizations:   Any hospitalizations or ED visits within the last 12 months?: No      Advance Care Planning:   Living will: No    Durable POA for healthcare: No    Advanced directive: No      Comments: Needs to get advanced care planning.     Cognitive Screening:   Provider or family/friend/caregiver concerned regarding cognition?: No    PREVENTIVE SCREENINGS      Cardiovascular Screening:    General: Screening Not Indicated and History Lipid Disorder      Diabetes Screening:     General: Screening Current      Colorectal Cancer Screening:     General: Screening Current      Breast Cancer Screening:     General: Screening Current      Cervical Cancer Screening:    General: Screening Not Indicated      Lung Cancer Screening:     General: Screening Not Indicated      Hepatitis C  "Screening:    General: Screening Current    Screening, Brief Intervention, and Referral to Treatment (SBIRT)    Screening  Typical number of drinks in a day: 0  Typical number of drinks in a week: 0  Interpretation: Low risk drinking behavior.    Single Item Drug Screening:  How often have you used an illegal drug (including marijuana) or a prescription medication for non-medical reasons in the past year? never    Single Item Drug Screen Score: 0  Interpretation: Negative screen for possible drug use disorder    Social Determinants of Health     Food Insecurity: No Food Insecurity (10/30/2024)    Hunger Vital Sign     Worried About Running Out of Food in the Last Year: Never true     Ran Out of Food in the Last Year: Never true   Transportation Needs: No Transportation Needs (10/30/2024)    PRAPARE - Transportation     Lack of Transportation (Medical): No     Lack of Transportation (Non-Medical): No   Housing Stability: Low Risk  (10/30/2024)    Housing Stability Vital Sign     Unable to Pay for Housing in the Last Year: No     Number of Times Moved in the Last Year: 0     Homeless in the Last Year: No   Utilities: Not At Risk (10/30/2024)    [facility] Utilities     Threatened with loss of utilities: No     No results found.    Objective     /80   Pulse 86   Temp (!) 97.4 °F (36.3 °C) (Temporal)   Resp 16   Ht 5' 4\" (1.626 m)   Wt 87.5 kg (193 lb)   SpO2 97%   BMI 33.13 kg/m²     Physical Exam  Constitutional:       Appearance: She is well-developed. She is obese.   HENT:      Head: Normocephalic and atraumatic.      Right Ear: External ear normal.      Left Ear: External ear normal.      Nose: Nose normal.   Eyes:      Conjunctiva/sclera: Conjunctivae normal.      Pupils: Pupils are equal, round, and reactive to light.   Cardiovascular:      Rate and Rhythm: Normal rate and regular rhythm.      Pulses: Normal pulses.      Heart sounds: Normal heart sounds.   Pulmonary:      Effort: Pulmonary effort is " normal.      Breath sounds: Normal breath sounds.   Musculoskeletal:         General: Normal range of motion.      Cervical back: Normal range of motion and neck supple.   Skin:     General: Skin is warm and dry.      Capillary Refill: Capillary refill takes less than 2 seconds.   Neurological:      General: No focal deficit present.      Mental Status: She is alert and oriented to person, place, and time. Mental status is at baseline.      Deep Tendon Reflexes: Reflexes are normal and symmetric.   Psychiatric:         Mood and Affect: Mood normal.         Behavior: Behavior normal.         Thought Content: Thought content normal.         Judgment: Judgment normal.       Administrative Statements   I have spent a total time of 30 minutes in caring for this patient on the day of the visit/encounter including Diagnostic results, Prognosis, Risks and benefits of tx options, Instructions for management, Patient and family education, Importance of tx compliance, Risk factor reductions, Impressions, Counseling / Coordination of care, Documenting in the medical record, Reviewing / ordering tests, medicine, procedures  , and Obtaining or reviewing history  .

## 2024-11-14 DIAGNOSIS — E78.5 HYPERLIPIDEMIA, UNSPECIFIED HYPERLIPIDEMIA TYPE: ICD-10-CM

## 2024-11-14 RX ORDER — EZETIMIBE 10 MG/1
10 TABLET ORAL DAILY
Qty: 90 TABLET | Refills: 1 | Status: SHIPPED | OUTPATIENT
Start: 2024-11-14

## 2025-01-15 DIAGNOSIS — I10 PRIMARY HYPERTENSION: ICD-10-CM

## 2025-01-16 RX ORDER — LISINOPRIL AND HYDROCHLOROTHIAZIDE 10; 12.5 MG/1; MG/1
1 TABLET ORAL DAILY
Qty: 90 TABLET | Refills: 1 | Status: SHIPPED | OUTPATIENT
Start: 2025-01-16

## 2025-01-19 NOTE — TELEPHONE ENCOUNTER
RENAL PROGRESS NOTE    ASSESSMENT & PLAN:   Chronic kidney disease stage 5, ?ESKD - With history of HAMMAD diana-operatively with cardiac surgery in late August/early September 2024 with CRRT 8/29/24-9/8/24 then transitioned to hemodialysis. Was on intermittent hemodialysis until 1/2/25, has been on trial of holding dialysis but patient reported increased uremic symptoms thus resumed dialysis 1/14/25. Considering twice weekly dialysis as outpatient and trending. Dialyzing via right CVC. Has outpatient placement at [facility] on MWF;will plan to dialyze on Monday-Friday schedule while here     Volume - Appears euvolemic on exam; historically has not needed UF with dialysis    Pulmonary embolism - Planning 3-6 months warfarin; being bridged with heparin until therapeutic    Nausea/vomiting - Unclear etiology. Initially thought to be uremic but has not improved with resuming dialysis. No acute pathology on CT. Appears worst in the morning, ?component of reflux. Trialing changing pantoprazole dosing to evening and added famotidine    Chronic hypotension - On midodrine. Torsemide on hold    Anemia - Of chronic disease, infection/inflammation. Hemoglobin has been 10-11    Respiratory failure, pneumonia - On oxygen as needed (chronically at night). Receiving antibiotics, supportive therapies    Coronary artery disease, atrial fibrillation - S/p CABG, valve surgery in 2024    Hyperlipidemia - On statin    H/o endocarditis complicated by surgical MVR/AVR (8/2024)    H/o sick sinus syndrome s/p pacemaker placement    Pre-diabetes    SUBJECTIVE:  Working with therapy, no new complaints or issues    OBJECTIVE:  Physical Exam   Temp: 98.8  F (37.1  C) Temp src: Oral BP: 101/57 Pulse: 70   Resp: 16 SpO2: 95 % O2 Device: Nasal cannula Oxygen Delivery: 1 LPM  Vitals:    01/14/25 0501 01/18/25 0631 01/19/25 0420   Weight: 65.2 kg (143 lb 11.8 oz) 67.3 kg (148 lb 5.9 oz) 65.9 kg (145 lb 4.5 oz)     Vital Signs with  Pt scheduled 4/19/2023 Ranges  Temp:  [98.3  F (36.8  C)-98.8  F (37.1  C)] 98.8  F (37.1  C)  Pulse:  [69-76] 70  Resp:  [15-16] 16  BP: ()/(57-68) 101/57  SpO2:  [95 %-97 %] 95 %  I/O last 3 completed shifts:  In: 100 [P.O.:100]  Out: -         Patient Vitals for the past 72 hrs:   Weight   01/19/25 0420 65.9 kg (145 lb 4.5 oz)   01/18/25 0631 67.3 kg (148 lb 5.9 oz)       Intake/Output Summary (Last 24 hours) at 1/16/2025 0927  Last data filed at 1/16/2025 0035  Gross per 24 hour   Intake 288 ml   Output 0 ml   Net 288 ml       PHYSICAL EXAM:  General - Alert and appears comfortable  Cardiovascular - Normal S1S2, no rub  Respiratory - Clear to auscultation bilaterally, no crackles or wheezes  Abdomen - Soft, non-tender, bowel sounds present.    Extremities - No lower extremity edema bilaterally  Integumentary - Warm, dry, no rash  Neurologic - Grossly intact, no focal deficits      LABORATORY STUDIES:     Recent Labs   Lab 01/19/25  0617 01/19/25  0146 01/17/25  0847 01/14/25  1530 01/14/25  0808 01/13/25  1023   WBC  --   --  6.4 8.5 9.4 14.5*   RBC  --   --  3.85 3.70* 3.76* 4.42   HGB 9.4*  --  10.6* 10.0* 10.8* 12.3   HCT  --   --  35.2 33.1* 34.6* 39.3   PLT  --  150 145* 108* 115* 122*       Basic Metabolic Panel:  Recent Labs   Lab 01/19/25  0617 01/18/25  0751 01/17/25  0847 01/16/25  0836 01/15/25  0749 01/14/25  0808    137 137 139 135 140   POTASSIUM 3.4 3.6 3.1* 3.7 3.6 3.6   CHLORIDE 103 102 100 102 101 99   CO2 26 25 24 25 23 28   BUN 10.8 7.2* 21.8 24.2* 24.8* 45.3*   CR 2.33* 1.73* 2.65* 2.70* 2.54* 3.71*   GLC 89 103* 98 89 85 79   KAELYN 9.3 9.1 9.8 9.6 9.0 9.3       Recent Labs   Lab Test 01/19/25  0617 01/19/25  0146 01/18/25  0751 01/17/25  0847 01/15/25  0538 01/14/25  1530   INR 2.48*  --  1.87* 1.41*   < >  --    WBC  --   --   --  6.4  --  8.5   HGB 9.4*  --   --  10.6*  --  10.0*   PLT  --  150  --  145*  --  108*    < > = values in this interval not displayed.         Personally reviewed current  labs      Tami Jacinto PA-C  [facility]  [phone]

## 2025-01-30 ENCOUNTER — VBI (OUTPATIENT)
Dept: ADMINISTRATIVE | Facility: OTHER | Age: 70
End: 2025-01-30

## 2025-01-30 NOTE — TELEPHONE ENCOUNTER
01/30/25 1:24 PM     Chart reviewed for Blood Pressure was/were submitted to the patient's insurance.     Nohemi Salazar MA   [facility]

## 2025-03-14 DIAGNOSIS — I47.10 PAROXYSMAL SVT (SUPRAVENTRICULAR TACHYCARDIA) (HCC): ICD-10-CM

## 2025-03-14 DIAGNOSIS — R00.2 PALPITATIONS: ICD-10-CM

## 2025-03-14 RX ORDER — METOPROLOL SUCCINATE 25 MG/1
TABLET, EXTENDED RELEASE ORAL
Qty: 90 TABLET | Refills: 1 | Status: SHIPPED | OUTPATIENT
Start: 2025-03-14

## 2025-03-18 ENCOUNTER — VBI (OUTPATIENT)
Dept: ADMINISTRATIVE | Facility: OTHER | Age: 70
End: 2025-03-18

## 2025-03-18 NOTE — TELEPHONE ENCOUNTER
03/18/25 10:50 AM     Chart reviewed for Blood Pressure was/were submitted to the patient's insurance.     Nohemi Salazar MA   [facility]

## 2025-04-01 ENCOUNTER — HOSPITAL ENCOUNTER (OUTPATIENT)
Dept: MAMMOGRAPHY | Facility: MEDICAL CENTER | Age: 70
Discharge: HOME | End: 2025-04-01
Payer: COMMERCIAL

## 2025-04-01 VITALS — HEIGHT: 64 IN | WEIGHT: 193 LBS | BODY MASS INDEX: 32.95 KG/M2

## 2025-04-01 DIAGNOSIS — Z12.31 ENCOUNTER FOR SCREENING MAMMOGRAM FOR BREAST CANCER: ICD-10-CM

## 2025-04-01 PROCEDURE — 77063 BREAST TOMOSYNTHESIS BI: CPT

## 2025-04-01 PROCEDURE — 77067 SCR MAMMO BI INCL CAD: CPT

## 2025-04-24 ENCOUNTER — RA CDI HCC (OUTPATIENT)
Dept: OTHER | Facility: HOSPITAL | Age: 70
End: 2025-04-24

## 2025-04-30 ENCOUNTER — OFFICE VISIT (OUTPATIENT)
Dept: FAMILY MEDICINE CLINIC | Facility: CLINIC | Age: 70
End: 2025-04-30
Payer: COMMERCIAL

## 2025-04-30 VITALS
BODY MASS INDEX: 32.95 KG/M2 | OXYGEN SATURATION: 98 % | DIASTOLIC BLOOD PRESSURE: 80 MMHG | HEIGHT: 64 IN | SYSTOLIC BLOOD PRESSURE: 126 MMHG | WEIGHT: 193 LBS | TEMPERATURE: 98.6 F | HEART RATE: 95 BPM

## 2025-04-30 DIAGNOSIS — K21.9 GASTROESOPHAGEAL REFLUX DISEASE, UNSPECIFIED WHETHER ESOPHAGITIS PRESENT: ICD-10-CM

## 2025-04-30 DIAGNOSIS — R73.03 PREDIABETES: ICD-10-CM

## 2025-04-30 DIAGNOSIS — I10 PRIMARY HYPERTENSION: Primary | ICD-10-CM

## 2025-04-30 DIAGNOSIS — S20.362A TICK BITE OF LEFT FRONT WALL OF THORAX, INITIAL ENCOUNTER: ICD-10-CM
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
## 2025-04-30 DIAGNOSIS — E78.5 HYPERLIPIDEMIA, UNSPECIFIED HYPERLIPIDEMIA TYPE: ICD-10-CM

## 2025-04-30 PROCEDURE — 99214 OFFICE O/P EST MOD 30 MIN: CPT | Performed by: FAMILY MEDICINE

## 2025-04-30 PROCEDURE — G2211 COMPLEX E/M VISIT ADD ON: HCPCS | Performed by: FAMILY MEDICINE

## 2025-04-30 NOTE — ASSESSMENT & PLAN NOTE
Low cholesterol diet encouraged  Orders:    Comprehensive metabolic panel; Future    Lipid Panel with Direct LDL reflex; Future

## 2025-04-30 NOTE — PATIENT INSTRUCTIONS
Here for f-up tick bite and was treated with 1 day of Doxycycline and will rec lyme titer in the next 2 weeks. Currently no symptoms. Recheck in 6 months with labs for prediabetes and check routine labs.

## 2025-04-30 NOTE — ASSESSMENT & PLAN NOTE
Low sugar diet and avoid processed foods  Orders:    Comprehensive metabolic panel; Future    Hemoglobin A1C

## 2025-04-30 NOTE — PROGRESS NOTES
Name: Magda Bernabe      : 1955      MRN: [id number]  Encounter Provider: Adolph Apodaca DO  Encounter Date: 2025   Encounter department: [facility]  Chief Complaint   Patient presents with    Follow-up    Tick bite     Pt want is requesting blood work due to bite under her left breast.      Patient Instructions   Here for f-up tick bite and was treated with 1 day of Doxycycline and will rec lyme titer in the next 2 weeks. Currently no symptoms. Recheck in 6 months with labs for prediabetes and check routine labs.       Assessment & Plan  Primary hypertension  Stable on med  Orders:    Comprehensive metabolic panel; Future    Gastroesophageal reflux disease, unspecified whether esophagitis present  Stable on med prn  Orders:    CBC and differential; Future    Hyperlipidemia, unspecified hyperlipidemia type  Low cholesterol diet encouraged  Orders:    Comprehensive metabolic panel; Future    Lipid Panel with Direct LDL reflex; Future    Prediabetes  Low sugar diet and avoid processed foods  Orders:    Comprehensive metabolic panel; Future    Hemoglobin A1C    Tick bite of left front wall of thorax, initial encounter  Check lyme titer and was treated for 1 day with Doxycycline 1 month ago.   Orders:    Lyme Total AB W Reflex to IGM/IGG; Future      Depression Screening and Follow-up Plan: Patient was screened for depression during today's encounter. They screened negative with a PHQ-2 score of 0.          History of Present Illness     Follow-up  Tick bite (Pt want is requesting blood work due to bite under her left breast. )        Discharge summary reviewed from recent visit:    Subjective:   HPI    Was working at the garden 2 days ago,   Notice something similar to skin tag while shower today at left breast  She pulled it off notice a gorged tick.   No fever, no chills  No joint pain  + small skin ariel at the left breast  But no bull eye's rash noted         The following  portions of the patient's history were reviewed and updated as appropriate:   Patient Active Problem List   Diagnosis   Radiculopathy, lumbar region   Post laminectomy syndrome   Chronic pain syndrome   Multiple drug allergies   High risk medications (not anticoagulants) long-term use   Encounter for monitoring opioid maintenance therapy   Osteoarthritis of left hip   Medical marijuana use   Lumbar postlaminectomy syndrome   Primary osteoarthritis of left foot     She has a past medical history of GERD (gastroesophageal reflux disease), Hypertension, Migraine, and MRSA (methicillin resistant Staphylococcus aureus).  She has a past surgical history that includes Back surgery; Cholecystectomy; Hernia repair; and Bladder suspension.  Her family history is not on file.  She reports that she has never smoked. She has never used smokeless tobacco. She reports that she does not drink alcohol and does not use drugs.  Other Topics Concern   Seat Belt Yes   Bike Helmet Not Asked   Blood Transfusions Not Asked   Special Diet Not Asked   Exercise Not Asked    Service Not Asked   Sleep Concern Not Asked   Stress Concern Not Asked   Weight Concern Not Asked   Breast Self-Exams Not Asked   [employer] Member Not Asked   Hobbies/Activities Not Asked   Sun Exposure Not Asked   Guns at Home Not Asked   Smoke Detectors at home Yes   Carbon Monoxide Detectors at home Not Asked   Radon in the house? Not Asked   Dental Care Not Asked   Violence in the home Not Asked   HIV Screening Not Asked   Bike Helmet Not Asked   Safe at home Not Asked   Seat Belt Yes   Exercise Not Asked   Blood Transfusions Not Asked   Special Diet Not Asked   Have tattoos Not Asked   Caffeine Concern Not Asked   Travel outside [place] Not Asked   Lives alone Not Asked    Service Not Asked     Current Outpatient Medications   Medication Sig   acetaminophen (TYLENOL) 500 MG tablet Take 1 tablet (500 mg total) by mouth every 6 (six) hours as needed for  mild pain (pain score 1-3).   cholecalciferol, vitamin D3, 25 mcg (1,000 unit) chew Take by mouth.   cyclobenzaprine (FLEXERIL) 10 MG tablet Take 1 tablet (10 mg total) by mouth 2 (two) times a day as needed for muscle spasms.   doxycycline (ADOXA) 100 MG tablet Take 1 tablet (100 mg total) by mouth 2 (two) times a day for 1 day.   ezetimibe (ZETIA) 10 mg tablet Take 1 tablet (10 mg total) by mouth.   famotidine (PEPCID) 20 MG tablet daily.   fexofenadine (ALLEGRA) 60 MG tablet Take 1 tablet (60 mg total) by mouth daily.   lidocaine (LIDODERM) 5 % 12 hours on and 12 hours off   lisinopril-hydrochlorothiazide (PRINZIDE,ZESTORETIC) 20-12.5 mg per tablet Take by mouth daily.   magnesium 30 mg tablet Take 1 tablet (30 mg total) by mouth 2 (two) times a day.   nortriptyline (PAMELOR) 10 MG capsule Take 1 capsule (10 mg total) by mouth nightly.   pantoprazole (PROTONIX) 40 MG tablet Take 1 tablet (40 mg total) by mouth daily.     No current facility-administered medications for this visit.     Current Outpatient Medications on File Prior to Visit   Medication Sig   acetaminophen (TYLENOL) 500 MG tablet Take 1 tablet (500 mg total) by mouth every 6 (six) hours as needed for mild pain (pain score 1-3).   cholecalciferol, vitamin D3, 25 mcg (1,000 unit) chew Take by mouth.   cyclobenzaprine (FLEXERIL) 10 MG tablet Take 1 tablet (10 mg total) by mouth 2 (two) times a day as needed for muscle spasms.   ezetimibe (ZETIA) 10 mg tablet Take 1 tablet (10 mg total) by mouth.   famotidine (PEPCID) 20 MG tablet daily.   fexofenadine (ALLEGRA) 60 MG tablet Take 1 tablet (60 mg total) by mouth daily.   lidocaine (LIDODERM) 5 % 12 hours on and 12 hours off   lisinopril-hydrochlorothiazide (PRINZIDE,ZESTORETIC) 20-12.5 mg per tablet Take by mouth daily.   magnesium 30 mg tablet Take 1 tablet (30 mg total) by mouth 2 (two) times a day.   nortriptyline (PAMELOR) 10 MG capsule Take 1 capsule (10 mg total) by mouth nightly.   pantoprazole  (PROTONIX) 40 MG tablet Take 1 tablet (40 mg total) by mouth daily.   [DISCONTINUED] predniSONE (DELTASONE) 10 MG tablet Take 6 po X 2 days, then 5 po x 2 days,4 po x 2 days, 3 po x 2 days, 2 po x 2 days, 1 po x 2 days     No current facility-administered medications on file prior to visit.     She is allergic to belbuca [buprenorphine hcl], diazepam, lyrica [pregabalin], percocet [oxycodone-acetaminophen], tramadol, and nucynta [tapentadol]..    Review of Systems   Skin: Positive for rash (at left breast).       Objective:   There were no vitals taken for this visit.  Physical Exam  Constitutional:   General: She is not in acute distress.  Neurological:   Mental Status: She is alert and oriented to person, place, and time.   Skin:  Findings: Rash (small at left breast) present.         Assessment/Plan:   Tick bite of left female breast with infection, initial encounter  - doxycycline (ADOXA) 100 MG tablet; Take 1 tablet (100 mg total) by mouth 2 (two) times a day for 1 day.          Review of Systems   Constitutional: Negative.    HENT: Negative.     Eyes: Negative.    Respiratory: Negative.     Cardiovascular: Negative.    Gastrointestinal: Negative.    Endocrine: Negative.    Genitourinary: Negative.    Musculoskeletal: Negative.    Skin:         Follow-up  Tick bite (Pt want is requesting blood work due to bite under her left breast. )     Allergic/Immunologic: Negative.    Neurological: Negative.    Hematological: Negative.    Psychiatric/Behavioral: Negative.       Past Medical History:   Diagnosis Date    Arthritis     Asthma     childhood    Chronic narcotic dependence (HCC)     Chronic pain disorder     low back- 2 back surgeries in past    Cystocele with rectocele     Diverticulosis     GERD (gastroesophageal reflux disease)     Herpes No idea    Hx MRSA infection     2014- left thigh after surgery for melanoma    Hyperlipidemia     Hypertension     Malignant melanoma (HCC)     left thigh/ right shoulder  2014    Migraines     Premature ventricular contraction     Last Assessed:  8/5/13    Uterovaginal prolapse     Wears partial dentures     upper     Past Surgical History:   Procedure Laterality Date    BACK SURGERY      lumbar x 2    BUNIONECTOMY Bilateral     CHOLECYSTECTOMY      COLONOSCOPY      Complete    ESOPHAGOGASTRODUODENOSCOPY      Diagnostic    INCISIONAL HERNIA REPAIR      ID COLPOPEXY VAGINAL EXTRAPERITONEAL APPROACH N/A 1/28/2020    Procedure: VE COLPOSUSPENSION (ENPLACE) with posterior repair;  Surgeon: Ez Enriquez MD;  Location: [facility];  Service: UroGynecology           ID CYSTOURETHROSCOPY N/A 1/28/2020    Procedure: CYSTOSCOPY;  Surgeon: Ez Enriquez MD;  Location: [facility];  Service: UroGynecology           ID SLING OPERATION STRESS INCONTINENCE N/A 1/28/2020    Procedure: SINGLE INCISION SLING;  Surgeon: Ez Enriquez MD;  Location: [facility];  Service: UroGynecology           SKIN CANCER EXCISION      melanoma left thigh / right shoulder     Family History   Problem Relation Age of Onset    Diabetes type II Mother     Hyperlipidemia Mother     Stomach cancer Father 70    Esophageal cancer Father 70    Hypertension Father     Migraines Father     No Known Problems Sister     No Known Problems Sister     No Known Problems Daughter     No Known Problems Maternal Grandmother     No Known Problems Maternal Grandfather     No Known Problems Paternal Grandmother     Prostate cancer Paternal Grandfather 80    Lung cancer Paternal Uncle 70    Brain cancer Paternal Uncle 60    No Known Problems Maternal Aunt     No Known Problems Maternal Aunt     No Known Problems Maternal Aunt     No Known Problems Maternal Aunt     No Known Problems Paternal Aunt      Social History     Tobacco Use    Smoking status: Never     Passive exposure: Never    Smokeless tobacco: Never    Tobacco comments:     Per allscripts:  Never a smoker/Unknown if every smoked   Vaping Use    Vaping status: Never Used    Substance and Sexual Activity    Alcohol use: Yes     Comment: wine/ liquor, rarely    Drug use: No    Sexual activity: Not Currently     Partners: Male     Birth control/protection: Post-menopausal     Current Outpatient Medications on File Prior to Visit   Medication Sig    acetaminophen (TYLENOL) 500 mg tablet Take 1 tablet (500 mg total) by mouth every 6 (six) hours as needed for mild pain    albuterol (PROVENTIL HFA,VENTOLIN HFA) 90 mcg/act inhaler Inhale 2 puffs every 4 (four) hours as needed for wheezing    ascorbic acid (VITAMIN C) 250 mg tablet Take 750 mg by mouth daily    aspirin 81 mg chewable tablet Chew 1 tablet (81 mg total) daily    Cholecalciferol 1000 units CHEW Chew daily     Cyanocobalamin (Vitamin B-12) 1000 MCG SUBL Place under the tongue    cyclobenzaprine (FLEXERIL) 10 mg tablet TAKE 1 TABLET (10 MG TOTAL) BY MOUTH 2 (TWO) TIMES A DAY AS NEEDED FOR MUSCLE SPASMS.    estradiol (ESTRACE VAGINAL) 0.1 mg/g vaginal cream Insert 1 g into the vagina 2 (two) times a week    ezetimibe (ZETIA) 10 mg tablet TAKE 1 TABLET BY MOUTH EVERY DAY    Fexofenadine HCl (ALLEGRA PO) Take by mouth Once a day as needed    lidocaine (LIDODERM) 5 % APPLY 1 PATCH 12 HOURS ON AND 12 HOURS OFF    lisinopril-hydrochlorothiazide (PRINZIDE,ZESTORETIC) 10-12.5 MG per tablet TAKE 1 TABLET BY MOUTH EVERY DAY    MAGNESIUM GLUCONATE PO Take 200 mg by mouth daily    meclizine (ANTIVERT) 25 mg tablet Take 1 tablet (25 mg total) by mouth daily as needed for dizziness or nausea    metoprolol succinate (TOPROL-XL) 25 mg 24 hr tablet TAKE 1/2 OF A TABLET (12.5 MG TOTAL) BY MOUTH TWICE A DAY    Misc Natural Products (ELDERBERRY IMMUNE COMPLEX PO) Take by mouth    Multiple Vitamin (MULTIVITAMIN) capsule Take 1 capsule by mouth daily      nortriptyline (PAMELOR) 10 mg capsule Take 10 mg by mouth daily at bedtime    pantoprazole (PROTONIX) 40 mg tablet TAKE 1 TABLET (40 MG TOTAL) BY MOUTH 2 (TWO) TIMES A DAY AS NEEDED (GERD) (Patient  "taking differently: Take 40 mg by mouth 2 (two) times a day as needed (gerd) Patient states that she takes this every day)    zinc gluconate 50 mg tablet Take 50 mg by mouth daily    valACYclovir (VALTREX) 500 mg tablet TAKE 1 TABLET (500 MG TOTAL) BY MOUTH DAILY AS NEEDED FOR SHINGLES PREVENTION     Allergies   Allergen Reactions    Lyrica [Pregabalin] Itching and Rash     Reaction Date: [id number];     Buprenorphine Itching     If takes more than 3 days starts with itching    Diazepam Other (See Comments)     gets hyper    Hydrocodone Itching    Oxycodone-Acetaminophen Itching    Tramadol Itching    Nucynta [Tapentadol] Itching     Immunization History   Administered Date(s) Administered    COVID-19 MODERNA VACC 0.5 ML IM 01/07/2021, 02/04/2021, 11/15/2021    COVID-19 Moderna Vac BIVALENT 12 Yr+ IM 0.5 ML 10/14/2022    Influenza Split High Dose Preservative Free IM 10/30/2024    Influenza, recombinant, quadrivalent,injectable, preservative free 11/22/2019    Pneumococcal Conjugate Vaccine 20-valent (Pcv20), Polysace 04/19/2023     Objective   /80 (BP Location: Left arm, Patient Position: Sitting, Cuff Size: Large)   Pulse 95   Temp 98.6 °F (37 °C)   Ht 5' 4\" (1.626 m)   Wt 87.5 kg (193 lb)   SpO2 98%   BMI 33.13 kg/m²     Physical Exam  Constitutional:       Appearance: She is well-developed.   HENT:      Head: Normocephalic and atraumatic.      Right Ear: External ear normal.      Left Ear: External ear normal.      Nose: Nose normal.      Mouth/Throat:      Mouth: Mucous membranes are moist.   Eyes:      Conjunctiva/sclera: Conjunctivae normal.      Pupils: Pupils are equal, round, and reactive to light.   Cardiovascular:      Rate and Rhythm: Normal rate and regular rhythm.      Pulses: Normal pulses.      Heart sounds: Normal heart sounds.   Pulmonary:      Effort: Pulmonary effort is normal.      Breath sounds: Normal breath sounds.   Musculoskeletal:         General: Normal range of motion.      " Cervical back: Normal range of motion and neck supple.   Skin:     General: Skin is warm and dry.      Capillary Refill: Capillary refill takes less than 2 seconds.   Neurological:      General: No focal deficit present.      Mental Status: She is alert and oriented to person, place, and time. Mental status is at baseline.      Deep Tendon Reflexes: Reflexes are normal and symmetric.   Psychiatric:         Mood and Affect: Mood normal.         Behavior: Behavior normal.         Thought Content: Thought content normal.         Judgment: Judgment normal.       Administrative Statements   I have spent a total time of 35 minutes in caring for this patient on the day of the visit/encounter including Diagnostic results, Prognosis, Risks and benefits of tx options, Instructions for management, Patient and family education, Importance of tx compliance, Risk factor reductions, Impressions, Counseling / Coordination of care, Documenting in the medical record, Reviewing/placing orders in the medical record (including tests, medications, and/or procedures), and Obtaining or reviewing history  .

## 2025-05-10 DIAGNOSIS — E78.5 HYPERLIPIDEMIA, UNSPECIFIED HYPERLIPIDEMIA TYPE: ICD-10-CM

## 2025-05-11 RX ORDER — EZETIMIBE 10 MG/1
10 TABLET ORAL DAILY
Qty: 90 TABLET | Refills: 1 | Status: SHIPPED | OUTPATIENT
Start: 2025-05-11

## 2025-05-23 DIAGNOSIS — K21.9 GASTROESOPHAGEAL REFLUX DISEASE, UNSPECIFIED WHETHER ESOPHAGITIS PRESENT: ICD-10-CM

## 2025-05-23 NOTE — TELEPHONE ENCOUNTER
Reason for call:   [x] Refill   [] Prior Auth  [] Other:     Office:   [x] PCP/Provider - [facility]   [] Specialty/Provider -     Medication: pantoprazole (PROTONIX) 40 mg tablet     Dose/Frequency: 40 mg, 2 times daily PRN     Quantity: 180    Pharmacy: [id number]    Local Pharmacy   Does the patient have enough for 3 days?   [x] Yes   [] No - Send as HP to POD    Mail Away Pharmacy   Does the patient have enough for 10 days?   [] Yes   [] No - Send as HP to POD

## 2025-05-25 RX ORDER — PANTOPRAZOLE SODIUM 40 MG/1
40 TABLET, DELAYED RELEASE ORAL 2 TIMES DAILY PRN
Qty: 180 TABLET | Refills: 1 | Status: SHIPPED | OUTPATIENT
Start: 2025-05-25

## 2025-06-10 ENCOUNTER — APPOINTMENT (OUTPATIENT)
Dept: LAB | Facility: CLINIC | Age: 70
End: 2025-06-10
Payer: COMMERCIAL

## 2025-06-10 DIAGNOSIS — S20.362A TICK BITE OF LEFT FRONT WALL OF THORAX, INITIAL ENCOUNTER: ICD-10-CM
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
## 2025-06-10 PROCEDURE — 36415 COLL VENOUS BLD VENIPUNCTURE: CPT

## 2025-06-10 PROCEDURE — 86618 LYME DISEASE ANTIBODY: CPT

## 2025-06-11 ENCOUNTER — RESULTS FOLLOW-UP (OUTPATIENT)
Dept: FAMILY MEDICINE CLINIC | Facility: CLINIC | Age: 70
End: 2025-06-11

## 2025-06-11 LAB — B BURGDOR IGG+IGM SER QL IA: NEGATIVE

## 2025-06-12 NOTE — TELEPHONE ENCOUNTER
----- Message from Adolph Apodaca DO sent at 6/11/2025 10:06 PM EDT -----  These labs are normal, please notify patient of normal results. Lyme total antibodies negative  ----- Message -----  From: Lab, Background User  Sent: 6/11/2025   1:40 PM EDT  To: Adolph Apodaca DO

## 2025-06-13 DIAGNOSIS — B02.9 HERPES ZOSTER WITHOUT COMPLICATION: ICD-10-CM

## 2025-06-13 DIAGNOSIS — I10 PRIMARY HYPERTENSION: ICD-10-CM

## 2025-06-15 RX ORDER — LISINOPRIL AND HYDROCHLOROTHIAZIDE 10; 12.5 MG/1; MG/1
1 TABLET ORAL DAILY
Qty: 90 TABLET | Refills: 1 | Status: SHIPPED | OUTPATIENT
Start: 2025-06-15

## 2025-06-15 RX ORDER — VALACYCLOVIR HYDROCHLORIDE 500 MG/1
TABLET, FILM COATED ORAL
Qty: 90 TABLET | Refills: 1 | Status: SHIPPED | OUTPATIENT
Start: 2025-06-15 | End: 2025-09-13

## 2025-06-18 DIAGNOSIS — N95.2 ATROPHIC VAGINITIS: ICD-10-CM

## 2025-06-18 NOTE — TELEPHONE ENCOUNTER
Reason for call:   [x] Refill   [] Prior Auth  [x] Other: Patient was seeing Dr Omalley that left and states doesn't need an appointment until next year but will be out of medication.     Office:   [] PCP/Provider -   [x] Specialty/Provider - [facility]     Medication: estradiol (ESTRACE VAGINAL) 0.1 mg/g vaginal cream     Dose/Frequency: 1 g, 2 times weekly     Quantity: 42.5 g     Pharmacy: [id number]    Local Pharmacy   Does the patient have enough for 3 days?   [] Yes   [x] No - Send as HP to POD    Mail Away Pharmacy   Does the patient have enough for 10 days?   [] Yes   [] No - Send as HP to POD

## 2025-06-19 ENCOUNTER — TELEPHONE (OUTPATIENT)
Dept: FAMILY MEDICINE CLINIC | Facility: CLINIC | Age: 70
End: 2025-06-19

## 2025-06-19 DIAGNOSIS — N95.2 ATROPHIC VAGINITIS: ICD-10-CM

## 2025-06-19 RX ORDER — ESTRADIOL 0.1 MG/G
1 CREAM VAGINAL 2 TIMES WEEKLY
Qty: 42.5 G | Refills: 0 | OUTPATIENT
Start: 2025-06-19

## 2025-06-19 NOTE — TELEPHONE ENCOUNTER
Patient called the RX Refill Line. Message is being forwarded to the office.     Patient is requesting Dr. Apodaca to refill her estradiol cream previously prescribed by her obgyn. Pt had requested a refill to her obgyn office and it was refused due to Pt needing an ov for medication f/u. Per patient she only has enough med for 1 more day. Please review.      [id number] - [place] - [address]

## 2025-06-19 NOTE — TELEPHONE ENCOUNTER
Needs to be seen yearly to get renewal of vaginal estrogen. She does not need a yearly until next year, but if on medication needs a visit for renewal of meds

## 2025-06-19 NOTE — TELEPHONE ENCOUNTER
Called patient, informed her needs to make an appointment with GYN as they manage the medication. Dr. Apodaca does not see patient for diagnosis either. Left message.

## 2025-06-20 ENCOUNTER — TELEPHONE (OUTPATIENT)
Dept: OBGYN CLINIC | Facility: MEDICAL CENTER | Age: 70
End: 2025-06-20

## 2025-07-31 DIAGNOSIS — K21.9 GASTROESOPHAGEAL REFLUX DISEASE, UNSPECIFIED WHETHER ESOPHAGITIS PRESENT: ICD-10-CM

## 2025-07-31 RX ORDER — PANTOPRAZOLE SODIUM 40 MG/1
40 TABLET, DELAYED RELEASE ORAL 2 TIMES DAILY PRN
Qty: 180 TABLET | Refills: 1 | Status: SHIPPED | OUTPATIENT
Start: 2025-07-31

## (undated) DEVICE — NEEDLE HYPO 22G X 1-1/2 IN

## (undated) DEVICE — INTENT TO BE USED WITH SUTURE MATERIAL FOR TISSUE CLOSURE: Brand: RICHARD-ALLAN® NEEDLE 1/2 CIRCLE TAPER

## (undated) DEVICE — SUT VICRYL 0 CT-1 36 IN J946H

## (undated) DEVICE — ELECTROSURGICAL DEVICE HOLSTER;FOR USE WITH MAXIMUM PEAK VOLTAGE OF 4000 V: Brand: FORCE TRIVERSE

## (undated) DEVICE — INTENDED FOR TISSUE SEPARATION, AND OTHER PROCEDURES THAT REQUIRE A SHARP SURGICAL BLADE TO PUNCTURE OR CUT.: Brand: BARD-PARKER SAFETY BLADES SIZE 11, STERILE

## (undated) DEVICE — PREMIUM DRY TRAY LF: Brand: MEDLINE INDUSTRIES, INC.

## (undated) DEVICE — CATH FOLEY 18FR 5ML 2 WAY UNCOATED SILICONE

## (undated) DEVICE — GLOVE PI ULTRA TOUCH SZ.8.0

## (undated) DEVICE — SCD SEQUENTIAL COMPRESSION COMFORT SLEEVE MEDIUM KNEE LENGTH: Brand: KENDALL SCD

## (undated) DEVICE — TUBING SUCTION 5MM X 12 FT

## (undated) DEVICE — UNDER BUTTOCKS DRAPE W/FLUID CONTROL POUCH: Brand: CONVERTORS

## (undated) DEVICE — ALLENTOWN DR  LUCENTE S LAP PK: Brand: CARDINAL HEALTH

## (undated) DEVICE — 2000CC GUARDIAN II: Brand: GUARDIAN

## (undated) DEVICE — EXIDINE 4 PCT

## (undated) DEVICE — GLOVE INDICATOR UNDERGLOVE SZ 6 BLUE

## (undated) DEVICE — GLOVE PI ULTRA TOUCH SZ 6

## (undated) DEVICE — MEDI-VAC YANKAUER SUCTION HANDLE W/BULBOUS AND CONTROL VENT: Brand: CARDINAL HEALTH

## (undated) DEVICE — CAUTERY TIP POLISHER: Brand: DEVON

## (undated) DEVICE — GLOVE INDICATOR PI UNDERGLOVE SZ 6.5 BLUE

## (undated) DEVICE — BAG DECANTER

## (undated) DEVICE — SUT VICRYL 2-0 SH 27 IN UNDYED J417H